# Patient Record
Sex: FEMALE | Race: WHITE | NOT HISPANIC OR LATINO | Employment: OTHER | URBAN - METROPOLITAN AREA
[De-identification: names, ages, dates, MRNs, and addresses within clinical notes are randomized per-mention and may not be internally consistent; named-entity substitution may affect disease eponyms.]

---

## 2017-01-18 ENCOUNTER — APPOINTMENT (OUTPATIENT)
Dept: LAB | Facility: CLINIC | Age: 82
End: 2017-01-18
Payer: MEDICARE

## 2017-01-18 ENCOUNTER — ALLSCRIPTS OFFICE VISIT (OUTPATIENT)
Dept: OTHER | Facility: OTHER | Age: 82
End: 2017-01-18

## 2017-01-18 DIAGNOSIS — E03.9 HYPOTHYROIDISM: ICD-10-CM

## 2017-01-18 LAB — TSH SERPL DL<=0.05 MIU/L-ACNC: 2.13 UIU/ML (ref 0.36–3.74)

## 2017-01-18 PROCEDURE — 36415 COLL VENOUS BLD VENIPUNCTURE: CPT

## 2017-01-18 PROCEDURE — 84443 ASSAY THYROID STIM HORMONE: CPT

## 2017-02-08 ENCOUNTER — GENERIC CONVERSION - ENCOUNTER (OUTPATIENT)
Dept: OTHER | Facility: OTHER | Age: 82
End: 2017-02-08

## 2017-05-16 ENCOUNTER — GENERIC CONVERSION - ENCOUNTER (OUTPATIENT)
Dept: OTHER | Facility: OTHER | Age: 82
End: 2017-05-16

## 2017-08-03 ENCOUNTER — ALLSCRIPTS OFFICE VISIT (OUTPATIENT)
Dept: OTHER | Facility: OTHER | Age: 82
End: 2017-08-03

## 2017-09-27 ENCOUNTER — APPOINTMENT (OUTPATIENT)
Dept: LAB | Facility: CLINIC | Age: 82
End: 2017-09-27
Payer: MEDICARE

## 2017-09-27 ENCOUNTER — ALLSCRIPTS OFFICE VISIT (OUTPATIENT)
Dept: OTHER | Facility: OTHER | Age: 82
End: 2017-09-27

## 2017-09-27 ENCOUNTER — TRANSCRIBE ORDERS (OUTPATIENT)
Dept: LAB | Facility: CLINIC | Age: 82
End: 2017-09-27

## 2017-09-27 DIAGNOSIS — E03.9 HYPOTHYROIDISM: ICD-10-CM

## 2017-09-27 DIAGNOSIS — R42 DIZZINESS AND GIDDINESS: ICD-10-CM

## 2017-09-27 DIAGNOSIS — I10 ESSENTIAL (PRIMARY) HYPERTENSION: ICD-10-CM

## 2017-09-27 DIAGNOSIS — G47.00 INSOMNIA: ICD-10-CM

## 2017-09-27 DIAGNOSIS — R79.9 ABNORMAL FINDING OF BLOOD CHEMISTRY: ICD-10-CM

## 2017-09-27 DIAGNOSIS — E78.5 HYPERLIPIDEMIA: ICD-10-CM

## 2017-09-27 DIAGNOSIS — M25.50 PAIN IN JOINT: ICD-10-CM

## 2017-09-27 DIAGNOSIS — F41.9 ANXIETY DISORDER: ICD-10-CM

## 2017-09-27 DIAGNOSIS — R53.83 OTHER FATIGUE: ICD-10-CM

## 2017-09-27 LAB
ALBUMIN SERPL BCP-MCNC: 3.8 G/DL (ref 3.5–5)
ALP SERPL-CCNC: 71 U/L (ref 46–116)
ALT SERPL W P-5'-P-CCNC: 16 U/L (ref 12–78)
ANION GAP SERPL CALCULATED.3IONS-SCNC: 4 MMOL/L (ref 4–13)
AST SERPL W P-5'-P-CCNC: 18 U/L (ref 5–45)
BILIRUB SERPL-MCNC: 0.81 MG/DL (ref 0.2–1)
BUN SERPL-MCNC: 18 MG/DL (ref 5–25)
CALCIUM SERPL-MCNC: 9.7 MG/DL (ref 8.3–10.1)
CHLORIDE SERPL-SCNC: 106 MMOL/L (ref 100–108)
CHOLEST SERPL-MCNC: 175 MG/DL (ref 50–200)
CO2 SERPL-SCNC: 31 MMOL/L (ref 21–32)
CREAT SERPL-MCNC: 0.76 MG/DL (ref 0.6–1.3)
ERYTHROCYTE [DISTWIDTH] IN BLOOD BY AUTOMATED COUNT: 14 % (ref 11.6–15.1)
GFR SERPL CREATININE-BSD FRML MDRD: 67 ML/MIN/1.73SQ M
GLUCOSE P FAST SERPL-MCNC: 118 MG/DL (ref 65–99)
HCT VFR BLD AUTO: 39.7 % (ref 34.8–46.1)
HDLC SERPL-MCNC: 81 MG/DL (ref 40–60)
HGB BLD-MCNC: 13.1 G/DL (ref 11.5–15.4)
LDLC SERPL CALC-MCNC: 80 MG/DL (ref 0–100)
MCH RBC QN AUTO: 32 PG (ref 26.8–34.3)
MCHC RBC AUTO-ENTMCNC: 33 G/DL (ref 31.4–37.4)
MCV RBC AUTO: 97 FL (ref 82–98)
PLATELET # BLD AUTO: 245 THOUSANDS/UL (ref 149–390)
PMV BLD AUTO: 10.1 FL (ref 8.9–12.7)
POTASSIUM SERPL-SCNC: 4.3 MMOL/L (ref 3.5–5.3)
PROT SERPL-MCNC: 8.2 G/DL (ref 6.4–8.2)
RBC # BLD AUTO: 4.1 MILLION/UL (ref 3.81–5.12)
SODIUM SERPL-SCNC: 141 MMOL/L (ref 136–145)
TRIGL SERPL-MCNC: 69 MG/DL
TSH SERPL DL<=0.05 MIU/L-ACNC: 2.62 UIU/ML (ref 0.36–3.74)
WBC # BLD AUTO: 6.87 THOUSAND/UL (ref 4.31–10.16)

## 2017-09-27 PROCEDURE — 80061 LIPID PANEL: CPT

## 2017-09-27 PROCEDURE — 80053 COMPREHEN METABOLIC PANEL: CPT

## 2017-09-27 PROCEDURE — 84443 ASSAY THYROID STIM HORMONE: CPT

## 2017-09-27 PROCEDURE — 85027 COMPLETE CBC AUTOMATED: CPT

## 2017-09-27 PROCEDURE — 36415 COLL VENOUS BLD VENIPUNCTURE: CPT

## 2017-10-04 ENCOUNTER — GENERIC CONVERSION - ENCOUNTER (OUTPATIENT)
Dept: OTHER | Facility: OTHER | Age: 82
End: 2017-10-04

## 2017-11-14 ENCOUNTER — GENERIC CONVERSION - ENCOUNTER (OUTPATIENT)
Dept: OTHER | Facility: OTHER | Age: 82
End: 2017-11-14

## 2017-11-21 ENCOUNTER — ALLSCRIPTS OFFICE VISIT (OUTPATIENT)
Dept: OTHER | Facility: OTHER | Age: 82
End: 2017-11-21

## 2017-11-21 ENCOUNTER — HOSPITAL ENCOUNTER (INPATIENT)
Facility: HOSPITAL | Age: 82
LOS: 2 days | Discharge: HOME | DRG: 880 | End: 2017-11-24
Attending: EMERGENCY MEDICINE | Admitting: INTERNAL MEDICINE
Payer: MEDICARE

## 2017-11-21 DIAGNOSIS — R41.0 CONFUSION: Primary | ICD-10-CM

## 2017-11-21 DIAGNOSIS — R41.0 DISORIENTATION: ICD-10-CM

## 2017-11-21 DIAGNOSIS — R47.89 WORD FINDING DIFFICULTY: ICD-10-CM

## 2017-11-21 PROCEDURE — 85610 PROTHROMBIN TIME: CPT | Performed by: EMERGENCY MEDICINE

## 2017-11-21 PROCEDURE — 85730 THROMBOPLASTIN TIME PARTIAL: CPT | Performed by: EMERGENCY MEDICINE

## 2017-11-21 PROCEDURE — 36415 COLL VENOUS BLD VENIPUNCTURE: CPT | Performed by: EMERGENCY MEDICINE

## 2017-11-21 PROCEDURE — 81002 URINALYSIS NONAUTO W/O SCOPE: CPT | Performed by: EMERGENCY MEDICINE

## 2017-11-21 PROCEDURE — 80320 DRUG SCREEN QUANTALCOHOLS: CPT | Performed by: EMERGENCY MEDICINE

## 2017-11-21 PROCEDURE — 93005 ELECTROCARDIOGRAM TRACING: CPT | Performed by: EMERGENCY MEDICINE

## 2017-11-21 PROCEDURE — 96361 HYDRATE IV INFUSION ADD-ON: CPT

## 2017-11-21 PROCEDURE — 83735 ASSAY OF MAGNESIUM: CPT | Performed by: EMERGENCY MEDICINE

## 2017-11-21 PROCEDURE — 85025 COMPLETE CBC W/AUTO DIFF WBC: CPT | Performed by: EMERGENCY MEDICINE

## 2017-11-21 PROCEDURE — 80053 COMPREHEN METABOLIC PANEL: CPT | Performed by: EMERGENCY MEDICINE

## 2017-11-21 RX ADMIN — SODIUM CHLORIDE 1000 ML: 0.9 INJECTION, SOLUTION INTRAVENOUS at 23:54

## 2017-11-22 ENCOUNTER — APPOINTMENT (OUTPATIENT)
Dept: NON INVASIVE DIAGNOSTICS | Facility: HOSPITAL | Age: 82
DRG: 880 | End: 2017-11-22
Payer: MEDICARE

## 2017-11-22 ENCOUNTER — APPOINTMENT (OUTPATIENT)
Dept: MRI IMAGING | Facility: HOSPITAL | Age: 82
DRG: 880 | End: 2017-11-22
Payer: MEDICARE

## 2017-11-22 ENCOUNTER — APPOINTMENT (EMERGENCY)
Dept: CT IMAGING | Facility: HOSPITAL | Age: 82
DRG: 880 | End: 2017-11-22
Payer: MEDICARE

## 2017-11-22 PROBLEM — R41.0 CONFUSION: Status: ACTIVE | Noted: 2017-11-22

## 2017-11-22 PROBLEM — I10 ESSENTIAL HYPERTENSION: Chronic | Status: ACTIVE | Noted: 2017-11-22

## 2017-11-22 PROBLEM — R47.89 WORD FINDING DIFFICULTY: Status: ACTIVE | Noted: 2017-11-22

## 2017-11-22 PROBLEM — R41.3 MEMORY LOSS: Chronic | Status: ACTIVE | Noted: 2017-11-22

## 2017-11-22 PROBLEM — E78.5 HYPERLIPIDEMIA: Chronic | Status: ACTIVE | Noted: 2017-11-22

## 2017-11-22 PROBLEM — E03.9 HYPOTHYROID: Chronic | Status: ACTIVE | Noted: 2017-11-22

## 2017-11-22 PROBLEM — R41.82 ALTERED MENTAL STATUS: Status: ACTIVE | Noted: 2017-11-22

## 2017-11-22 LAB
ALBUMIN SERPL BCP-MCNC: 3.8 G/DL (ref 3.5–5)
ALP SERPL-CCNC: 69 U/L (ref 46–116)
ALT SERPL W P-5'-P-CCNC: 16 U/L (ref 12–78)
AMPHETAMINES SERPL QL SCN: NEGATIVE
ANION GAP SERPL CALCULATED.3IONS-SCNC: 11 MMOL/L (ref 4–13)
APTT PPP: 40 SECONDS (ref 23–35)
AST SERPL W P-5'-P-CCNC: 13 U/L (ref 5–45)
ATRIAL RATE: 79 BPM
BACTERIA UR QL AUTO: ABNORMAL /HPF
BARBITURATES UR QL: NEGATIVE
BASOPHILS # BLD AUTO: 0.03 THOUSANDS/ΜL (ref 0–0.1)
BASOPHILS NFR BLD AUTO: 0 % (ref 0–1)
BENZODIAZ UR QL: NEGATIVE
BILIRUB SERPL-MCNC: 0.85 MG/DL (ref 0.2–1)
BILIRUB UR QL STRIP: NEGATIVE
BUN SERPL-MCNC: 22 MG/DL (ref 5–25)
CALCIUM SERPL-MCNC: 9.6 MG/DL (ref 8.3–10.1)
CHLORIDE SERPL-SCNC: 102 MMOL/L (ref 100–108)
CHOLEST SERPL-MCNC: 166 MG/DL (ref 50–200)
CLARITY UR: CLEAR
CO2 SERPL-SCNC: 30 MMOL/L (ref 21–32)
COCAINE UR QL: NEGATIVE
COLOR UR: YELLOW
COLOR, POC: YELLOW
CREAT SERPL-MCNC: 0.83 MG/DL (ref 0.6–1.3)
EOSINOPHIL # BLD AUTO: 0.25 THOUSAND/ΜL (ref 0–0.61)
EOSINOPHIL NFR BLD AUTO: 3 % (ref 0–6)
ERYTHROCYTE [DISTWIDTH] IN BLOOD BY AUTOMATED COUNT: 13.6 % (ref 11.6–15.1)
EST. AVERAGE GLUCOSE BLD GHB EST-MCNC: 111 MG/DL
ETHANOL SERPL-MCNC: <3 MG/DL (ref 0–3)
GFR SERPL CREATININE-BSD FRML MDRD: 61 ML/MIN/1.73SQ M
GLUCOSE SERPL-MCNC: 115 MG/DL (ref 65–140)
GLUCOSE UR STRIP-MCNC: NEGATIVE MG/DL
HBA1C MFR BLD: 5.5 % (ref 4.2–6.3)
HCT VFR BLD AUTO: 39 % (ref 34.8–46.1)
HDLC SERPL-MCNC: 71 MG/DL (ref 40–60)
HGB BLD-MCNC: 12.9 G/DL (ref 11.5–15.4)
HGB UR QL STRIP.AUTO: ABNORMAL
INR PPP: 1.06 (ref 0.86–1.16)
KETONES UR STRIP-MCNC: NEGATIVE MG/DL
LDLC SERPL CALC-MCNC: 84 MG/DL (ref 0–100)
LEUKOCYTE ESTERASE UR QL STRIP: NEGATIVE
LYMPHOCYTES # BLD AUTO: 2.33 THOUSANDS/ΜL (ref 0.6–4.47)
LYMPHOCYTES NFR BLD AUTO: 32 % (ref 14–44)
MAGNESIUM SERPL-MCNC: 1.8 MG/DL (ref 1.6–2.6)
MCH RBC QN AUTO: 32.6 PG (ref 26.8–34.3)
MCHC RBC AUTO-ENTMCNC: 33.1 G/DL (ref 31.4–37.4)
MCV RBC AUTO: 99 FL (ref 82–98)
METHADONE UR QL: NEGATIVE
MONOCYTES # BLD AUTO: 0.53 THOUSAND/ΜL (ref 0.17–1.22)
MONOCYTES NFR BLD AUTO: 7 % (ref 4–12)
MUCOUS THREADS UR QL AUTO: ABNORMAL
NEUTROPHILS # BLD AUTO: 4.23 THOUSANDS/ΜL (ref 1.85–7.62)
NEUTS SEG NFR BLD AUTO: 58 % (ref 43–75)
NITRITE UR QL STRIP: NEGATIVE
NON-SQ EPI CELLS URNS QL MICRO: ABNORMAL /HPF
NRBC BLD AUTO-RTO: 0 /100 WBCS
OPIATES UR QL SCN: NEGATIVE
P AXIS: 83 DEGREES
PCP UR QL: NEGATIVE
PH UR STRIP.AUTO: 6 [PH] (ref 4.5–8)
PLATELET # BLD AUTO: 165 THOUSANDS/UL (ref 149–390)
PLATELET # BLD AUTO: 170 THOUSANDS/UL (ref 149–390)
PMV BLD AUTO: 9.6 FL (ref 8.9–12.7)
PMV BLD AUTO: 9.7 FL (ref 8.9–12.7)
POTASSIUM SERPL-SCNC: 3.6 MMOL/L (ref 3.5–5.3)
PR INTERVAL: 200 MS
PROT SERPL-MCNC: 7.8 G/DL (ref 6.4–8.2)
PROT UR STRIP-MCNC: NEGATIVE MG/DL
PROTHROMBIN TIME: 13.8 SECONDS (ref 12.1–14.4)
QRS AXIS: 22 DEGREES
QRSD INTERVAL: 88 MS
QT INTERVAL: 364 MS
QTC INTERVAL: 417 MS
RBC # BLD AUTO: 3.96 MILLION/UL (ref 3.81–5.12)
RBC #/AREA URNS AUTO: ABNORMAL /HPF
SODIUM SERPL-SCNC: 143 MMOL/L (ref 136–145)
SP GR UR STRIP.AUTO: 1.02 (ref 1–1.03)
SPECIMEN SOURCE: NORMAL
T WAVE AXIS: 73 DEGREES
THC UR QL: NEGATIVE
TRIGL SERPL-MCNC: 53 MG/DL
TROPONIN I BLD-MCNC: 0 NG/ML (ref 0–0.08)
UROBILINOGEN UR QL STRIP.AUTO: 0.2 E.U./DL
VENTRICULAR RATE: 79 BPM
WBC # BLD AUTO: 7.37 THOUSAND/UL (ref 4.31–10.16)
WBC #/AREA URNS AUTO: ABNORMAL /HPF

## 2017-11-22 PROCEDURE — 93306 TTE W/DOPPLER COMPLETE: CPT

## 2017-11-22 PROCEDURE — 84484 ASSAY OF TROPONIN QUANT: CPT

## 2017-11-22 PROCEDURE — 96374 THER/PROPH/DIAG INJ IV PUSH: CPT

## 2017-11-22 PROCEDURE — 85049 AUTOMATED PLATELET COUNT: CPT | Performed by: PHYSICIAN ASSISTANT

## 2017-11-22 PROCEDURE — 81001 URINALYSIS AUTO W/SCOPE: CPT

## 2017-11-22 PROCEDURE — 80307 DRUG TEST PRSMV CHEM ANLYZR: CPT | Performed by: EMERGENCY MEDICINE

## 2017-11-22 PROCEDURE — 87081 CULTURE SCREEN ONLY: CPT | Performed by: PHYSICIAN ASSISTANT

## 2017-11-22 PROCEDURE — 70450 CT HEAD/BRAIN W/O DYE: CPT

## 2017-11-22 PROCEDURE — 80061 LIPID PANEL: CPT | Performed by: PHYSICIAN ASSISTANT

## 2017-11-22 PROCEDURE — 83036 HEMOGLOBIN GLYCOSYLATED A1C: CPT | Performed by: PHYSICIAN ASSISTANT

## 2017-11-22 PROCEDURE — 99285 EMERGENCY DEPT VISIT HI MDM: CPT

## 2017-11-22 RX ORDER — ASPIRIN 81 MG/1
81 TABLET, CHEWABLE ORAL DAILY
Status: DISCONTINUED | OUTPATIENT
Start: 2017-11-22 | End: 2017-11-24 | Stop reason: HOSPADM

## 2017-11-22 RX ORDER — ATORVASTATIN CALCIUM 10 MG/1
10 TABLET, FILM COATED ORAL DAILY
COMMUNITY
End: 2017-11-24 | Stop reason: HOSPADM

## 2017-11-22 RX ORDER — ATORVASTATIN CALCIUM 10 MG/1
10 TABLET, FILM COATED ORAL
Status: DISCONTINUED | OUTPATIENT
Start: 2017-11-22 | End: 2017-11-22

## 2017-11-22 RX ORDER — LEVOTHYROXINE SODIUM 0.07 MG/1
75 TABLET ORAL
Status: DISCONTINUED | OUTPATIENT
Start: 2017-11-22 | End: 2017-11-24 | Stop reason: HOSPADM

## 2017-11-22 RX ORDER — ONDANSETRON 2 MG/ML
4 INJECTION INTRAMUSCULAR; INTRAVENOUS ONCE AS NEEDED
Status: COMPLETED | OUTPATIENT
Start: 2017-11-22 | End: 2017-11-22

## 2017-11-22 RX ORDER — ASPIRIN 81 MG/1
81 TABLET, CHEWABLE ORAL WEEKLY
Status: DISCONTINUED | OUTPATIENT
Start: 2017-11-22 | End: 2017-11-22

## 2017-11-22 RX ORDER — DONEPEZIL HYDROCHLORIDE 5 MG/1
10 TABLET, FILM COATED ORAL DAILY
Status: DISCONTINUED | OUTPATIENT
Start: 2017-11-22 | End: 2017-11-24 | Stop reason: HOSPADM

## 2017-11-22 RX ORDER — HEPARIN SODIUM 5000 [USP'U]/ML
5000 INJECTION, SOLUTION INTRAVENOUS; SUBCUTANEOUS EVERY 8 HOURS SCHEDULED
Status: DISCONTINUED | OUTPATIENT
Start: 2017-11-22 | End: 2017-11-24 | Stop reason: HOSPADM

## 2017-11-22 RX ORDER — ATORVASTATIN CALCIUM 40 MG/1
40 TABLET, FILM COATED ORAL EVERY EVENING
Status: DISCONTINUED | OUTPATIENT
Start: 2017-11-22 | End: 2017-11-24 | Stop reason: HOSPADM

## 2017-11-22 RX ORDER — ESCITALOPRAM OXALATE 5 MG/1
5 TABLET ORAL DAILY
Status: DISCONTINUED | OUTPATIENT
Start: 2017-11-22 | End: 2017-11-24 | Stop reason: HOSPADM

## 2017-11-22 RX ORDER — METOPROLOL TARTRATE 5 MG/5ML
5 INJECTION INTRAVENOUS ONCE
Status: COMPLETED | OUTPATIENT
Start: 2017-11-22 | End: 2017-11-22

## 2017-11-22 RX ORDER — HYDRALAZINE HYDROCHLORIDE 20 MG/ML
5 INJECTION INTRAMUSCULAR; INTRAVENOUS EVERY 6 HOURS PRN
Status: DISCONTINUED | OUTPATIENT
Start: 2017-11-22 | End: 2017-11-24 | Stop reason: HOSPADM

## 2017-11-22 RX ADMIN — ESCITALOPRAM 5 MG: 5 TABLET, FILM COATED ORAL at 17:28

## 2017-11-22 RX ADMIN — METOPROLOL TARTRATE 25 MG: 25 TABLET ORAL at 09:21

## 2017-11-22 RX ADMIN — METOPROLOL TARTRATE 5 MG: 5 INJECTION INTRAVENOUS at 00:41

## 2017-11-22 RX ADMIN — LEVOTHYROXINE SODIUM 75 MCG: 75 TABLET ORAL at 05:25

## 2017-11-22 RX ADMIN — ATORVASTATIN CALCIUM 40 MG: 40 TABLET, FILM COATED ORAL at 17:28

## 2017-11-22 RX ADMIN — ONDANSETRON 4 MG: 2 INJECTION INTRAMUSCULAR; INTRAVENOUS at 21:14

## 2017-11-22 RX ADMIN — DONEPEZIL HYDROCHLORIDE 10 MG: 5 TABLET, FILM COATED ORAL at 17:28

## 2017-11-22 RX ADMIN — ASPIRIN 81 MG 81 MG: 81 TABLET ORAL at 09:21

## 2017-11-22 RX ADMIN — HYDRALAZINE HYDROCHLORIDE 5 MG: 20 INJECTION INTRAMUSCULAR; INTRAVENOUS at 18:11

## 2017-11-22 RX ADMIN — HEPARIN SODIUM 5000 UNITS: 5000 INJECTION, SOLUTION INTRAVENOUS; SUBCUTANEOUS at 05:25

## 2017-11-22 RX ADMIN — METOPROLOL TARTRATE 25 MG: 25 TABLET ORAL at 18:08

## 2017-11-22 NOTE — H&P
History and Physical - [facility]    Patient Information: Bayron Day 80 y o  female MRN: [id number]  Unit/Bed#: E4 -01 Encounter: [id number]  Admitting Physician: Adair Shaikh PA-C  PCP: Marek Son DO  Date of Admission:  11/22/17      Assessment:    Altered mental status    Plan: Altered mental status  · Seen by PCP yesterday with increased confusion  · Today had altered mental status noted by the staff where she resides and was brought to the emergency department  · CT of the head revealed no active bleeding  · Will consult Neurology    Essential hypertension  · Continue metoprolol 25 mg b i d  · Add hydralazine 5 mg IV p r n  Hypothyroidism  · Levoxyl 75 mcg daily    Coronary artery disease  · Aspirin 81 mg daily    Memory loss  · Donepezil 5 mg q h s  HPI:   Isadora Brooks is a 80 y o  female who was seen by her primary care physician yesterday and was demonstrating increasing memory loss and confusion  The staff at her assisted living facility noted that this worsened today  On presentation to the emergency department she had difficulty word finding  She reported that her tongue felt thick or than normal   She was disoriented and confused  She was unable to do serial 7s as well as other cognitive assessments  CT of her head failed to show any acute bleeding or intracranial abnormality  Her urinalysis did not indicate a UTI  Her POC troponin was 0 00    Her CBC did not reveal and anemia  Denies: Chest pain, Shortness of Breath, Nausea, Vomiting, Diarrhea, Dysuria    ROS:  A 12-point review of systems was done  Please see the HPI for the full details  All other systems negative      PMH:  Principal Problem:    Altered mental status  Active Problems:    Word finding difficulty    Confusion    Essential hypertension    Memory loss    Hyperlipidemia    Hypothyroid      Past Medical History:   Diagnosis Date    Altered mental status 11/22/2017    Cardiac disease     Disease of thyroid gland     Essential hypertension 11/22/2017    Hyperlipidemia     Hypertension     Hypothyroid 11/22/2017    Memory loss 11/22/2017    MI (myocardial infarction)     Subdural bleeding (HCC)     from fall     Past Surgical History:   Procedure Laterality Date    BREAST LUMPECTOMY Right     CHOLECYSTECTOMY      HYSTERECTOMY       Social History     Social History    Marital status:      Spouse name: N/A    Number of children: N/A    Years of education: N/A     Social History Main Topics    Smoking status: Never Smoker    Smokeless tobacco: Never Used    Alcohol use No    Drug use: No    Sexual activity: Not Asked     Other Topics Concern    None     Social History Narrative    None     History reviewed  No pertinent family history      MED/ALLERGIES:  Current Facility-Administered Medications   Medication Dose Route Frequency Provider Last Rate Last Dose    aspirin chewable tablet 81 mg  81 mg Oral Daily Braxton Arriaga PA-C        aspirin tablet 81 mg  81 mg Oral Weekly [facility]        atorvastatin (LIPITOR) tablet 10 mg  10 mg Oral Daily Sharif Crawford PA-C        atorvastatin (LIPITOR) tablet 40 mg  40 mg Oral QPM Sharif Crawford PA-C        heparin (porcine) subcutaneous injection 5,000 Units  5,000 Units Subcutaneous [place], [place]        levothyroxine tablet 75 mcg  75 mcg Oral Daily [facility]        metoprolol tartrate (LOPRESSOR) tablet 25 mg  25 mg Oral BID [facility]        ondansetron [facility]) injection 4 mg  4 mg Intravenous Once PRN Delaney Camarillo DO         Allergies   Allergen Reactions    Haldol [Haloperidol]     Sulfa Antibiotics        OBJECTIVE:    Current Vitals:   Blood Pressure: (!) 196/92 (11/22/17 0221)  Pulse: 84 (11/22/17 0221)  Temperature: 98 7 °F (37 1 °C) (11/22/17 0221)  Temp Source: Temporal (11/22/17 0221)  Respirations: 18 (11/22/17 0221)  Height: 5' 3" (160 cm) (11/22/17 0221)  Weight - Scale: 57 3 kg (126 lb 5 2 oz) (11/22/17 0221)  SpO2: 97 % (11/22/17 0221)      Intake/Output Summary (Last 24 hours) at 11/22/17 0409  Last data filed at 11/22/17 0300   Gross per 24 hour   Intake             1000 ml   Output              800 ml   Net              200 ml       Invasive Devices     Peripheral Intravenous Line            Peripheral IV 11/21/17 Right Antecubital less than 1 day                  Physical Exam   Constitutional: She appears well-developed and well-nourished  No distress  Eyes: EOM are normal  Pupils are equal, round, and reactive to light  No scleral icterus  Neck: No thyromegaly present  Cardiovascular: Normal rate, regular rhythm and normal heart sounds  No murmur heard  Pulmonary/Chest: Effort normal and breath sounds normal  No respiratory distress  She has no wheezes  Abdominal: Soft  Bowel sounds are normal  She exhibits no distension  There is no tenderness  There is no rebound  Neurological: She is alert  She is disoriented  No cranial nerve deficit  Psychiatric: She has a normal mood and affect  Her speech is normal and behavior is normal  Thought content is not paranoid  Cognition and memory are impaired  She does not express impulsivity  She expresses no homicidal and no suicidal ideation  She exhibits abnormal recent memory                                                       Lab Results:   Results from last 7 days  Lab Units 11/21/17  2355   WBC Thousand/uL 7 37   HEMOGLOBIN g/dL 12 9   HEMATOCRIT % 39 0   PLATELETS Thousands/uL 165      Results from last 7 days  Lab Units 11/21/17  2355   SODIUM mmol/L 143   POTASSIUM mmol/L 3 6   CHLORIDE mmol/L 102   CO2 mmol/L 30   BUN mg/dL 22   CREATININE mg/dL 0 83   CALCIUM mg/dL 9 6   TOTAL PROTEIN g/dL 7 8   BILIRUBIN TOTAL mg/dL 0 85   ALK PHOS U/L 69   ALT U/L 16   AST U/L 13   GLUCOSE RANDOM mg/dL 115     Lab Results   Component Value Date    CKTOTAL 53 09/09/2015    TROPONINI 0 41 (H) 06/01/2015 Imaging:  CT head:  No ischemic event; age-related decrease in white matter  EKG, Pathology, and Other Studies:  EKG:  No ischemic findings    VTE Prophylaxis: Heparin    Code Status: FULL    Counseling / Coordination of Care: Total floor / unit time spent today 20 minutes  Anticipated Length of Stay will be: MORE THAN 2 (TWO) Midnights due to: work up for altered mental status  Andrés Hernández PA-C    This note has been constructed using a voice recognition system

## 2017-11-22 NOTE — CONSULTS
Consulted for stroke  Pt reported good appetite, no concerns w/ chewing or swallowing, no concerns w/ wt changes  Eats well at [facility], avoids, salt use and sweets/desserts  Pt reported usually eating oatmeal, flaxseed, banana for breakfast, salad bar and soup for L and meat/fish, veggies for D, stated "I always pass on the desserts and sweets"  Pt not appropriate for any diet ed  Recommend liberalizing diet to Regular

## 2017-11-22 NOTE — PLAN OF CARE
Activity Intolerance/Impaired Mobility     Mobility/activity is maintained at optimum level for patient Progressing        Communication Impairment     Ability to express needs and understand communication Progressing        INFECTION - ADULT     Absence or prevention of progression during hospitalization Progressing     Absence of fever/infection during neutropenic period Progressing        Knowledge Deficit     Patient/family/caregiver demonstrates understanding of disease process, treatment plan, medications, and discharge instructions Progressing        Neurological Deficit     Neurological status is stable or improving Progressing        NEUROSENSORY - ADULT     Achieves stable or improved neurological status Progressing        PAIN - ADULT     Verbalizes/displays adequate comfort level or baseline comfort level Progressing        Potential for Aspiration     Non-ventilated patient's risk of aspiration is minimized Progressing        Potential for Falls     Patient will remain free of falls Progressing        Prexisting or High Potential for Compromised Skin Integrity     Skin integrity is maintained or improved Progressing        SAFETY ADULT     Maintain or return to baseline ADL function Progressing     Maintain or return mobility status to optimal level Progressing

## 2017-11-22 NOTE — ED PROVIDER NOTES
History  Chief Complaint   Patient presents with    Medical Problem     pt woke up from nap and felt "off", she stated she felt like she was confused and that her tongue was swollen, called her LPN who came over and found her daily medications scattered all over the counter      79 yo female who lives alone at [facility] in [place] [address]  Pt took a nap this evening and when she woke up she felt strange - clock said 7:21 but pt felt the numbers were not correct  Having a hard time telling me why she felt this - was able to see the numbers and focus on them but says they just were not right  She continued having similar issues and called nursing staff around 2300 who noted some scattered pills on the counter  Pt denies taking any extra medications, only those she was scheduled for  She is neuro intact here although has trouble counting backwards from 100 by 7's and spelling world correctly or backwards  History provided by:  Patient and EMS personnel   used: No    Altered Mental Status   Presenting symptoms: confusion    Severity:  Mild  Most recent episode: Today  Episode history:  Single  Duration:  5 hours  Timing:  Constant  Progression:  Unchanged  Chronicity:  New  Associated symptoms: no abdominal pain, no fever, no hallucinations, no headaches, no light-headedness, no nausea, no palpitations, no rash, no suicidal behavior, no vomiting and no weakness    Associated symptoms comment:  Felt her tongue feels thick      Prior to Admission Medications   Prescriptions Last Dose Informant Patient Reported? Taking?   aspirin 81 MG tablet   Yes Yes   Sig: Take 81 mg by mouth once a week  3 times a week   atorvastatin (LIPITOR) 10 mg tablet   Yes Yes   Sig: Take 10 mg by mouth daily   levothyroxine 75 mcg tablet   Yes Yes   Sig: Take 75 mcg by mouth daily  metoprolol tartrate (LOPRESSOR) 25 mg tablet   Yes Yes   Sig: Take 25 mg by mouth 2 (two) times a day  Facility-Administered Medications: None       Past Medical History:   Diagnosis Date    Cardiac disease     Disease of thyroid gland     Hyperlipidemia     Hypertension     MI (myocardial infarction)     Subdural bleeding ([address] )     from fall       Past Surgical History:   Procedure Laterality Date    BREAST LUMPECTOMY Right     CHOLECYSTECTOMY      HYSTERECTOMY         History reviewed  No pertinent family history  I have reviewed and agree with the history as documented  Social History   Substance Use Topics    Smoking status: Never Smoker    Smokeless tobacco: Not on file    Alcohol use No        Review of Systems   Constitutional: Negative for appetite change, chills, fatigue and fever  HENT: Negative for congestion and sore throat  Eyes: Negative for visual disturbance  Respiratory: Negative for shortness of breath  Cardiovascular: Negative for chest pain and palpitations  Gastrointestinal: Negative for abdominal pain, diarrhea, nausea and vomiting  Genitourinary: Negative for dysuria, frequency, vaginal bleeding and vaginal discharge  Musculoskeletal: Negative for back pain, neck pain and neck stiffness  Skin: Negative for pallor and rash  Allergic/Immunologic: Negative for immunocompromised state  Neurological: Negative for weakness, light-headedness and headaches  Psychiatric/Behavioral: Positive for confusion  Negative for hallucinations  All other systems reviewed and are negative        Physical Exam  ED Triage Vitals   Temperature Pulse Respirations Blood Pressure SpO2   11/22/17 0141 11/21/17 2349 11/21/17 2349 11/21/17 2349 11/21/17 2349   97 7 °F (36 5 °C) 82 18 (!) 215/92 98 %      Temp Source Heart Rate Source Patient Position - Orthostatic VS BP Location FiO2 (%)   11/22/17 0141 11/21/17 2349 11/22/17 0036 11/21/17 2349 --   Oral Monitor Lying Right arm       Pain Score       11/21/17 2349       No Pain           Orthostatic Vital Signs  Vitals:    11/22/17 0036 11/22/17 0052 11/22/17 0100 11/22/17 0204   BP: (!) 191/80 (!) 191/80 (!) 184/79 151/70   Pulse: 74 66 62    Patient Position - Orthostatic VS: Lying          Physical Exam   Constitutional: She is oriented to person, place, and time  She appears well-developed and well-nourished  No distress  HENT:   Head: Normocephalic and atraumatic  Right Ear: External ear normal    Left Ear: External ear normal    Mouth/Throat: Oropharynx is clear and moist    Eyes: EOM are normal  Pupils are equal, round, and reactive to light  Neck: Normal range of motion  Neck supple  Cardiovascular: Normal rate and regular rhythm  No murmur heard  Pulmonary/Chest: Effort normal and breath sounds normal  No respiratory distress  Abdominal: Soft  Bowel sounds are normal    Musculoskeletal: Normal range of motion  Neurological: She is alert and oriented to person, place, and time  She displays normal reflexes  No cranial nerve deficit or sensory deficit  She exhibits normal muscle tone  Coordination normal    Difficulty counting backwards from 100 by 7's, trouble spelling world forward and backwards, some word finding issues during H&P - unable to come up with the word denture when describing why she is refusing to smile for me while testing her cranial nerves   Skin: Skin is warm  Capillary refill takes less than 2 seconds  No rash noted  No pallor  Psychiatric: She has a normal mood and affect  Her behavior is normal    Nursing note and vitals reviewed        ED Medications  Medications   sodium chloride 0 9 % bolus 1,000 mL (0 mL Intravenous Stopped 11/22/17 0124)   metoprolol (LOPRESSOR) injection 5 mg (5 mg Intravenous Given 11/22/17 0041)       Diagnostic Studies  Results Reviewed     Procedure Component Value Units Date/Time    Rapid drug screen, urine [[id number]]  (Normal) Collected:  11/22/17 0134    Lab Status:  Final result Specimen:  Urine from Urine, Clean Catch Updated:  11/22/17 0156     Amph/Meth UR Negative     Barbiturate Ur Negative     Benzodiazepine Urine Negative     Cocaine Urine Negative     Methadone Urine Negative     Opiate Urine Negative     PCP Ur Negative     THC Urine Negative    Narrative:         FOR MEDICAL PURPOSES ONLY  IF CONFIRMATION NEEDED PLEASE CONTACT THE LAB WITHIN 5 DAYS      Drug Screen Cutoff Levels:  AMPHETAMINE/METHAMPHETAMINES  1000 ng/mL  BARBITURATES     200 ng/mL  BENZODIAZEPINES     200 ng/mL  COCAINE      300 ng/mL  METHADONE      300 ng/mL  OPIATES      300 ng/mL  PHENCYCLIDINE     25 ng/mL  THC       50 ng/mL    Urine Microscopic [[id number]]  (Abnormal) Collected:  11/22/17 0148    Lab Status:  Final result Specimen:  Urine from Urine, Clean Catch Updated:  11/22/17 0147     RBC, UA 0-1 (A) /hpf      WBC, UA 0-1 (A) /hpf      Epithelial Cells Occasional /hpf      Bacteria, UA Occasional /hpf      MUCOUS THREADS Occasional    POCT urinalysis dipstick [[id number]]  (Normal) Resulted:  11/22/17 0135    Lab Status:  Final result Specimen:  Urine Updated:  11/22/17 0135     Color, UA yellow    ED Urine Macroscopic [[id number]]  (Abnormal) Collected:  11/22/17 0148    Lab Status:  Final result Specimen:  Urine Updated:  11/22/17 0132     Color, UA Yellow     Clarity, UA Clear     pH, UA 6 0     Leukocytes, UA Negative     Nitrite, UA Negative     Protein, UA Negative mg/dl      Glucose, UA Negative mg/dl      Ketones, UA Negative mg/dl      Urobilinogen, UA 0 2 E U /dl      Bilirubin, UA Negative     Blood, UA Trace (A)     Specific [place], UA 1 020    Narrative:       CLINITEK RESULT    Comprehensive metabolic panel [[id number]] Collected:  11/21/17 9074    Lab Status:  Final result Specimen:  Blood from Arm, Right Updated:  11/22/17 0058     Sodium 143 mmol/L      Potassium 3 6 mmol/L      Chloride 102 mmol/L      CO2 30 mmol/L      Anion Gap 11 mmol/L      BUN 22 mg/dL      Creatinine 0 83 mg/dL      Glucose 115 mg/dL      Calcium 9 6 mg/dL      AST 13 U/L      ALT 16 U/L Alkaline Phosphatase 69 U/L      Total Protein 7 8 g/dL      Albumin 3 8 g/dL      Total Bilirubin 0 85 mg/dL      eGFR 61 ml/min/1 73sq m     Narrative:         National Kidney Disease Education Program recommendations are as follows:  GFR calculation is accurate only with a steady state creatinine  Chronic Kidney disease less than 60 ml/min/1 73 sq  meters  Kidney failure less than 15 ml/min/1 73 sq  meters  Magnesium [[id number]]  (Normal) Collected:  11/21/17 2355    Lab Status:  Final result Specimen:  Blood from Arm, Right Updated:  11/22/17 0058     Magnesium 1 8 mg/dL     Protime-INR [[id number]]  (Normal) Collected:  11/21/17 2355    Lab Status:  Final result Specimen:  Blood from Arm, Right Updated:  11/22/17 0044     Protime 13 8 seconds      INR 1 06    APTT [[id number]]  (Abnormal) Collected:  11/21/17 2355    Lab Status:  Final result Specimen:  Blood from Arm, Right Updated:  11/22/17 0044     PTT 40 (H) seconds     Narrative: Therapeutic Heparin Range = 60-90 seconds    Ethanol [[id number]]  (Normal) Collected:  11/21/17 2355    Lab Status:  Final result Specimen:  Blood from Arm, Right Updated:  11/22/17 0033     Ethanol Lvl <3 mg/dL     POCT troponin [[id number]]  (Normal) Collected:  11/22/17 0000    Lab Status:  Final result Updated:  11/22/17 0013     POC Troponin I 0 00 ng/ml      Specimen Type VENOUS    Narrative:         [employer] i-Stat handheld analyzer 99% cutoff is > 0 08ng/mL in [facility] Emergency Departments    o cTnI 99% cutoff is useful only when applied to patients in the clinical setting of myocardial ischemia  o cTnI 99% cutoff should be interpreted in the context of clinical history, ECG findings and possibly cardiac imaging to establish correct diagnosis  o cTnI 99% cutoff may be suggestive but clearly not indicative of a coronary event without the clinical setting of myocardial ischemia      CBC and differential [[id number]]  (Abnormal) Collected:  11/21/17 2355    Lab Status:  Final result Specimen:  Blood from Arm, Right Updated:  11/22/17 0002     WBC 7 37 Thousand/uL      RBC 3 96 Million/uL      Hemoglobin 12 9 g/dL      Hematocrit 39 0 %      MCV 99 (H) fL      MCH 32 6 pg      MCHC 33 1 g/dL      RDW 13 6 %      MPV 9 7 fL      Platelets 824 Thousands/uL      nRBC 0 /100 WBCs      Neutrophils Relative 58 %      Lymphocytes Relative 32 %      Monocytes Relative 7 %      Eosinophils Relative 3 %      Basophils Relative 0 %      Neutrophils Absolute 4 23 Thousands/µL      Lymphocytes Absolute 2 33 Thousands/µL      Monocytes Absolute 0 53 Thousand/µL      Eosinophils Absolute 0 25 Thousand/µL      Basophils Absolute 0 03 Thousands/µL                  CT head without contrast    (Results Pending)              Procedures  ECG 12 Lead Documentation  Date/Time: 11/22/2017 12:05 AM  Performed by: Radha Benz by: Saeid Herrera     Indications / Diagnosis:  Confusion  Previous ECG:     Previous ECG:  Compared to current    Comparison ECG info:  5/3/14    Similarity:  No change  Interpretation:     Interpretation: normal    Rate:     ECG rate:  79    ECG rate assessment: normal    Rhythm:     Rhythm: sinus rhythm    Ectopy:     Ectopy: none    QRS:     QRS axis:  Normal    QRS intervals:  Normal  ST segments:     ST segments:  Normal  T waves:     T waves: normal             Phone Contacts  ED Phone Contact    ED Course  ED Course as of Nov 22 0212   Tue Nov 21, 2017   2341 Pt seen and examined  81 yo female who lives alone at [facility] in [facility]  Pt took a nap this evening and when she woke up she felt strange - clock said 7:21 but pt felt the numbers were not correct  Having a hard time telling me why she felt this - was able to see the numbers and focus on them but says they just were not right  She continued having similar issues and called nursing staff around 2300 who noted some scattered pills on the counter    Pt denies taking any extra medications, only those she was scheduled for  She is neuro intact here although has trouble counting backwards from 100 by 7's and spelling world correctly or backwards  Will check labs, urine, EKG and CT head  Will give IVF  BS enroute WNL  Wed Nov 22, 2017   0029 Initial /92, will repeat and if still elevated give 5mg lopressor as she takes metoprolol at home and HR in 80's  0036 191/80, HR 80 - pt to be given lopressor 5mg  0036 CT head - 1  No intracranial hemorrhage  2  Age related cerebral volume loss and findings likely related to chronic white matter ischemic  disease  0102 Labs all WNL, pt attempting to give urine sample  9837 Urine dip neg for infection  /82  Pt seems slightly more confused than before  I mention her being admitted for further eval and she says "that means moving and I don't want to move because it will effect my vertigo" yet pt not vertiginous at this time  MDM  CritCare Time    Disposition  Final diagnoses:   Confusion   Word finding difficulty     Time reflects when diagnosis was documented in both MDM as applicable and the Disposition within this note     Time User Action Codes Description Comment    11/22/2017  1:49 AM Keira HERNÁNDEZ Add [R41 0] Confusion     11/22/2017  1:50 AM Lila Cloud Add [R47 89] Word finding difficulty       ED Disposition     ED Disposition Condition Comment    Admit  Case was discussed with Stefani MOODY and the patient's admission status was agreed to be Admission Status: observation status to the service of Dr Pierre Loyd   Follow-up Information    None       Patient's Medications   Discharge Prescriptions    No medications on file     No discharge procedures on file      ED Provider  Electronically Signed by           Nilsa Cruz DO  11/22/17 9217

## 2017-11-22 NOTE — PROGRESS NOTES
Assessment    1  Benign essential hypertension (401 1) (I10)   2  Memory loss (780 93) (R41 3)    Discussion/Summary    1  Blood pressure stable today  2  Cognitive issues/early memory loss  Continue present medication  Patient does seem a little bit more forgetful today than previously noted  Will follow this closely  Recheck in April with full labs  Declines going back to Cardiology  The was counseled regarding instructions for management,-- patient and family education  Chief Complaint  F/U HTN and HL; discuss asa  ksd,cma      History of Present Illness  The patient presents for follow-up of essential hypertension  The patient states she has been doing well with her blood pressure control since the last visit  Symptoms: The patient is currently asymptomatic  60-year-old white female here for blood pressure check and review of medications before the winter starts  Overall she states she is doing well  Lives at [address] [facility]   Constitutional: No fever, no chills, feels well, no tiredness, no recent weight gain or weight loss  Cardiovascular: No complaints of slow heart rate, no fast heart rate, no chest pain, no palpitations, no leg claudication, no lower extremity edema  Respiratory: No complaints of shortness of breath, no wheezing, no cough, no SOB on exertion, no orthopnea, no PND  Active Problems  1  Actinic keratosis (702 0) (L57 0)   2  Ambulatory dysfunction (719 7) (R26 2)   3  Anxiety (300 00) (F41 9)   4  Arthralgia (719 40) (M25 50)   5  Benign essential hypertension (401 1) (I10)   6  Constipation (564 00) (K59 00)   7  Coronary artery disease (414 00) (I25 10)   8  De Quervain's tenosynovitis (727 04) (M65 4)   9  Dry skin (701 1) (L85 3)   10  Hand pain, unspecified laterality   11  Hyperlipidemia (272 4) (E78 5)   12  Hypothyroidism (244 9) (E03 9)   13  Insomnia (780 52) (G47 00)   14  Medicare annual wellness visit, subsequent (V70 0) (Z00 00)   15  Memory loss (780 93) (R41 3)   16  Need for immunization against influenza (V04 81) (Z23)   17  Need for influenza vaccination (V04 81) (Z23)   18  Need for pneumococcal vaccination (V03 82) (Z23)   19  Need for Tdap vaccination (V06 1) (Z23)   20  Screening for colon cancer (V76 51) (Z12 11)   21  Screening for depression (V79 0) (Z13 89)   22  Screening for other and unspecified genitourinary condition (V81 6) (Z13 89)   23  Special screening for other neurological conditions (V80 09) (Z13 89)   24  Subdural hygroma (432 1) (D18 1)   25  Synovitis And Tenosynovitis Of Hand/Wrist (727 05)   26  Transient ischemic attack (435 9) (G45 9)   27  Unstable angina (411 1) (I20 0)   28  Vitamin D deficiency (268 9) (E55 9)   29  Xerostomia (527 7) (R68 2)    Past Medical History    1  History of Abnormal blood chemistry (790 6) (R79 9)   2  History of Abnormal CT scan, neck (793 99) (R93 8)   3  History of Acute myocardial infarction (410 90) (I21 9)   4  History of Acute URI (465 9) (J06 9)   5  History of Anxiety (300 00) (F41 9)   6  History of Benign essential hypertension (401 1) (I10)   7  History of Benign paroxysmal vertigo, unspecified laterality (386 11) (H81 10)   8  History of Depression (311) (F32 9)   9  History of Depression (311) (F32 9)   10  History of Diverticulosis (562 10) (K57 90)   11  History of basal cell carcinoma (V10 83) (Z85 828)   12  History of dizziness (V13 89) (Z87 898)   13  History of fatigue (V13 89) (Z87 898)   14  History of shortness of breath (V13 89) (Z87 898)   15  History of shortness of breath (V13 89) (Z87 898)   16  History of subdural hematoma (V12 59) (Z86 79)   17  History of transient cerebral ischemia (V12 54) (Z86 73)   18  History of vertigo (V12 49) (Z87 898)   19  History of Limb pain (729 5) (M00 352)   20  History of Long term current use of antithrombotics/antiplatelets (M86 13) (Z42 70)   21   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)   22  History of Need for influenza vaccination (V04 81) (Z23)   23  History of Special screening for other neurological conditions (V80 09) (Z13 89)    Surgical History  1  History of Colonoscopy (Fiberoptic)   2  History of Total Abdominal Hysterectomy With Removal Of Both Ovaries    Family History  Mother    1  Family history of Diabetes Mellitus (V18 0)  Father    2  Family history of Colon Cancer (V16 0)  Maternal Grandmother    3  Family history of   Paternal Grandmother    3  Family history of   Maternal Grandfather    5  Family history of   Paternal Grandfather    10  Family history of     Social History     · Being A Social Drinker   · Living In [facility]   · Marital History -    · Never A Smoker    Current Meds   1  Ammonium Lactate 12 % External Cream; APPLY  AND RUB  IN A THIN FILM TO AFFECTED AREAS TWICE DAILY  (AM AND PM); Therapy: 83DTP5177 to (Last Rx:2017)  Requested for: 12LNQ7441 Ordered   2  Aspirin 81 MG TABS; take one tab 3 times/week  MW; Therapy: (Recorded:2015) to Recorded   3  Atorvastatin Calcium 10 MG Oral Tablet; TAKE 1 TABLET DAILY; Therapy: 17YYH2405 to (Evaluate:19Lyw9744)  Requested for: 08Kpm6319; Last Rx:57Knz6510 Ordered   4  Donepezil HCl - 5 MG Oral Tablet; TAKE ONE TABLET BY MOUTH AT BEDTIME; Therapy: 52APR5353 to (Evaluate:20Xtf7179)  Requested for: 83ZZE7124; Last Rx:85Upn3367 Ordered   5  Levoxyl 75 MCG Oral Tablet; TAKE 1 TABLET DAILY; Therapy: 39Btv5420 to (Evaluate:14Cms7940)  Requested for: 93Wjw4673; Last Rx:03Rtl6822 Ordered   6  Metoprolol Tartrate 25 MG Oral Tablet; take 1 tablet by mouth twice a day; Therapy: 91MBW0550 to (Evaluate:94Mbe9734)  Requested for: 03Ucy5245; Last Rx:88Njn2412 Ordered    Allergies  1  CeleBREX CAPS   2  ACE Inhibitors   3  Haldol SOLN   4   Sulfa Drugs    Vitals  Vital Signs    Recorded: 10UVY1673 95:67AP   Systolic 278   Diastolic 70   Height 5 ft 1 2 in   Weight 127 lb    BMI Calculated 23 84   BSA Calculated 1 56       Physical Exam   Constitutional  General appearance: No acute distress, well appearing and well nourished  Pulmonary  Auscultation of lungs: Clear to auscultation  Cardiovascular  Auscultation of heart: Normal rate and rhythm, normal S1 and S2, without murmurs     Examination of extremities for edema and/or varicosities: Normal    Carotid pulses: Normal    Psychiatric  Mood and affect: Normal          Future Appointments    Date/Time Provider Specialty Site   99/35/4663 98:46 PM Landon Donahue DO Family Medicine [facility]       Signatures   Electronically signed by : Darnell Mansfield DO; Nov 21 5744  9:56AM EST                       (Author)

## 2017-11-22 NOTE — CASE MANAGEMENT
Initial Clinical Review    Admission: Date/Time/Statement:  OBS 11/22 @ 0150    Orders Placed This Encounter   Procedures    Place in Observation (expected length of stay for this patient is less than two midnights)     Standing Status:   Standing     Number of Occurrences:   1     Order Specific Question:   Admitting Physician     Answer:   DEONNA VILLATLA [[id number]]     Order Specific Question:   Level of Care     Answer:   Med Surg [16]       ED: Date/Time/Mode of Arrival:   ED Arrival Information     Expected Arrival Acuity Means of Arrival Escorted By Service Admission Type    - 11/21/2017 23:40 Urgent Ambulance [facility] Urgent    Arrival Complaint    od          Chief Complaint:   Chief Complaint   Patient presents with    Medical Problem     pt woke up from nap and felt "off", she stated she felt like she was confused and that her tongue was swollen, called her LPN who came over and found her daily medications scattered all over the counter        History of Illness: 81 yo female who [facility] in [facility]  Pt took a nap this evening and when she woke up she felt strange - clock said 7:21 but pt felt the numbers were not correct  Having a hard time telling me why she felt this - was able to see the numbers and focus on them but says they just were not right  She continued having similar issues and called nursing staff around 2300 who noted some scattered pills on the counter  Pt denies taking any extra medications, only those she was scheduled for    She is neuro intact here although has trouble counting backwards from 100 by 7's and spelling world correctly or backwards    ED Vital Signs:   ED Triage Vitals   Temperature Pulse Respirations Blood Pressure SpO2   11/22/17 0141 11/21/17 2349 11/21/17 2349 11/21/17 2349 11/21/17 2349   97 7 °F (36 5 °C) 82 18 (!) 215/92 98 %      Temp Source Heart Rate Source Patient Position - Orthostatic VS BP Location FiO2 (%)   11/22/17 0141 11/21/17 2349 11/22/17 0036 11/21/17 2349 --   Oral Monitor Lying Right arm       Pain Score       11/21/17 2349       No Pain        Wt Readings from Last 1 Encounters:   11/22/17 57 3 kg (126 lb 5 2 oz)       Vital Signs (abnormal):   11/22/17 0100 -- 62 18  184/79 92 % -- SHARONDA   11/22/17 0052 -- 66 18  191/80 97 % -- SHARONDA   11/22/17 0036 -- 74 18  191/80 97 % -- DIC   11/21/17 2349 -- 82 18         Abnormal Labs/Diagnostic Test Results:  PTT  40  Urine: trace blood,   occ bacteria  CT Head: No acute intracranial abnormality  EKG: Normal sinus rhythm  Normal ECG    ED Treatment:   Medication Administration from 11/21/2017 2340 to 11/22/2017 0215       Date/Time Order Dose Route Action Action by Comments     11/22/2017 0124 sodium chloride 0 9 % bolus 1,000 mL 0 mL Intravenous Stopped Fletcher Gama RN      11/21/2017 2354 sodium chloride 0 9 % bolus 1,000 mL 1,000 mL Intravenous New [facility] Fletcher Gama, [address] [place] [address]      11/22/2017 0041 metoprolol (LOPRESSOR) injection 5 mg 5 mg Intravenous Given Fletcher Gama RN           Past Medical/Surgical History: Active Ambulatory Problems     Diagnosis Date Noted    No Active Ambulatory Problems     Resolved Ambulatory Problems     Diagnosis Date Noted    No Resolved Ambulatory Problems     Past Medical History:   Diagnosis Date    Altered mental status 11/22/2017    Cardiac disease     Disease of thyroid gland     Essential hypertension 11/22/2017    Hyperlipidemia     Hypertension     Hypothyroid 11/22/2017    Memory loss 11/22/2017    MI (myocardial infarction)     Subdural bleeding (HCC)        Admitting Diagnosis: Confusion [R41 0]  Overdose [T50 901A]  Word finding difficulty [R47 89]    Age/Sex: 80 y o  female     80 y o  female who was seen by her primary care physician yesterday and was demonstrating increasing memory loss and confusion  The staff at her assisted living facility noted that this worsened today  On presentation to the emergency department she had difficulty word finding  She reported that her tongue felt thick or than normal   She was disoriented and confused  She was unable to do serial 7s as well as other cognitive assessments  CT of her head failed to show any acute bleeding or intracranial abnormality  Assessment/Plan:   Altered mental status     Plan:     Altered mental status  · Seen by PCP yesterday with increased confusion  · Today had altered mental status noted by the staff where she resides and was brought to the emergency department  · CT of the head revealed no active bleeding  · Will consult Neurology     Essential hypertension  · Continue metoprolol 25 mg b i d  · Add hydralazine 5 mg IV p r n      Hypothyroidism  · Levoxyl 75 mcg daily     Coronary artery disease  · Aspirin 81 mg daily     Memory loss  · Donepezil 5 mg q h s  Anticipated Length of Stay will be: MORE THAN 2 (TWO) Midnights due to: work up for altered mental status      Admission Orders:  OBS  TELE  Neuro checks q1h x 4, q2h x 4, q4h  Consult Neuro  PT / OT / Speech Eval  Dysphagia assessment  MRI  ECHO    Scheduled Meds:   aspirin 81 mg Oral Weekly   aspirin 81 mg Oral Daily   atorvastatin 10 mg Oral Daily With Dinner   atorvastatin 40 mg Oral QPM   heparin (porcine) 5,000 Units Subcutaneous Q8H [address]   levothyroxine 75 mcg Oral Early Morning   metoprolol tartrate 25 mg Oral BID     Continuous Infusions:    PRN Meds: hydrALAZINE    ondansetron    T Max  99 6 temporal  /78      St  [address] [facility] in the [facility] by Levy Gonzalez for 2017  Network Utilization Review Department  Phone: [phone]; Fax [phone]  ATTENTION: The Network Utilization Review Department is now centralized for our 7 Facilities  Make a note that we have a new phone and fax numbers for our Department   Please call with any questions or concerns to [phone] and carefully follow the prompts so that you are directed to the right person  All voicemails are confidential  Fax any determinations, approvals, denials, and requests for initial or continue stay review clinical to [phone]  Due to HIGH CALL volume, it would be easier if you could please send faxed requests to expedite your requests and in part, help us provide discharge notifications faster

## 2017-11-22 NOTE — OCCUPATIONAL THERAPY NOTE
Occupational Therapy Cancellation Note        Patient Name: Jenifer Robbins  QBVPB'C Date: 11/22/2017    Attempted to see pt x2, pt was being evaluated by neurology and pt off floor at MRI  Will continue to follow to address OT evaluation      Bernie Wood MS, OTR/L

## 2017-11-22 NOTE — CONSULTS
Consultation - Neurology   Robert Marley 80 y o  female MRN: [id number]  Unit/Bed#: E4 -01 Encounter: [id number]      Assessment/Plan   Assessment:  1  Episode of impaired visual processing, resolved  Differential diagnosis includes:  1  Acute occipital stroke or TIA;  2  Hypertensive encephalopathy/PRES affecting the occipital lobes (left less likely);  3   Severe anxiety/panic resulting in worsened deducted reasoning, judgment, global cognitive function, and specific deterioration of visual processing, an area which she exhibited deficits on prior testing  (Most likely)  2  Reported increased insomnia for several days due to increased frequency of anxiety episodes  3  Dementia, may be frontal lobe or fronto temporal  4  Anxiety, depression  5  History of subdural hematoma/hygroma due to a mechanical fall while on daily aspirin  Plan:  1  Continue aspirin 81 mg daily, rather than 3 times weekly  API can be checked in 5-10 days  2   Patient refused MRI  3  Continue Lipitor at 40 mg daily  4  Prior testing of reversible dementia labs was unremarkable   5  Aricept can be increased to 10 mg daily  6  Consider use of an antidepressant with anxiolytic properties  7  Recommend she transfer to a higher level of care within her facility, likely assisted level  She requires medications supervision, reassurance by having staff available  8  Additional recommendations per attending neurologist  9    Discontinue stroke pathway  History of Present Illness     Physician Requesting Consult: Ramy Ann MD  Reason for Consult / Principal Problem: confusion, disorientation  Hx and PE limited by:  Her reliability as a historian is unclear    HPI: Robert Marley is a 80y o  year old right-handed female with a history of dementia who was admitted on the stroke pathway to [facility] in [facility] after she presented to the emergency department on 11/212017 at 2340 hours with confusion and disorientation  Neurology has been asked to evaluate  This patient has a history of cognitive decline dating back to 2006  She was followed in the [facility] neurology office until about 2012, and had been prescribed Aricept as well as Namenda  It is unclear when these medications were discontinued  While she was under our care, she underwent neuropsychological testing (Estefania Paris PhD at [facility]), where, in addition to impaired memory, she showed deficits in visual scanning and attention, capacity to recall visual stimuli, encoding visual stimuli, and visual attention to details  In September 2017, her PCP restarted Aricept 5 mg daily, with plans to increase to 10 mg  She was seen in the PCP office on 11/21/2017 in the morning, at which time she was noted to be more forgetful compared to previous visits  She voiced no complaints, and overall her physical status was stable  Blood pressure was 120/70  She reports to this examiner that she has had worsening episodes of anxiety for at least 1 month  She admits to excessive worry, especially about appointments, schedule, her losing things  She notes that she escalates into a panic stage or crisis, and cannot sleep  She reports having poor sleep for at least 3 days prior to this admission  To other examiners, she reported she took a nap in the late day 11/21, however she has no recall of the nap for me  Which she has consistently reported however is that she looked at the clock, and was able to make out each individual number as well as the a m /p m  dot, but she could not interpret what the numbers meant  She recalls thinking that she urgently needed to sleep, but her feelings were of anxiety, confusion  After several hours, she summoned the facility nurse at [facility], and had no difficulty recalling the office phone number from memory, or identifying/using numbers on her phone  An ambulance was summoned for transport to the ED  Yg Lord Pills were noted to be scattered on the kitchen counter, and the patient recalls that those were aspirin tablets that she had pushed around  She denied taking extra medications, but admits that she has difficulty remembering to take her medications on schedule (uses a medication box)  She arrived in the ED, where she was afebrile, heart rate 82, but blood pressure 215/92  Blood pressure only came down to 191/80 with time, then she received Lopressor 5 mg IV  Blood pressure remained elevated until after 0200  She reports she mentally improved while in the ED, but is unsure what time  She had 1 episode of panic in the ED:  While she was being transferred to a [facility], she anticipated she would have vertigo, which was quite distressing to her in the past   She has otherwise remained stable since arrival     She denies headache, neck pain, acute visual changes, lateralizing weakness, focal sensory loss  She endorses shortness of breath and palpitations when anxious  Episodes of anxiety are increasing in frequency  She denies chest pain, abdominal complaints  Her aspirin dose has been increased from 81 mg 3 times a week to 81 mg daily, Lipitor dose was increased from 10 mg to 40 mg daily  She is awaiting MRI brain  She offers that she is having increasing difficulty with medication compliance, at times missing doses  She queries if this is the time that she should increase the amount of supervision and care she received the [facility], by moving to the assisted living section  Inpatient consult to Neurology  Consult performed by: Meghan Santana ordered by: Sp Morris          Review of Systems   Constitutional: Positive for fatigue  HENT: Positive for dental problem and hearing loss  Negative for ear pain and trouble swallowing  Eyes: Positive for visual disturbance  Respiratory: Positive for shortness of breath  Cardiovascular: Positive for palpitations  Negative for chest pain  Musculoskeletal: Positive for arthralgias  Neurological: Negative for dizziness, tremors, seizures, syncope, speech difficulty, weakness, light-headedness, numbness and headaches  Psychiatric/Behavioral: Positive for confusion, decreased concentration, dysphoric mood and sleep disturbance  The patient is nervous/anxious  All other systems reviewed and are negative  Historical Information   Past Medical History:   Diagnosis Date    Altered mental status 11/22/2017    Anxiety     Basal cell adenocarcinoma     Coronary artery disease     Dementia 2006    Depression     Diverticulosis     Essential hypertension 11/22/2017    Hyperlipidemia     Hypertension     Hypothyroid 11/22/2017    Hypothyroid     Insomnia     MI (myocardial infarction)     Subdural hematoma, post-traumatic ([facility] [address] ) 2014    left sided, treated nonoperatively    Subdural hygroma 2014    s/p subdural hematoma    TIA (transient ischemic attack)     Unstable angina (HCC) 2015    Vertigo      Past Surgical History:   Procedure Laterality Date    BREAST LUMPECTOMY Right     CATARACT EXTRACTION      CHOLECYSTECTOMY      HYSTERECTOMY       Social History   History   Alcohol Use No     History   Drug Use No     History   Smoking Status    Never Smoker   Smokeless Tobacco    Never Used     Family History:   Family History   Problem Relation Age of Onset    Diabetes Mother     Lung cancer Mother     Colon cancer Father     Dementia Father     Prostate cancer Brother        Review of previous medical records was  completed       Meds/Allergies   current meds:   Current Facility-Administered Medications   Medication Dose Route Frequency    aspirin chewable tablet 81 mg  81 mg Oral Weekly    aspirin chewable tablet 81 mg  81 mg Oral Daily    atorvastatin (LIPITOR) tablet 10 mg  10 mg Oral Daily With Dinner    atorvastatin (LIPITOR) tablet 40 mg  40 mg Oral QPM    heparin (porcine) subcutaneous injection 5,000 Units 5,000 Units Subcutaneous Q8H [address]    hydrALAZINE (APRESOLINE) injection 5 mg  5 mg Intravenous Q6H PRN    levothyroxine tablet 75 mcg  75 mcg Oral Early Morning    metoprolol tartrate (LOPRESSOR) tablet 25 mg  25 mg Oral BID    ondansetron (ZOFRAN) injection 4 mg  4 mg Intravenous Once PRN    and PTA meds:   Prior to Admission Medications   Prescriptions Last Dose Informant Patient Reported? Taking?   aspirin 81 MG tablet   Yes Yes   Sig: Take 81 mg by mouth once a week  3 times a week   atorvastatin (LIPITOR) 10 mg tablet   Yes Yes   Sig: Take 10 mg by mouth daily   levothyroxine 75 mcg tablet   Yes Yes   Sig: Take 75 mcg by mouth daily  metoprolol tartrate (LOPRESSOR) 25 mg tablet   Yes Yes   Sig: Take 25 mg by mouth 2 (two) times a day  Facility-Administered Medications: None   Aricept 5mg daily (restarted 09/2017)    Allergies   Allergen Reactions    Haldol [Haloperidol]     Sulfa Antibiotics        Objective   Vitals:Blood pressure 141/78, pulse 72, temperature 98 5 °F (36 9 °C), temperature source Temporal, resp  rate 18, height 5' 3" (1 6 m), weight 57 3 kg (126 lb 5 2 oz), SpO2 96 %  ,Body mass index is 22 38 kg/m²  Intake/Output Summary (Last 24 hours) at 11/22/17 1008  Last data filed at 11/22/17 0643   Gross per 24 hour   Intake             1000 ml   Output             1000 ml   Net                0 ml       Physical Exam   Constitutional: She appears well-developed and well-nourished  No distress  HENT:   Head: Normocephalic and atraumatic  Mouth/Throat: Oropharynx is clear and moist    Eyes: Conjunctivae and EOM are normal  Pupils are equal, round, and reactive to light  No scleral icterus  Postop IOL   Neck: Neck supple  Carotid bruit is not present  No neck rigidity  Decreased range of motion: All planes  Cardiovascular: Normal rate, regular rhythm and intact distal pulses  No murmur heard  Pulmonary/Chest: Effort normal and breath sounds normal    Abdominal: Soft   Bowel sounds are normal    Musculoskeletal: Normal range of motion  She exhibits no edema or deformity  Sitting posture:  Listing to the left, head lean to left   Neurological: She has normal strength  She has a normal Finger-Nose-Finger Test and a normal Heel to [employer]    Reflex Scores:       Tricep reflexes are 1+ on the right side and 1+ on the left side  Bicep reflexes are 1+ on the right side and 1+ on the left side  Brachioradialis reflexes are 1+ on the right side and 1+ on the left side  Patellar reflexes are 1+ on the right side and 1+ on the left side  Achilles reflexes are 2+ on the right side and 2+ on the left side  Skin: Skin is dry  She is not diaphoretic  Hands and feet cool to touch   Psychiatric: Her speech is normal    Mild anxiety   Vitals reviewed  Neurologic Exam     Mental Status   Oriented to person  Oriented to place  Disoriented to date and day  Oriented to year and month  Follows 2 step (Unable to complete 3 step motor commands) commands  Attention: decreased (Errors with recitation of reverse order months, but able to problem solve)  Concentration: decreased  Speech: speech is normal   Level of consciousness: alert  Knowledge: good  Able to perform simple calculations (Errors when performing verbal calculations:  Intact written calculations with inattention to plus or minus signs)  Unable to name object (Intermittent naming errors of common objects)  Able to read (No errors)  Able to repeat  Normal comprehension  Able deduce the correct date when given additional information  Suspect she has confabulatory at times  An example is the educational and occupational history given to this examiner is entirely different from that supplied to the neuro psychologist      Cranial Nerves     CN II   Visual fields full to confrontation     Visual acuity: normal with correction  Right visual field deficit: none  Left visual field deficit: none     CN III, IV, VI   Pupils are equal, round, and reactive to light  Extraocular motions are normal    Right pupil: Size: 2 mm  Shape: irregular  Reactivity: sluggish  Left pupil: Size: 2 mm  Shape: irregular  Reactivity: sluggish  Nystagmus: none   Conjugate gaze: present    CN V   Facial sensation intact  Right facial sensation deficit: none  Left facial sensation deficit: none    CN VII   Facial expression full, symmetric  Left facial weakness: Subtle flattening of nasolabial fold at rest only, may be normal variant  CN VIII   Hearing: intact (To finger rub)    CN IX, X   CN IX normal    CN X normal    Palate: symmetric    CN XI   CN XI normal    Right sternocleidomastoid strength: normal  Left sternocleidomastoid strength: normal  Right trapezius strength: normal  Left trapezius strength: normal    CN XII   CN XII normal    Tongue deviation: none  Unable to visualize fundi     Motor Exam   Muscle bulk: normal (For age)  Overall muscle tone: normal  Right arm pronator drift: absent  Left arm pronator drift: absent    Strength   Strength 5/5 throughout       Sensory Exam   Light touch normal    Right arm vibration: normal  Left arm vibration: normal  Right leg vibration: decreased from knee  Left leg vibration: decreased from ankle  Right arm pinprick: normal  Left arm pinprick: normal  Right leg pinprick: decreased from ankle  Left leg pinprick: decreased from ankle  Temperature decreased in hands and feet     Gait, Coordination, and Reflexes     Gait  Gait: (Deferred)    Coordination   Finger to nose coordination: normal  Heel to shin coordination: normal    Tremor   Resting tremor: absent  Intention tremor: absent  Action tremor: absent    Reflexes   Right brachioradialis: 1+  Left brachioradialis: 1+  Right biceps: 1+  Left biceps: 1+  Right triceps: 1+  Left triceps: 1+  Right patellar: 1+  Left patellar: 1+  Right achilles: 2+  Left achilles: 2+  Right plantar: normal  Left plantar: normal  Right Thakkar: absent  Left Thakkar: absent  Right ankle clonus: absent  Left ankle clonus: absentFine motor repetitions symmetric/brisk:  Finger taps, toe taps           Lab Results:   CBC:   Results from last 7 days  Lab Units 11/22/17  0530 11/21/17  2355   WBC Thousand/uL  --  7 37   RBC Million/uL  --  3 96   HEMOGLOBIN g/dL  --  12 9   HEMATOCRIT %  --  39 0   MCV fL  --  99*   PLATELETS Thousands/uL 170 165   , BMP/CMP:   Results from last 7 days  Lab Units 11/21/17  2355   SODIUM mmol/L 143   POTASSIUM mmol/L 3 6   CHLORIDE mmol/L 102   CO2 mmol/L 30   ANION GAP mmol/L 11   BUN mg/dL 22   CREATININE mg/dL 0 83   GLUCOSE RANDOM mg/dL 115   CALCIUM mg/dL 9 6   AST U/L 13   ALT U/L 16   ALK PHOS U/L 69   TOTAL PROTEIN g/dL 7 8   ALBUMIN g/dL 3 8   BILIRUBIN TOTAL mg/dL 0 85   EGFR ml/min/1 73sq m 61   , Coagulation:   Results from last 7 days  Lab Units 11/21/17  2355   INR  1 06   , Urinalysis:   Results from last 7 days  Lab Units 11/22/17  0148 11/22/17  0135   COLOR UA  Yellow yellow   CLARITY UA  Clear  --    SPEC GRAV UA  1 020  --    PH UA  6 0  --    LEUKOCYTES UA  Negative  --    NITRITE UA  Negative  --    PROTEIN UA mg/dl Negative  --    GLUCOSE UA mg/dl Negative  --    KETONES UA mg/dl Negative  --    BILIRUBIN UA  Negative  --    BLOOD UA  Trace*  --    , Drug Screen:   Results from last 7 days  Lab Units 11/22/17  0134   BARBITURATE UR  Negative   BENZODIAZEPINE UR  Negative   THC UR  Negative   COCAINE UR  Negative   METHADONE URINE  Negative   OPIATE UR  Negative   PCP UR  Negative    No results found for: HGBA1C  Results from last 7 days  Lab Units 11/22/17  0530   TRIGLYCERIDES mg/dL 53   CHOLESTEROL mg/dL 166   LDL CALC mg/dL 84   HDL mg/dL 71*   BAT neg  Trop 0 00    09/2017 TSH 2 620  09/2016 B12 778  01/2015 CRP 2 5, ESR 41, DANIEL + titer 160, RF neg, SS-A/SS-B neg    Imaging Studies: I have personally reviewed pertinent reports     and I have personally reviewed pertinent films in PACS   CT head: Decreased attenuation is noted in the supratentorial white matter demonstrating an appearance most consistent with moderate microangiopathic change  No intracranial mass, mass effect or midline shift  No CT signs of acute infarction  There is no parenchymal hemorrhage  Enlargement of ventricles and extra-axial CSF spaces consistent with cerebral and cerebellar atrophy  MRI brain: pending    10/2014 CT head: Periventricular low attenuation likely represents chronic small vessel ischemic disease  No intracranial mass, mass effect or midline shift  No acute intracranial hemorrhage  No CT signs of acute infarction  Vascular calcifications  Prominence of the cortical sulci and ventricles is consistent with atrophy  Left hemispheric chronic subdural hematoma/hygroma appears more hypodense and less perceptible, measuring roughly 2-3 mm, previously measuring 4 mm    05/2014 CT head: Decreased attenuation is noted in the supratentorial white matter demonstrating an appearance most consistent with moderate microangiopathic change  There is a left frontal acute sub-dural hematoma measuring 16 mm in maximal thickness  There appears to be slight rightward midline shift measuring 2 mm  Trace amounts of subdural blood also seen along the left temporal and parietal lobes  These measure 4 mm and 3 mm respectively  No CT signs of acute infarction  EKG, Pathology, and Other Studies: I have personally reviewed pertinent reports  EKG: Normal sinus rhythm Normal ECG When compared with ECG of 03-MAY-2014 18:26, Premature atrial complexes are no longer Present  Atrial/ventric rate 79    Echocardiogram: PND    TRANSTHORACIC ECHOCARDIOGRAM  01-Jun-2015   SUMMARY   LEFT VENTRICLE:  Systolic function was normal  Ejection fraction was estimated in the range of   55 % to 65 %  There were no regional wall motion abnormalities  Wall thickness was mildly increased  There was mild concentric hypertrophy     MITRAL VALVE: There was trace regurgitation  AORTIC VALVE: There was trace regurgitation  TRICUSPID VALVE: There was trace regurgitation  PULMONIC VALVE: There was trace regurgitation  AORTA: Luminal irregularities of the upper abdominal aorta        VTE Prophylaxis: Heparin

## 2017-11-22 NOTE — PLAN OF CARE
INFECTION - ADULT     Absence or prevention of progression during hospitalization Progressing     Absence of fever/infection during neutropenic period Progressing        PAIN - ADULT     Verbalizes/displays adequate comfort level or baseline comfort level Progressing        Potential for Falls     Patient will remain free of falls Progressing        SAFETY ADULT     Maintain or return to baseline ADL function Progressing     Maintain or return mobility status to optimal level Progressing

## 2017-11-22 NOTE — SPEECH THERAPY NOTE
Screen only  "i talk too much, and I love the salad bar at [facility]"  No acute speech or swallow needs at this time  "i would like my short term memory back"  D/c ST order

## 2017-11-22 NOTE — PHYSICAL THERAPY NOTE
Physical Therapy Cancellation Note- pt currently being evaluated by neurology, will follow for ability to complete eval   Rusty Goodson PT

## 2017-11-22 NOTE — PLAN OF CARE
INFECTION - ADULT     Absence or prevention of progression during hospitalization Progressing     Absence of fever/infection during neutropenic period Progressing        NEUROSENSORY - ADULT     Achieves stable or improved neurological status Progressing        PAIN - ADULT     Verbalizes/displays adequate comfort level or baseline comfort level Progressing        Potential for Falls     Patient will remain free of falls Progressing        Prexisting or High Potential for Compromised Skin Integrity     Skin integrity is maintained or improved Progressing        SAFETY ADULT     Maintain or return to baseline ADL function Progressing     Maintain or return mobility status to optimal level Progressing

## 2017-11-23 PROBLEM — F41.1 GENERALIZED ANXIETY DISORDER: Status: ACTIVE | Noted: 2017-11-23

## 2017-11-23 LAB — MRSA NOSE QL CULT: NORMAL

## 2017-11-23 PROCEDURE — 97166 OT EVAL MOD COMPLEX 45 MIN: CPT

## 2017-11-23 PROCEDURE — G8988 SELF CARE GOAL STATUS: HCPCS

## 2017-11-23 PROCEDURE — G8979 MOBILITY GOAL STATUS: HCPCS

## 2017-11-23 PROCEDURE — G8989 SELF CARE D/C STATUS: HCPCS

## 2017-11-23 PROCEDURE — G8987 SELF CARE CURRENT STATUS: HCPCS

## 2017-11-23 PROCEDURE — 97163 PT EVAL HIGH COMPLEX 45 MIN: CPT

## 2017-11-23 PROCEDURE — G8978 MOBILITY CURRENT STATUS: HCPCS

## 2017-11-23 RX ADMIN — DONEPEZIL HYDROCHLORIDE 10 MG: 5 TABLET, FILM COATED ORAL at 08:30

## 2017-11-23 RX ADMIN — ASPIRIN 81 MG 81 MG: 81 TABLET ORAL at 08:30

## 2017-11-23 RX ADMIN — METOPROLOL TARTRATE 25 MG: 25 TABLET ORAL at 08:30

## 2017-11-23 RX ADMIN — ESCITALOPRAM 5 MG: 5 TABLET, FILM COATED ORAL at 08:30

## 2017-11-23 RX ADMIN — METOPROLOL TARTRATE 25 MG: 25 TABLET ORAL at 17:49

## 2017-11-23 RX ADMIN — HEPARIN SODIUM 5000 UNITS: 5000 INJECTION, SOLUTION INTRAVENOUS; SUBCUTANEOUS at 05:35

## 2017-11-23 RX ADMIN — LEVOTHYROXINE SODIUM 75 MCG: 75 TABLET ORAL at 08:29

## 2017-11-23 RX ADMIN — ATORVASTATIN CALCIUM 40 MG: 40 TABLET, FILM COATED ORAL at 17:49

## 2017-11-23 NOTE — PLAN OF CARE
Problem: PHYSICAL THERAPY ADULT  Goal: Performs mobility at highest level of function for planned discharge setting  See evaluation for individualized goals  Treatment/Interventions: Functional transfer training, LE strengthening/ROM, Therapeutic exercise, Endurance training, Patient/family training, Bed mobility, Gait training, Spoke to nursing, OT  Equipment Recommended: Filiberto George       See flowsheet documentation for full assessment, interventions and recommendations  Outcome: Progressing  Prognosis: Good  Problem List: Decreased cognition, Impaired judgement, Decreased safety awareness, Impaired balance  Assessment: Pt  89 y  o female admitted for altered mental status in the setting of dementia  (-) stroke per CT  Pt referred to PT for mobility assessment & D/C planning  PTA, pt resident of [address] [place] [address]; pt reports being I w/ RW but admits to noncompliance w/ use of RW at times  On eval, pt appears to be close to her normal baseline function  Pt require S for most functional mobility + cues for techniques  Gait deviations as above but no gross LOB noted  Will continue skilled PT to improve function & safety  At this time, pt will benefit from a more supervised environment such as [facility]  Per RN, pt is in the process of transitioning to [facility] PTA but not finalized yet  If pt unable to transfer to [facility] yet, pt may D/C to her apartment w/ 24hr(S) for safety  CM to follow  Pt remain OOB in chair at end of session w/o issues  Call bell in reach  Chair alarm in placed & activated  Nsg staff to continue to mobilized pt (ambulate in unit & OOB in chair for all meals) as tolerated to prevent further decline in function  Nsg notified     Barriers to Discharge: Decreased caregiver support  Barriers to Discharge Comments: pt home alone  Recommendation: Other (Comment) (pt will benefit from a more supervised environment)     PT - OK to Discharge: Yes (when medically cleared)    See flowsheet documentation for full assessment

## 2017-11-23 NOTE — PHYSICIAN ADVISOR
Current patient class: Observation  The patient is currently on Hospital Day: 2    This patient was originally admitted to the hospital under observation status  After admission the patient was reevaluated and determined to require further hospitalization  The patient is now documented to require at least a 2nd midnight in the hospital  As such the patient is now expected to satisfy the 2 midnight benchmark and is therefore eligible for inpatient admission  After review of the relevant documentation, labs, vital signs and test results, the patient is appropriate for INPATIENT ADMISSION  Admission to the hospital as an inpatient is a complex decision making process which requires the practitioner to consider the patients presenting complaint, history and physical examination and all relevant testing  With this in mind, in this case, the patient was deemed appropriate for INPATIENT ADMISSION  After review of the documentation and testing available at the time of the admission I concur with this clinical determination of medical necessity  Rationale is as follows: The patient is a 80 yrs Female who presented to the ED at 11/21/2017 11:40 PM with a chief complaint of Medical Problem (pt woke up from nap and felt "off", she stated she felt like she was confused and that her tongue was swollen, called her LPN who came over and found her daily medications scattered all over the counter )     Patient will continue to remain hospitalized further, with evaluation by Neurology for possible occipital stroke versus TIA, hypertensive encephalopathy, and severe anxiety  Patient will satisfy the 2 midnight benchmark, and will continue to remain hospitalized further  The patient will satisfy the benchmark tonight, and is therefore appropriate for inpatient admission    Given the patient's advanced age, presence of neurological symptoms, multiple possible etiologies and requiring further evaluation the patient is at significantly increased risk of adverse outcome upon discharge  As such the patient is appropriate for inpatient admission      The patients vitals on arrival were ED Triage Vitals   Temperature Pulse Respirations Blood Pressure SpO2   11/22/17 0141 11/21/17 2349 11/21/17 2349 11/21/17 2349 11/21/17 2349   97 7 °F (36 5 °C) 82 18 (!) 215/92 98 %      Temp Source Heart Rate Source Patient Position - Orthostatic VS BP Location FiO2 (%)   11/22/17 0141 11/21/17 2349 11/22/17 0036 11/21/17 2349 --   Oral Monitor Lying Right arm       Pain Score       11/21/17 2349       No Pain           Past Medical History:   Diagnosis Date    Altered mental status 11/22/2017    Anxiety     Basal cell adenocarcinoma     Coronary artery disease     Dementia 2006    Depression     Diverticulosis     Essential hypertension 11/22/2017    Hyperlipidemia     Hypertension     Hypothyroid 11/22/2017    Hypothyroid     Insomnia     MI (myocardial infarction)     Subdural hematoma, post-traumatic ([facility] [address] ) 2014    left sided, treated nonoperatively    Subdural hygroma 2014    s/p subdural hematoma    TIA (transient ischemic attack)     Unstable angina ([facility] [address] ) 2015    Vertigo      Past Surgical History:   Procedure Laterality Date    BREAST LUMPECTOMY Right     CATARACT EXTRACTION      CHOLECYSTECTOMY      HYSTERECTOMY         The patient was admitted to the hospital at N/A on N/A for the following diagnosis:  Confusion [R41 0]  Overdose [T50 901A]  Word finding difficulty [R47 89]    Consults have been placed to:   IP CONSULT TO NEUROLOGY  IP CONSULT TO NUTRITION SERVICES    Vitals:    11/22/17 1808 11/22/17 1811 11/22/17 1900 11/22/17 2113   BP: (!) 173/79 (!) 173/79 146/66 150/65   Pulse: 79  72 55   Resp:   20 20   Temp:   (!) 96 7 °F (35 9 °C) (!) 96 8 °F (36 °C)   TempSrc:   Tympanic Tympanic   SpO2:   96% 96%   Weight:       Height:           Most recent labs:    Recent Labs      11/21/17   2355  11/22/17   0530   WBC 7 37   --    HGB  12 9   --    HCT  39 0   --    PLT  165  170   K  3 6   --    NA  143   --    CALCIUM  9 6   --    BUN  22   --    CREATININE  0 83   --    INR  1 06   --    AST  13   --    ALT  16   --    ALKPHOS  69   --    BILITOT  0 85   --        Scheduled Meds:  aspirin 81 mg Oral Daily   atorvastatin 40 mg Oral QPM   donepezil 10 mg Oral Daily   escitalopram 5 mg Oral Daily   heparin (porcine) 5,000 Units Subcutaneous Q8H [address]   levothyroxine 75 mcg Oral Early Morning   metoprolol tartrate 25 mg Oral BID     Continuous Infusions:   PRN Meds: hydrALAZINE    Surgical procedures (if appropriate):

## 2017-11-23 NOTE — OCCUPATIONAL THERAPY NOTE
OccupationalTherapy Evaluation(time=5576-8419)     Patient Name: Ronald Castaneda  ZINWP'I Date: 11/23/2017  Problem List  Patient Active Problem List   Diagnosis    Altered mental status    Word finding difficulty    Confusion    Essential hypertension    Memory loss    Hyperlipidemia    Hypothyroid    Generalized anxiety disorder     Past Medical History  Past Medical History:   Diagnosis Date    Altered mental status 11/22/2017    Anxiety     Basal cell adenocarcinoma     Coronary artery disease     Dementia 2006    Depression     Diverticulosis     Essential hypertension 11/22/2017    Hyperlipidemia     Hypertension     Hypothyroid 11/22/2017    Hypothyroid     Insomnia     MI (myocardial infarction)     Subdural hematoma, post-traumatic ([facility] [address] ) 2014    left sided, treated nonoperatively    Subdural hygroma 2014    s/p subdural hematoma    TIA (transient ischemic attack)     Unstable angina ([facility] [address] ) 2015    Vertigo      Past Surgical History  Past Surgical History:   Procedure Laterality Date    BREAST LUMPECTOMY Right     CATARACT EXTRACTION      CHOLECYSTECTOMY      HYSTERECTOMY             11/23/17 1121   Note Type   Note type Eval only   Restrictions/Precautions   Weight Bearing Precautions Per Order No   Other Precautions Cognitive; Chair Alarm; Fall Risk   Pain Assessment   Pain Assessment No/denies pain   Home Living   Type of Home Apartment  ([facility])   Home Layout One level   Home Equipment Walker;Cane   Prior Function   Lives With Alone   Lifestyle   Autonomy PTA pt states independence with all aspects of her ADLs, transfers, ambulation--with use of RW vs  SPC; neg falls, neg    Reciprocal Relationships 3 children   Service to Others worked as an    Intrinsic Gratification reading   Psychosocial   Psychosocial (WDL) X   Patient Behaviors/Mood Anxious; Cooperative   Subjective   Subjective "I wish I could get a book on exercises for my mind " ADL   Where Assessed Chair   Eating Assistance 6  Modified independent   Grooming Assistance 5  Supervision/Setup   UB Bathing Assistance 5  Supervision/Setup   LB Bathing Assistance 5  Supervision/Setup   UB Dressing Assistance 5  Supervision/Setup   LB Dressing Assistance 5  Supervision/Setup   Transfers   Sit to Stand 5  Supervision   Additional items Impulsive;Verbal cues   Stand to Sit 5  Supervision   Additional items Verbal cues; Impulsive   Functional Mobility   Functional Mobility 5  Supervision   Additional items Rolling walker   Balance   Static Sitting Normal   Dynamic Sitting Good   Static Standing Fair +   Dynamic Standing Fair   Activity Tolerance   Activity Tolerance Patient tolerated treatment well   Medical Staff Made Aware nsg-Roseanne   RUE Assessment   RUE Assessment WFL   RUE Strength   RUE Overall Strength Within Functional Limits - able to perform ADL tasks with strength   LUE Assessment   LUE Assessment WFL   LUE Strength   LUE Overall Strength Within Functional Limits - able to perform ADL tasks with strength   Hand Function   Gross Motor Coordination Functional   Fine Motor Coordination Functional   Sensation   Light Touch No apparent deficits   Proprioception   Proprioception No apparent deficits   Vision-Basic Assessment   Current Vision Wears glasses all the time   Vision - Complex Assessment   Acuity Able to read clock/calendar on wall without difficulty   Perception   Inattention/Neglect Appears intact   Cognition   Overall Cognitive Status Impaired   Arousal/Participation Alert   Attention Attends with cues to redirect   Orientation Level Oriented to person;Oriented to place;Oriented to time   Memory Decreased short term memory;Decreased recall of precautions   Following Commands Follows one step commands without difficulty   Comments pt with hx dementia   Assessment   Prognosis Fair   Assessment Pt is a 81y/o female admitted to the hospital from her PCP 2* increased confusion, altered mental status  PTA pt states independence with all aspects of her ADLs, transfers, ambulation--with use of RW vs  SPC; neg falls, neg   During initial eval, pt demonstrated slight deficits with her functional balance and significant deficits with her cognition(i e memory, problem-solving, judgement/safety)  Pt appears close to her baseline level of function  Currently, pt would best benefit from a supervised environment 2* cognitive/home-safety deficits  Acute OT tx not indicated at this time 2* pt's limited deviation from her functional baseline      Goals   Patient Goals "to get my memory better "   Plan   OT Frequency Eval only   Recommendation   OT Discharge Recommendation 24 hour supervision/assist   Barthel Index   Feeding 10   Bathing 0   Grooming Score 5   Dressing Score 5   Bladder Score 5   Bowels Score 10   Toilet Use Score 5   Transfers (Bed/Chair) Score 10   Mobility (Level Surface) Score 10   Stairs Score 0   Barthel Index Score 60   Santos Gilliland, OT

## 2017-11-23 NOTE — PROGRESS NOTES
[facility] Internal Medicine Progress Note  [facility] 80 [facility]   MRN: [id number]  PCP: Lary Frey DO  Unit/Bed#: E4 -01 Encounter: [id number]  Date Of Visit: 17    Assessment:    Principal Problem:    Altered mental status  Active Problems:    Word finding difficulty    Confusion    Essential hypertension    Memory loss    Hyperlipidemia    Hypothyroid    Generalized anxiety disorder      Plan:    · Altered mental status and after evaluation seems to be consistent with aggravation however existing anxiety disorder this has manifested by diminished memory confusion at times and issues at her independent living facility with her medications will now warrant a higher level of care with assisted living at [facility]  Added Lexapro 5 mg daily will continue Aricept for known mild dementia  · Hyperlipidemia continue atorvastatin aspirin  · Levothyroxine will be continued for hypothyroidism  · Essential hypertension continue metoprolol 25 mg b i d  at stable      VTE Pharmacologic Prophylaxis:   Pharmacologic: Heparin  Mechanical VTE Prophylaxis in Place: Yes    Discussions with Specialists or Other Care Team Provider:  Case management, Neurology, however to her knee Whitley    Time Spent for Care: 20 minutes  More than 50% of total time spent on counseling and coordination of care as described above  Subjective:   Pleasant is alert short-term memory issues remain in remains anxious seems to be accepting of need for high level of care 1 second be arranged  Spoke to her daughter face yesterday in this regard along with case management and Neurology    Objective:     Vitals:   Temp (24hrs), Av °F (36 7 °C), Min:96 7 °F (35 9 °C), Max:98 8 °F (37 1 °C)    HR:  [55-79] 68  Resp:  [18-20] 18  BP: (145-178)/(62-88) 148/69  SpO2:  [95 %-97 %] 96 %  Body mass index is 22 38 kg/m²  Input and Output Summary (last 24 hours):        Intake/Output Summary (Last 24 hours) at 17 1108  Last data filed at 11/22/17 1801   Gross per 24 hour   Intake              240 ml   Output                0 ml   Net              240 ml       Physical Exam:     Physical Exam:   General appearance: alert, appears stated age and cooperative  Head: Normocephalic, without obvious abnormality, atraumatic  Lungs: clear to auscultation bilaterally  Heart: regular rate and rhythm  Abdomen: soft, non-tender; bowel sounds normal; no masses,  no organomegaly  Back: negative  Extremities: extremities normal, atraumatic, no cyanosis or edema  Neurologic: Grossly normal      Additional Data:     Labs:      Results from last 7 days  Lab Units 11/22/17  0530 11/21/17  2355   WBC Thousand/uL  --  7 37   HEMOGLOBIN g/dL  --  12 9   HEMATOCRIT %  --  39 0   PLATELETS Thousands/uL 170 165   NEUTROS PCT %  --  58   LYMPHS PCT %  --  32   MONOS PCT %  --  7   EOS PCT %  --  3       Results from last 7 days  Lab Units 11/21/17  2355   SODIUM mmol/L 143   POTASSIUM mmol/L 3 6   CHLORIDE mmol/L 102   CO2 mmol/L 30   BUN mg/dL 22   CREATININE mg/dL 0 83   CALCIUM mg/dL 9 6   TOTAL PROTEIN g/dL 7 8   BILIRUBIN TOTAL mg/dL 0 85   ALK PHOS U/L 69   ALT U/L 16   AST U/L 13   GLUCOSE RANDOM mg/dL 115       Results from last 7 days  Lab Units 11/21/17  2355   INR  1 06       * I Have Reviewed All Lab Data Listed Above  * Additional Pertinent Lab Tests Reviewed: [address] Admission Reviewed    Imaging:  Ct Head Without Contrast    Result Date: 11/22/2017  Narrative: CT BRAIN - WITHOUT CONTRAST INDICATION:  Confusion  COMPARISON:  10/13/2014  TECHNIQUE:  CT examination of the brain was performed  In addition to axial images, coronal reformatted images were created and submitted for interpretation  Radiation dose length product (DLP) for this visit:  1069 mGy-cm     This examination, like all CT scans performed in the [facility], was performed utilizing techniques to minimize radiation dose exposure, including the use of iterative reconstruction and automated exposure control  IMAGE QUALITY:  Diagnostic  FINDINGS:  PARENCHYMA:  Decreased attenuation is noted in the supratentorial white matter demonstrating an appearance most consistent with moderate microangiopathic change  No intracranial mass, mass effect or midline shift  No CT signs of acute infarction  There is no parenchymal hemorrhage  VENTRICLES AND EXTRA-AXIAL SPACES:  Enlargement of ventricles and extra-axial CSF spaces consistent with cerebral and cerebellar atrophy  VISUALIZED ORBITS AND PARANASAL SINUSES:  Unremarkable  CALVARIUM AND EXTRACRANIAL SOFT TISSUES:   Normal      Impression: No acute intracranial abnormality  Workstation performed: [id number]     Imaging Reports Reviewed Today Include:  Review CT of brain  Imaging Personally Reviewed by Myself Includes:  no  Procedure: Ct Head Without Contrast    Result Date: 11/22/2017  Narrative: CT BRAIN - WITHOUT CONTRAST INDICATION:  Confusion  COMPARISON:  10/13/2014  TECHNIQUE:  CT examination of the brain was performed  In addition to axial images, coronal reformatted images were created and submitted for interpretation  Radiation dose length product (DLP) for this visit:  1069 mGy-cm   This examination, like all CT scans performed in the [facility], was performed utilizing techniques to minimize radiation dose exposure, including the use of iterative reconstruction and automated exposure control  IMAGE QUALITY:  Diagnostic  FINDINGS:  PARENCHYMA:  Decreased attenuation is noted in the supratentorial white matter demonstrating an appearance most consistent with moderate microangiopathic change  No intracranial mass, mass effect or midline shift  No CT signs of acute infarction  There is no parenchymal hemorrhage  VENTRICLES AND EXTRA-AXIAL SPACES:  Enlargement of ventricles and extra-axial CSF spaces consistent with cerebral and cerebellar atrophy   VISUALIZED ORBITS AND PARANASAL SINUSES:  Unremarkable  CALVARIUM AND EXTRACRANIAL SOFT TISSUES:   Normal      Impression: No acute intracranial abnormality  Workstation performed: [id number]        Recent Cultures (last 7 days):           Last 24 Hours Medication List:     aspirin 81 mg Oral Daily   atorvastatin 40 mg Oral QPM   donepezil 10 mg Oral Daily   escitalopram 5 mg Oral Daily   heparin (porcine) 5,000 Units Subcutaneous Q8H [address]   levothyroxine 75 mcg Oral Early Morning   metoprolol tartrate 25 mg Oral BID        Today, Patient Was Seen By: Jinny Bain MD    ** Please Note: Dragon 360 Dictation voice to text software may have been used in the creation of this document   **

## 2017-11-23 NOTE — PHYSICAL THERAPY NOTE
PT EVALUATION    Pt  Name: Mildred Berger  Pt  Age: 80 y o  Patient Active Problem List   Diagnosis    Altered mental status    Word finding difficulty    Confusion    Essential hypertension    Memory loss    Hyperlipidemia    Hypothyroid    Generalized anxiety disorder       Past Medical History:   Diagnosis Date    Altered mental status 11/22/2017    Anxiety     Basal cell adenocarcinoma     Coronary artery disease     Dementia 2006    Depression     Diverticulosis     Essential hypertension 11/22/2017    Hyperlipidemia     Hypertension     Hypothyroid 11/22/2017    Hypothyroid     Insomnia     MI (myocardial infarction)     Subdural hematoma, post-traumatic ([facility] [address] ) 2014    left sided, treated nonoperatively    Subdural hygroma 2014    s/p subdural hematoma    TIA (transient ischemic attack)     Unstable angina ([facility] [address] ) 2015    Vertigo        Past Surgical History:   Procedure Laterality Date    BREAST LUMPECTOMY Right     CATARACT EXTRACTION      CHOLECYSTECTOMY      HYSTERECTOMY          11/23/17 1120   Note Type   Note type Eval only   Pain Assessment   Pain Score No Pain   Home Living   Type of Home Apartment  ([facility])   Home Layout One level  (0STE)   Home Equipment Walker;Cane   Prior Function   Level of [place] Independent with ADLs and functional mobility  (w/ RW &/or SPC)   Lives With Alone   Receives Help From   [address] in the last 6 months 0   Comments info gathered from pt; pt (+) dementia   Restrictions/Precautions   Weight Bearing Precautions Per Order No   Other Precautions Telemetry; Fall Risk; Chair Alarm; Bed Alarm;Cognitive; Impulsive   General   Family/Caregiver Present No   Cognition   Arousal/Participation Alert   Orientation Level Oriented to person;Oriented to place;Oriented to time   Following Commands Follows one step commands without difficulty   RUE Assessment   RUE Assessment (defer to OT)   LUE Assessment LUE Assessment (defer to OT)   RLE Assessment   RLE Assessment WFL   Strength RLE   RLE Overall Strength 4+/5   LLE Assessment   LLE Assessment WFL   Strength LLE   LLE Overall Strength 4+/5   Coordination   Sensation WFL   Bed Mobility   Additional Comments pt OOB in chair pre & post session   Transfers   Sit to Stand 5  Supervision   Additional items Armrests; Impulsive   Stand to Sit 5  Supervision   Additional items Armrests; Impulsive   Additional Comments pt can be impulsive but can be redirected   Ambulation/Elevation   Gait pattern Decreased foot clearance; Short stride; Inconsistent jarret   Gait Assistance 5  Supervision   Additional items Verbal cues; Tactile cues   Assistive Device Rolling walker   Distance 240'x1   Balance   Static Sitting Normal   Static Standing Fair +   Ambulatory Fair   Endurance Deficit   Endurance Deficit No   Activity Tolerance   Activity Tolerance Patient tolerated treatment well   Nurse Made Aware Roseanne   Assessment   Prognosis Good   Problem List Decreased cognition; Impaired judgement;Decreased safety awareness; Impaired balance   Assessment Pt  80 y  o female admitted for altered mental status in the setting of dementia  (-) stroke per CT  Pt referred to PT for mobility assessment & D/C planning  PTA, pt resident of [address] [place] [address]; pt reports being I w/ RW but admits to noncompliance w/ use of RW at times  On eval, pt appears to be close to her normal baseline function  Pt require S for most functional mobility + cues for techniques  Gait deviations as above but no gross LOB noted  Will continue skilled PT to improve function & safety  At this time, pt will benefit from a more supervised environment such as [facility]  Per RN, pt is in the process of transitioning to [facility] PTA but not finalized yet  If pt unable to transfer to [facility] yet, pt may D/C to her apartment w/ 24hr(S) for safety  CM to follow  Pt remain OOB in chair at end of session w/o issues  Call bell in reach  Chair alarm in placed & activated  Nsg staff to continue to mobilized pt (ambulate in unit & OOB in chair for all meals) as tolerated to prevent further decline in function  Nsg notified  Barriers to Discharge Decreased caregiver support   Barriers to Discharge Comments pt home alone   Goals   Patient Goals go back to [facility]   STG Expiration Date 11/30/17   Short Term Goal #1 Goals to be met in 7 days: 1) inc strength & balance by 1/2 grade; 2) inc functional mobility to modified I; 3) inc amb w/ RW approx  >300' w/ modified I; 4) inc barthel score to 70; 5) pt/caregiver ed   Treatment Day 0   Plan   Treatment/Interventions Functional transfer training;LE strengthening/ROM; Therapeutic exercise; Endurance training;Patient/family training;Bed mobility;Gait training;Spoke to nursing;OT   PT Frequency 5x/wk   Recommendation   Recommendation Other (Comment)  (pt will benefit from a more supervised environment)   Equipment Recommended Walker   PT - OK to Discharge Yes  (when medically cleared)   Barthel Index   Feeding 10   Bathing 0   Grooming Score 5   Dressing Score 5   Bladder Score 5   Bowels Score 10   Toilet Use Score 5   Transfers (Bed/Chair) Score 10   Mobility (Level Surface) Score 10   Stairs Score 0   Barthel Index Score 60   Hx/personal factors: co-morbidities; fall risk; currently functioning below baseline; dementia; home alone; telemetry; use of AD  Examination: minimal dec mobility, dec balance, dec endurance, dec amb, high risk for falls  Clinical: unpredictable (ongoing medical status; abnormal lab values; fall risk)  Complexity: high    Ashwin So, PT

## 2017-11-24 VITALS
HEART RATE: 69 BPM | WEIGHT: 126.32 LBS | OXYGEN SATURATION: 99 % | HEIGHT: 63 IN | DIASTOLIC BLOOD PRESSURE: 78 MMHG | BODY MASS INDEX: 22.38 KG/M2 | TEMPERATURE: 97.9 F | RESPIRATION RATE: 18 BRPM | SYSTOLIC BLOOD PRESSURE: 128 MMHG

## 2017-11-24 RX ORDER — ATORVASTATIN CALCIUM 40 MG/1
40 TABLET, FILM COATED ORAL EVERY EVENING
Qty: 30 TABLET | Refills: 0 | Status: SHIPPED | OUTPATIENT
Start: 2017-11-24 | End: 2018-10-30 | Stop reason: ALTCHOICE

## 2017-11-24 RX ORDER — DONEPEZIL HYDROCHLORIDE 10 MG/1
10 TABLET, FILM COATED ORAL DAILY
Qty: 30 TABLET | Refills: 0 | Status: SHIPPED | OUTPATIENT
Start: 2017-11-25 | End: 2018-05-30 | Stop reason: SDUPTHER

## 2017-11-24 RX ORDER — TEMAZEPAM 7.5 MG/1
7.5 CAPSULE ORAL ONCE AS NEEDED
Status: COMPLETED | OUTPATIENT
Start: 2017-11-24 | End: 2017-11-24

## 2017-11-24 RX ORDER — LEVOTHYROXINE SODIUM 0.07 MG/1
75 TABLET ORAL DAILY
Qty: 30 TABLET | Refills: 0 | Status: SHIPPED | OUTPATIENT
Start: 2017-11-24 | End: 2018-01-31 | Stop reason: SDUPTHER

## 2017-11-24 RX ORDER — ESCITALOPRAM OXALATE 5 MG/1
5 TABLET ORAL DAILY
Qty: 30 TABLET | Refills: 0 | Status: SHIPPED | OUTPATIENT
Start: 2017-11-25 | End: 2018-08-03

## 2017-11-24 RX ADMIN — METOPROLOL TARTRATE 25 MG: 25 TABLET ORAL at 09:14

## 2017-11-24 RX ADMIN — TEMAZEPAM 7.5 MG: 7.5 CAPSULE ORAL at 01:20

## 2017-11-24 RX ADMIN — ASPIRIN 81 MG 81 MG: 81 TABLET ORAL at 09:14

## 2017-11-24 RX ADMIN — ESCITALOPRAM 5 MG: 5 TABLET, FILM COATED ORAL at 09:13

## 2017-11-24 RX ADMIN — HEPARIN SODIUM 5000 UNITS: 5000 INJECTION, SOLUTION INTRAVENOUS; SUBCUTANEOUS at 05:15

## 2017-11-24 RX ADMIN — DONEPEZIL HYDROCHLORIDE 10 MG: 5 TABLET, FILM COATED ORAL at 09:13

## 2017-11-24 RX ADMIN — LEVOTHYROXINE SODIUM 75 MCG: 75 TABLET ORAL at 05:15

## 2017-11-24 NOTE — SOCIAL WORK
Patient is from [facility]  She was in independent living, but family has arranged for her return back there with assisted living services due to her inability to self-administer her medications  CM spoke to daughter and family is going to transport her back there at discharge  CM faxed over to [facility] there, the scripts and the completed DME form

## 2017-11-24 NOTE — PLAN OF CARE

## 2017-11-24 NOTE — PROGRESS NOTES
11/24/2017 0510 = Patient woke up very confused and agitated refused to answer any questions we would ask her  Patient began tearing apart and throwing a manual that she had at her bedside as well as her foam cup  Myself and another nurse tried asking the patient what was wrong but patient refused to answer, the pt remained silent and continued to throw her belongings and became undressed  RRNP notified about patients behavior, d/t patient's allergy to Haldol this was not an option  To avoid further agitation and due to the fact that the patient wasn't being a harm to other we did not want to restrain the patient at this time  RRNP recommended to ask supervisor for advice  (unable to get in contact with supervisor at the time)  The patient kept stating "all you have to do is tell my why you are doing this " "why did you choose me  " " I wonder who is going to win you or I " "I will give you some interesting information, let me see how I can lie "   0618= at this time the patient stopped throwing her belongings, and began to speak about her past in [place]  After she finished her story she stated " there you go you got me to talk " "this would make a good movie wouldn't it "     Patient constantly reoriented and tried to explain the reason why she is here  Patient would just laugh and say "that is funny " "there is a reason why I am here, you just want to get your story out " At times the patient did seem oriented and mention to me everything she just did  Will mention this behavior to the oncoming nurse

## 2017-11-24 NOTE — CASE MANAGEMENT
Was OBS 11/22/17 @0150, changed to INPATIENT 11/22/17 @2217  11/22/17 2218  Inpatient Admission Once     Transfer Service: General Medicine       Question Answer Comment   Admitting Physician Wilmot Bosworth, RICARDO    Level of Care Med Surg    Estimated length of stay More than 2 Midnights    Certification I certify that inpatient services are medically necessary for this patient for a duration of greater than two midnights  See H&P and MD Progress Notes for additional information about the patient's course of treatment          11/22/17 2217

## 2017-11-24 NOTE — DISCHARGE SUMMARY
Discharge Summary - [facility] Internal Medicine    Patient Information: Sherine Adams 80 y o  female MRN: [id number]  Unit/Bed#: E4 -01 Encounter: [id number]    Discharging Physician / Practitioner: Quan Crawford MD  PCP: Suzi Anna DO  Admission Date: 11/21/2017  Discharge Date: 11/24/17    Reason for Admission:  Altered mental status    Discharge Diagnoses:  Anxiety/dementia    Principal Problem:    Altered mental status  Active Problems:    Word finding difficulty    Confusion    Essential hypertension    Memory loss    Hyperlipidemia    Hypothyroid    Generalized anxiety disorder  Resolved Problems:    * No resolved hospital problems  *      Consultations During Hospital Stay:  · Neurology    Procedures Performed:     Ct Head Without Contrast    Result Date: 11/22/2017  Narrative: CT BRAIN - WITHOUT CONTRAST INDICATION:  Confusion  COMPARISON:  10/13/2014  TECHNIQUE:  CT examination of the brain was performed  In addition to axial images, coronal reformatted images were created and submitted for interpretation  Radiation dose length product (DLP) for this visit:  1069 mGy-cm   This examination, like all CT scans performed in the [facility], was performed utilizing techniques to minimize radiation dose exposure, including the use of iterative reconstruction and automated exposure control  IMAGE QUALITY:  Diagnostic  FINDINGS:  PARENCHYMA:  Decreased attenuation is noted in the supratentorial white matter demonstrating an appearance most consistent with moderate microangiopathic change  No intracranial mass, mass effect or midline shift  No CT signs of acute infarction  There is no parenchymal hemorrhage  VENTRICLES AND EXTRA-AXIAL SPACES:  Enlargement of ventricles and extra-axial CSF spaces consistent with cerebral and cerebellar atrophy  VISUALIZED ORBITS AND PARANASAL SINUSES:  Unremarkable   CALVARIUM AND EXTRACRANIAL SOFT TISSUES:   Normal      Impression: No acute intracranial abnormality  Workstation performed: [id number]         Significant Findings:     · * 80year-old female who presented with a an episode of impaired mentation as noted by caregivers at her place of residence where she had been living independently at [facility]  Staff noted that the patient had all her medications disheveled and thrown about and question whether she had taken too much of her blood pressure medications brought her in and was evaluated in the ED in fact had elevated blood pressures not evidence of hypotension she was admitted under observation status  And placed on stroke protocol  · Neurologic evaluation and an MRI were ordered after the CT scan did not show any evidence of bleed or infarct  She refused MRI MRI however remain quite anxious and states that her anxiety is out of control on times as recent for her having these changes  She is otherwise at times quite tangential in her thought with flight of ideas at other times  Neurology evaluation is now consistent when a combination of anxiety / depression and she was placed on Lexapro 5 mg a day she is to continue on Aricept at 10 mg daily no further need for neuro assessments no focal findings found  · Concern now is that the patient cannot manage her medications as she had had before and we waited disposition at [facility] for assisted living arrangement prior to discharge and forms were signed to that effect  · Prescriptions were written for aspirin 81 mg a day, atorvastatin 40 mg daily, Lexapro 5 mg p o  daily, Aricept 10 mg daily,    Incidental Findings:   · * glycosylated hemoglobin 5 5  · Urinalysis within normal limits  · Urine drug screening clear    Test Results Pending at Discharge (will require follow up):    · None     Outpatient Tests Requested:  · * none    Complications:  None    Hospital Course:     Josué Nair is a 80 y o  female patient who originally presented to the hospital on 11/21/2017 due to confusion change in mental status  Please see above significant findings for hospital course and treatment plan    Condition at Discharge: good     Discharge Day Visit / Exam:     * Please refer to separate progress for these details *    Discharge instructions/Information to patient and family:   See after visit summary for information provided to patient and family  Provisions for Follow-Up Care:  See after visit summary for information related to follow-up care and any pertinent home health orders  Disposition:     Assisted Living Facility at [facility]:   · [facility], [address] [place] [address] - Not Applicable to this Patient      Discharge Statement:  I spent56* minutes discharging the patient  This time was spent on the day of discharge  I had direct contact with the patient on the day of discharge  Greater than 50% of the total time was spent examining patient, answering all patient questions, arranging and discussing plan of care with patient as well as directly providing post-discharge instructions  Additional time then spent on discharge activities  Discharge Medications:  See after visit summary for reconciled discharge medications provided to patient and family  ** Please Note: Dragon 360 Dictation voice to text software may have been used in the creation of this document   **

## 2017-11-24 NOTE — PROGRESS NOTES
BOLIVAR paged due to patient being delusional and having hallucinations  Patient states there is something trying to harm her internally  Patient stated she did not wanted to do this anymore, patient was asked if she had any intentions to harm herself patient stated "no I feel like I am harming others"  Both BOLIVAR and I agreed patient is very anxious, KARINANP believes patient would benefit from a low dose of tempazepam  New order for a one time dose of temazepam 7 5mg orally

## 2017-11-25 NOTE — NURSING NOTE
Patient's IV was removed and instructions were reviewed  A w/c Lindsey Crisostomo came in and transported patient from [facility] to Health system

## 2017-11-28 ENCOUNTER — ALLSCRIPTS OFFICE VISIT (OUTPATIENT)
Dept: OTHER | Facility: OTHER | Age: 82
End: 2017-11-28

## 2017-12-06 ENCOUNTER — GENERIC CONVERSION - ENCOUNTER (OUTPATIENT)
Dept: FAMILY MEDICINE CLINIC | Facility: CLINIC | Age: 82
End: 2017-12-06

## 2018-01-11 NOTE — PROGRESS NOTES
Assessment  Assessed    1  Benign essential hypertension (401 1) (I10)   2  Coronary artery disease (414 00) (I25 10)   3  Hyperlipidemia (272 4) (E78 5)    Plan  Coronary artery disease    · Follow-up visit in 4 Months Evaluation and Treatment  Follow-up  Status: Hold For -  Scheduling  Requested for: 42CTI8625   Ordered; For: Coronary artery disease; Ordered By: Cristina Barnard Performed:  Due: 91IHL2294    Discussion/Summary  Cardiology Discussion Summary Free Text Note Form  [facility]:   Patient doing well from a cardiovascular standpoint  We'll continue meds that she is tolerating and followup with patient in 4 months  She is to call if she has any new or worsening cardiac symptoms  Chief Complaint  Chief Complaint Free Text Note Form: 4 month follow up      History of Present Illness  Cardiology HPI Free Text Note Form [facility]: This is a very pleasant 80-year-old female with a significant history of coronary disease, subdural hematoma in May 2014  She was hospitalized at [place] [address] in May 2015 secondary to unstable angina, and a peak troponin of 1 5  Medical management was decided on, as she does have a history of subdural hematoma, and remained asymptomatic  3 hospitalized in June 2015 with recurrent chest pain, after she was emotionally distressed while having dinner with a friend  Her peak troponin was only 0 76  She underwent an echocardiogram that revealed a normal ejection fraction at 55-65% with no regional wall motion abnormalities  She remained asymptomatic throughout her stay and was discharged on aspirin and Plavix  She's also taking atorvastatin and metoprolol, as well as nitroglycerin as needed  She has been feeling very well with no symptoms of chest pain, shortness of breath, lightheadedness or dizziness  She is engaging in denise chi and meditation  She denies any abnormal bleeding or bruising        Review of Systems  Cardiology Female ROS:     Cardiac: No complaints of chest pain, no palpitations, no fainting  Skin: No complaints of nonhealing sores or skin rash  Genitourinary: No complaints of recurrent urinary tract infections, frequent urination at night, difficult urination, blood in urine, kidney stones, loss of bladder control, kidney problems, denies any birth control or hormone replacement, is not post menopausal, not currently pregnant  Psychological: anxiety, but no depression, no panic attacks, no difficulty concentrating and no palpitations present  General: No complaints of trouble sleeping, lack of energy, fatigue, appetite changes, weight changes, fever, frequent infections, or night sweats  Respiratory: No complaints of shortness of breath, cough with sputum, or wheezing  HEENT: No complaints of serious problems, hearing problems, nose problems, throat problems, or snoring  Gastrointestinal: No complaints of liver problems, nausea, vomiting, heartburn, constipation, bloody stools, diarrhea, problems swallowing, adbominal pain, or rectal bleeding  Hematologic: No complaints of bleeding disorders, anemia, blood clots, or excessive brusing  Neurological: No complaints of numbness, tingling, dizziness, weakness, seizures, headaches, syncope or fainting, AM fatigue, daytime sleepiness, no witnessed apnea episodes  Musculoskeletal: No complaints of arthritis, back pain, or painfull swelling  Active Problems  Problems    1  Abnormal blood chemistry (790 6) (R79 9)   2  Abnormal CT scan, neck (793 99) (R93 8)   3  Actinic keratosis (702 0) (L57 0)   4  Acute myocardial infarction (410 90) (I21 3)   5  Ambulatory dysfunction (719 7) (R26 2)   6  Anxiety (300 00) (F41 9)   7  Arthralgia (719 40) (M25 50)   8  Benign essential hypertension (401 1) (I10)   9  Constipation (564 00) (K59 00)   10  Coronary artery disease (414 00) (I25 10)   11  De Quervain's tenosynovitis (727 04) (M65 4)   12  Dizziness (780 4) (R42)   13  Hand pain, unspecified laterality   14  Hyperlipidemia (272 4) (E78 5)   15  Hypothyroidism (244 9) (E03 9)   16  Insomnia (780 52) (G47 00)   17  Limb pain (729 5) (M79 609)   18  Long term current use of antithrombotics/antiplatelets (Y41 46) (F96 29)   19  Memory Lapses Or Loss (780 93)   20  Need for influenza vaccination (V04 81) (Z23)   21  Screening for colon cancer (V76 51) (Z12 11)   22  Shortness of breath (786 05) (R06 02)   23  Subdural hematoma (432 1) (I62 00)   24  Subdural hygroma (432 1) (D18 1)   25  Synovitis And Tenosynovitis Of Hand/Wrist (727 05)   26  Transient ischemic attack (435 9) (G45 9)   27  Unstable angina (411 1) (I20 0)   28  Vitamin D deficiency (268 9) (E55 9)   29  Xerostomia (527 7) (K11 7)    Past Medical History  Problems    1  History of Anxiety (300 00) (F41 9)   2  History of Benign essential hypertension (401 1) (I10)   3  History of Benign paroxysmal vertigo, unspecified laterality (386 11) (H81 10)   4  History of Depression (311) (F32 9)   5  History of Depression (311) (F32 9)   6  History of Diverticulosis (562 10) (K57 90)   7  History of basal cell carcinoma (V10 83) (Z85 828)   8  History of shortness of breath (V13 89) (Z87 898)   9  History of transient cerebral ischemia (V12 54) (Z86 73)   10  History of vertigo (V12 49) (Z87 898)   11  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)    Surgical History  Problems    1  History of Colonoscopy (Fiberoptic)   2  History of Total Abdominal Hysterectomy With Removal Of Both Ovaries    Family History  Mother    1  Family history of Diabetes Mellitus (V18 0)  Father    2  Family history of Colon Cancer (V16 0)  Maternal Grandmother    3  Family history of   Paternal Grandmother    3  Family history of   Maternal Grandfather    5  Family history of   Paternal Grandfather    10   Family history of     Social History  Problems    · Being A Social Drinker   · Living In [place] [address] [place] [address]   · Marital History -    · Never A Smoker    Current Meds   1  Aspirin 81 MG Oral Tablet; take one tab 3 times/week  MWF;   Therapy: (Recorded:25Fmi6305) to Recorded   2  Atorvastatin Calcium 10 MG Oral Tablet; TAKE 1 TABLET DAILY  Requested for:   19ZDE6009; Last Rx:47Zcw2838 Ordered   3  Levoxyl 75 MCG Oral Tablet; TAKE 1 TABLET DAILY; Therapy: 84Lfp9940 to (Evaluate:01Dee7809)  Requested for: 96Wkx1441; Last   Rx:04Waf7597 Ordered   4  Metoprolol Tartrate 25 MG Oral Tablet; Take 1 tablet twice daily  Requested for:   57IYS5022; Last Rx:69Hje7519 Ordered   5  Nitrostat 0 4 MG Sublingual Tablet Sublingual; place 1 tablet under the tongue every 5   minutes up to 3 doses as needed for chest pain; Therapy: 74Glv7908 to (Evaluate:13Jun2015)  Requested for: 95Tys5221; Last   Rx:79Ziu3025 Ordered  Medication List Reviewed: The medication list was reviewed and updated today  Allergies  Medication    1  CeleBREX CAPS   2  ACE Inhibitors   3  Haldol SOLN   4  Sulfa Drugs    Vitals  Vital Signs [Data Includes: Current Encounter]    Recorded: 90ZLY8255 01:13PM   Heart Rate 76   Pulse Quality Normal, L Radial   Respiration 16   Respiration Quality Normal   Systolic 085   Diastolic 72   Height 5 ft 3 in   Weight 134 lb 6 oz   BMI Calculated 23 8   BSA Calculated 1 63     Physical Exam    Constitutional   General appearance: No acute distress, well appearing and well nourished  Eyes   Conjunctiva and Sclera examination: Conjunctiva pink, sclera anicteric  Ears, Nose, Mouth, and Throat - External inspection of ears and nose: Normal without deformities or discharge  Neck   Neck and thyroid: Normal, supple, trachea midline, no thyromegaly  Pulmonary   Respiratory effort: No increased work of breathing or signs of respiratory distress  Auscultation of lungs: Clear to auscultation, no rales, no rhonchi, no wheezing, good air movement      Cardiovascular   Auscultation of heart: Normal rate and rhythm, normal S1 and S2, no murmurs  Carotid pulses: Normal, 2+ bilaterally  Peripheral vascular exam: Normal pulses throughout, no tenderness, erythema or swelling  Examination of extremities for edema and/or varicosities: Normal     Abdomen   Abdomen: Non-tender and no distention  Liver and spleen: No hepatomegaly or splenomegaly  Musculoskeletal Gait and station: Normal gait  Digits and nails: Normal without clubbing or cyanosis  Inspection/palpation of joints, bones, and muscles: Normal, ROM normal     Skin - Skin and subcutaneous tissue: Normal without rashes or lesions  Skin is warm and well perfused, normal turgor  Neurologic - Cranial nerves: II - XII intact   Speech: Normal     Psychiatric - Orientation to person, place, and time: Normal  Mood and affect: Normal       Future Appointments    Date/Time Provider Specialty Site   77/68/6121 33:90 PM Mitch Mustafa DO Family Medicine [facility]     Signatures   Electronically signed by : Avelino Olivas DO; Jan 27 2016  1:55PM EST                       (Author)

## 2018-01-11 NOTE — RESULT NOTES
Verified Results  (1) COMPREHENSIVE METABOLIC PANEL 14WUW0453 90:77ZJ Michelle Garcia Order Number: [id number]     Test Name Result Flag Reference   SODIUM 141 mmol/L  136-145   POTASSIUM 4 3 mmol/L  3 5-5 3   CHLORIDE 106 mmol/L  100-108   CARBON DIOXIDE 31 mmol/L  21-32   ANION GAP (CALC) 4 mmol/L  4-13   BLOOD UREA NITROGEN 18 mg/dL  5-25   CREATININE 0 76 mg/dL  0 60-1 30   Standardized to IDMS reference method   CALCIUM 9 7 mg/dL  8 3-10 1   BILI, TOTAL 0 81 mg/dL  0 20-1 00   ALK PHOSPHATAS 71 U/L     ALT (SGPT) 16 U/L  12-78   Specimen collection should occur prior to Sulfasalazine and/or Sulfapyridine administration due to the potential for falsely depressed results  AST(SGOT) 18 U/L  5-45   Specimen collection should occur prior to Sulfasalazine administration due to the potential for falsely depressed results  ALBUMIN 3 8 g/dL  3 5-5 0   TOTAL PROTEIN 8 2 g/dL  6 4-8 2   eGFR 67 ml/min/1 73sq [facility] Disease Education Program recommendations are as follows:  GFR calculation is accurate only with a steady state creatinine  Chronic Kidney disease less than 60 ml/min/1 73 sq  meters  Kidney failure less than 15 ml/min/1 73 sq  meters  GLUCOSE FASTING 118 mg/dL H 65-99   Specimen collection should occur prior to Sulfasalazine administration due to the potential for falsely depressed results  Specimen collection should occur prior to Sulfapyridine administration due to the potential for falsely elevated results     SODIUM 141 mmol/L  136-145   POTASSIUM 4 3 mmol/L  3 5-5 3   CHLORIDE 106 mmol/L  100-108   CARBON DIOXIDE 31 mmol/L  21-32   ANION GAP (CALC) 4 mmol/L  4-13   BLOOD UREA NITROGEN 18 mg/dL  5-25   CREATININE 0 76 mg/dL  0 60-1 30   Standardized to IDMS reference method   CALCIUM 9 7 mg/dL  8 3-10 1   BILI, TOTAL 0 81 mg/dL  0 20-1 00   ALK PHOSPHATAS 71 U/L     ALT (SGPT) 16 U/L  12-78   Specimen collection should occur prior to Sulfasalazine and/or Sulfapyridine administration due to the potential for falsely depressed results  AST(SGOT) 18 U/L  5-45   Specimen collection should occur prior to Sulfasalazine administration due to the potential for falsely depressed results  ALBUMIN 3 8 g/dL  3 5-5 0   TOTAL PROTEIN 8 2 g/dL  6 4-8 2   eGFR 67 ml/min/1 73sq m     [facility] Disease Education Program recommendations are as follows:  GFR calculation is accurate only with a steady state creatinine  Chronic Kidney disease less than 60 ml/min/1 73 sq  meters  Kidney failure less than 15 ml/min/1 73 sq  meters  GLUCOSE FASTING 118 mg/dL H 65-99   Specimen collection should occur prior to Sulfasalazine administration due to the potential for falsely depressed results  Specimen collection should occur prior to Sulfapyridine administration due to the potential for falsely elevated results                   (1) CBC/ PLT (NO DIFF) [id number] 28:00NT Haley Romero Order Number: [id number]     Test Name Result Flag Reference   HEMATOCRIT 39 7 %  34 8-46 1   HEMOGLOBIN 13 1 g/dL  11 5-15 4   MCHC 33 0 g/dL  31 4-37 4   MCH 32 0 pg  26 8-34 3   MCV 97 fL  82-98   PLATELET COUNT 167 Thousands/uL  149-390   RBC COUNT 4 10 Million/uL  3 81-5 12   RDW 14 0 %  11 6-15 1   WBC COUNT 6 87 Thousand/uL  4 31-10 16   MPV 10 1 fL  8 9-12 7   HEMATOCRIT 39 7 %  34 8-46 1   HEMOGLOBIN 13 1 g/dL  11 5-15 4   MCHC 33 0 g/dL  31 4-37 4   MCH 32 0 pg  26 8-34 3   MCV 97 fL  82-98   PLATELET COUNT 485 Thousands/uL  149-390   RBC COUNT 4 10 Million/uL  3 81-5 12   RDW 14 0 %  11 6-15 1   WBC COUNT 6 87 Thousand/uL  4 31-10 16   MPV 10 1 fL  8 9-12 7                 (1) LIPID PANEL, FASTING [id number] 98:72KS Aroldo ZABALA Order Number: [id number]     Test Name Result Flag Reference   CHOLESTEROL 175 mg/dL     HDL,DIRECT 81 mg/dL H 40-60   Specimen collection should occur prior to Metamizole administration due to the potential for falsley depressed results  LDL CHOLESTEROL CALCULATED 80 mg/dL  0-100   Triglyceride:        Normal <150 mg/dl   Borderline High 150-199 mg/dl   High 200-499 mg/dl   Very High >499 mg/dl      Cholesterol:       Desirable <200 mg/dl    Borderline High 200-239 mg/dl    High >239 mg/dl      HDL Cholesterol:       High>59 mg/dL    Low <41 mg/dL      This screening LDL is a calculated result  It does not have the accuracy of the Direct Measured LDL in the monitoring of patients with hyperlipidemia and/or statin therapy  Direct Measure LDL (MIY631) must be ordered separately in these patients  TRIGLYCERIDES 69 mg/dL  <=150   Specimen collection should occur prior to N-Acetylcysteine or Metamizole administration due to the potential for falsely depressed results  CHOLESTEROL 175 mg/dL     HDL,DIRECT 81 mg/dL H 40-60   Specimen collection should occur prior to Metamizole administration due to the potential for falsley depressed results  LDL CHOLESTEROL CALCULATED 80 mg/dL  0-100   Triglyceride:        Normal <150 mg/dl   Borderline High 150-199 mg/dl   High 200-499 mg/dl   Very High >499 mg/dl      Cholesterol:       Desirable <200 mg/dl    Borderline High 200-239 mg/dl    High >239 mg/dl      HDL Cholesterol:       High>59 mg/dL    Low <41 mg/dL      This screening LDL is a calculated result  It does not have the accuracy of the Direct Measured LDL in the monitoring of patients with hyperlipidemia and/or statin therapy  Direct Measure LDL (TUZ887) must be ordered separately in these patients  TRIGLYCERIDES 69 mg/dL  <=150   Specimen collection should occur prior to N-Acetylcysteine or Metamizole administration due to the potential for falsely depressed results                   (1) TSH [id number] 28:85RD [facility] Order Number: [id number]     Test Name Result Flag Reference   TSH 2 620 uIU/mL  0 358-3 740   Patients undergoing fluorescein dye angiography may retain small amounts of fluorescein in the body for 48-72 hours post procedure  Samples containing fluorescein can produce falsely depressed TSH values  If the patient had this procedure,a specimen should be resubmitted post fluorescein clearance            The recommended reference ranges for TSH during pregnancy are as follows:  First trimester 0 1 to 2 5 uIU/mL  Second trimester  0 2 to 3 0 uIU/mL  Third trimester 0 3 to 3 0 uIU/m

## 2018-01-11 NOTE — MISCELLANEOUS
Assessment    1  Altered mental status (780 97) (R41 82)   2  Mild cognitive impairment (331 83) (G31 84)   3  Anxiety (300 00) (F41 9)   4  Hyperlipidemia (272 4) (E78 5)   5  Benign essential hypertension (401 1) (I10)    Plan  Anxiety    · From  Escitalopram Oxalate 10 MG Oral Tablet  To Escitalopram Oxalate 5 MG  Oral Tablet TAKE 1 TABLET DAILY   Rx By: Ashley Ely; Dispense: 30 Days ; #:30 Tablet; Refill: 0; For: Anxiety; DEYANIRA = N; Record  Memory loss    · Donepezil HCl - 10 MG Oral Tablet; take 1 tablet by mouth daily as directed   Rx By: Duane Meehan; Dispense: 30 Days ; #:30 Tablet; Refill: 0; For: Memory loss; EDYANIRA = N; Record; Last Updated By: Ashley Ely; 11/28/2017 4:31:02 PM    Discussion/Summary  Discussion Summary:     1& 2  Altered Mental Status and Mild Cognitive Impairment: Patient recently transitioned from independent living to assisted living in [facility]  Patient to continue with Aricept  3  Anxiety: Patient just recently started 5mg Lexapro  patient to continue medication  Patient educated that it will take 4-6 weeks to feel full effects of medications  Medication can be increased as needed  Will continue to monitor  4  Hyperlipidemia: Last Lipid Panel was September 2017 was stable  Due to age, I will discontinue Atorvastatin due to patient's age and to preserve quality of life  Risk and benefits discussed with patient and son and they all agree  5  Hypertension: Stable  //64 today in office  Continue Metoprolol 25mg BID  Will follow up with patient in April 2018  Counseling Documentation With Imm: The patient, patient's family was counseled regarding diagnostic results, instructions for management, risks and benefits of treatment options, importance of compliance with treatment  total time of encounter was 35 minutes and >50% minutes was spent counseling        Chief Complaint  Chief Complaint Free Text Note Form: Pt presented with her son for JAKOB, c/o anxiety at night and difficulty falling and staying asleep  martín blackburn      History of Present Illness  Mental Status Changes: The patient is being seen for follow-up of a hospitalization for mental status changes  The patient is currently asymptomatic  The patient is currently experiencing symptoms  TCM Communication [facility]: The patient is being contacted for follow-up after hospitalization  Hospital records were reviewed  She was hospitalized at [facility]  The date of admission: 11/22/17, date of discharge: 11/24/17  Diagnosis: altered mental status  She was discharged to an assisted living facility  Medications reviewed and updated today  She scheduled a follow up appointment  The patient is currently asymptomatic  Counseling was provided to the patient and patient's family  Communication performed and completed by martín blackburn   HPI: Patient presents to office today for follow up regarding recent hospitalization for change in cognition  Patient went back to [facility] but now lives in the assisted living portion instead of the independent living  Patient is waking up at night with anxiety  Patient has been there for 3 nights total  Her son, Isa Price, is staying with her  Review of Systems  Complete-Female:   Constitutional: no fever  Eyes: no eyesight problems  Cardiovascular: no chest pain and no palpitations  Respiratory: no shortness of breath  Gastrointestinal: no vomiting    The patient presents with complaints of nausea (related to anxiety at night, but no vomiting )  Genitourinary: no dysuria and no incontinence  Neurological: no headache, no dizziness and no fainting  The patient presents with complaints of anxiety (waking up at night with anxiety )  ROS Reviewed:   ROS reviewed  Active Problems    1  Actinic keratosis (702 0) (L57 0)   2  Ambulatory dysfunction (719 7) (R26 2)   3  Anxiety (300 00) (F41 9)   4  Arthralgia (719 40) (M25 50)   5   Benign essential hypertension (401 1) (I10)   6  Constipation (564 00) (K59 00)   7  Coronary artery disease (414 00) (I25 10)   8  De Quervain's tenosynovitis (727 04) (M65 4)   9  Dry skin (701 1) (L85 3)   10  Hand pain, unspecified laterality   11  Hyperlipidemia (272 4) (E78 5)   12  Hypothyroidism (244 9) (E03 9)   13  Insomnia (780 52) (G47 00)   14  Medicare annual wellness visit, subsequent (V70 0) (Z00 00)   15  Memory loss (780 93) (R41 3)   16  Need for immunization against influenza (V04 81) (Z23)   17  Need for influenza vaccination (V04 81) (Z23)   18  Need for pneumococcal vaccination (V03 82) (Z23)   19  Need for Tdap vaccination (V06 1) (Z23)   20  Screening for colon cancer (V76 51) (Z12 11)   21  Screening for depression (V79 0) (Z13 89)   22  Screening for other and unspecified genitourinary condition (V81 6) (Z13 89)   23  Special screening for other neurological conditions (V80 09) (Z13 89)   24  Subdural hygroma (432 1) (D18 1)   25  Synovitis And Tenosynovitis Of Hand/Wrist (727 05)   26  Transient ischemic attack (435 9) (G45 9)   27  Unstable angina (411 1) (I20 0)   28  Vitamin D deficiency (268 9) (E55 9)   29  Xerostomia (527 7) (R68 2)    Past Medical History    1  History of Abnormal blood chemistry (790 6) (R79 9)   2  History of Abnormal CT scan, neck (793 99) (R93 8)   3  History of Acute myocardial infarction (410 90) (I21 9)   4  History of Acute URI (465 9) (J06 9)   5  History of Anxiety (300 00) (F41 9)   6  History of Benign essential hypertension (401 1) (I10)   7  History of Benign paroxysmal vertigo, unspecified laterality (386 11) (H81 10)   8  History of Depression (311) (F32 9)   9  History of Depression (311) (F32 9)   10  History of Diverticulosis (562 10) (K57 90)   11  History of basal cell carcinoma (V10 83) (Z85 828)   12  History of dizziness (V13 89) (Z87 898)   13  History of fatigue (V13 89) (Z87 898)   14  History of shortness of breath (V13 89) (Z87 898)   15  History of shortness of breath (V13 89) (Z87 898)   16  History of subdural hematoma (V12 59) (Z86 79)   17  History of transient cerebral ischemia (V12 54) (Z86 73)   18  History of vertigo (V12 49) (Z87 898)   19  History of Limb pain (729 5) (M79 609)   20  History of Long term current use of antithrombotics/antiplatelets (D92 82) (H21 11)   21  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)   22  History of Need for influenza vaccination (V04 81) (Z23)   23  History of Special screening for other neurological conditions (V80 09) (Z13 89)    Surgical History    1  History of Colonoscopy (Fiberoptic)   2  History of Total Abdominal Hysterectomy With Removal Of Both Ovaries    Family History  Mother    1  Family history of Diabetes Mellitus (V18 0)  Father    2  Family history of Colon Cancer (V16 0)  Maternal Grandmother    3  Family history of   Paternal Grandmother    3  Family history of   Maternal Grandfather    5  Family history of   Paternal Grandfather    10  Family history of     Social History    · Being A Social Drinker   · Living In An [address] [place] [address]   · Marital History -    · Never A Smoker  Social History Reviewed: The social history was reviewed and updated today  The social history was reviewed and is unchanged  Current Meds   1  Ammonium Lactate 12 % External Cream; APPLY  AND RUB  IN A THIN FILM TO   AFFECTED AREAS TWICE DAILY  (AM AND PM); Therapy: 19ISN9606 to (Last Rx:2017)  Requested for: 96RBU9617 Ordered   2  Aspirin 81 MG TABS; take one tab 3 times/week  MW;   Therapy: (Recorded:2015) to Recorded   3  Atorvastatin Calcium 10 MG Oral Tablet; TAKE 1 TABLET DAILY; Therapy: 30MGN6131 to (Evaluate:39Xoo0779)  Requested for: 18Rjx0029; Last   Rx:55Rxf0853 Ordered   4  Donepezil HCl - 5 MG Oral Tablet; TAKE ONE TABLET BY MOUTH AT BEDTIME;    Therapy: 67XJG0982 to (Evaluate:97Bgc1716)  Requested for: 77XKT9004; Last   DS:03WYI9702 Ordered   5  Escitalopram Oxalate 10 MG Oral Tablet; Therapy: (Recorded:28Nov2017) to Recorded   6  Levoxyl 75 MCG Oral Tablet; TAKE 1 TABLET DAILY; Therapy: 91Tmc1748 to (Evaluate:30Kmq0493)  Requested for: 81Blx7857; Last   Rx:74Cah2097 Ordered   7  Metoprolol Tartrate 25 MG Oral Tablet; take 1 tablet by mouth twice a day; Therapy: 60GFZ2936 to (Evaluate:25Vdj5023)  Requested for: 17Nvo3986; Last   Rx:58Lcm5003 Ordered  Medication List Reviewed: The medication list was reviewed and updated today  Allergies    1  CeleBREX CAPS   2  ACE Inhibitors   3  Haldol SOLN   4  Sulfa Drugs    Vitals  Signs   Recorded: 60AKV8444 64:77VR   Systolic: 607  Diastolic: 64  Height: 5 ft 1 2 in  Weight: 127 lb   BMI Calculated: 23 84  BSA Calculated: 1 56    Physical Exam    Constitutional   General appearance: No acute distress, well appearing and well nourished  appears healthy and well nourished  Eyes   Conjunctiva and lids: No swelling, erythema or discharge  Ears, Nose, Mouth, and Throat   External inspection of ears and nose: Normal     Pulmonary   Respiratory effort: No increased work of breathing or signs of respiratory distress  Auscultation of lungs: Clear to auscultation  Cardiovascular   Palpation of heart: Normal PMI, no thrills  Auscultation of heart: Normal rate and rhythm, normal S1 and S2, without murmurs  Examination of extremities for edema and/or varicosities: Normal     Abdomen Bowel sounds present  Musculoskeletal Steady, uses personal walker with seat     Psychiatric   Orientation to person, place, and time: Normal     Mood and affect: Normal          Future Appointments    Date/Time Provider Specialty Site   25/99/1239 32:35 PM Lashonda Nunez 408   Electronically signed by : Emilie Olea DO; Nov 29 6960  8:23AM EST                       (Author)

## 2018-01-12 NOTE — RESULT NOTES
Instructions: This is a fasting blood test  Water,black tea or black  coffee only after 9:00pm the night before test Drink 2 glasses of water the morning of test - Patient Instructions: This bloodwork is non-fasting  Please drink two glasses of   water morning of bloodwork  National Kidney Disease Education Program recommendations are as follows:  GFR calculation is accurate only with a steady state creatinine  Chronic Kidney disease less than 60 ml/min/1 73 sq  meters  Kidney failure less than 15 ml/min/1 73 sq  meters  GLUCOSE,RANDM 95 mg/dL     If the patient is fasting, the ADA then defines impaired fasting glucose as > 100 mg/dL and diabetes as > or equal to 123 mg/dL  SODIUM 140 mmol/L  136-145   POTASSIUM 4 5 mmol/L  3 5-5 3   CHLORIDE 106 mmol/L  100-108   CARBON DIOXIDE 30 mmol/L  21-32   ANION GAP (CALC) 4 mmol/L  4-13   BLOOD UREA NITROGEN 18 mg/dL  5-25   CREATININE 0 67 mg/dL  0 60-1 30   Standardized to IDMS reference method   CALCIUM 9 3 mg/dL  8 3-10 1   BILI, TOTAL 0 83 mg/dL  0 20-1 00   ALK PHOSPHATAS 73 U/L     ALT (SGPT) 21 U/L  12-78   AST(SGOT) 15 U/L  5-45   ALBUMIN 3 6 g/dL  3 5-5 0   TOTAL PROTEIN 7 2 g/dL  6 4-8 2   eGFR Non-African American      >60 0 ml/min/1 73sq m   - Patient Instructions: This is a fasting blood test  Water,black tea or black  coffee only after 9:00pm the night before test Drink 2 glasses of water the morning of test - Patient Instructions: This bloodwork is non-fasting  Please drink two glasses of   water morning of bloodwork  National Kidney Disease Education Program recommendations are as follows:  GFR calculation is accurate only with a steady state creatinine  Chronic Kidney disease less than 60 ml/min/1 73 sq  meters  Kidney failure less than 15 ml/min/1 73 sq  meters                   (1) CBC/ PLT (NO DIFF) 66PSJ2783 49:44CZ Jeannette Chavez Order Number: [id number]     Test Name Result Flag Reference   HEMATOCRIT 38 3 %  34 8-46 1 HEMOGLOBIN 12 3 g/dL  11 5-15 4   MCHC 32 1 g/dL  31 4-37 4   MCH 31 2 pg  26 8-34 3   MCV 97 fL  82-98   PLATELET COUNT 533 Thousands/uL  149-390   RBC COUNT 3 94 Million/uL  3 81-5 12   RDW 13 6 %  11 6-15 1   WBC COUNT 7 37 Thousand/uL  4 31-10 16   MPV 10 0 fL  8 9-12 7   HEMATOCRIT 38 3 %  34 8-46 1   HEMOGLOBIN 12 3 g/dL  11 5-15 4   MCHC 32 1 g/dL  31 4-37 4   MCH 31 2 pg  26 8-34 3   MCV 97 fL  82-98   PLATELET COUNT 997 Thousands/uL  149-390   RBC COUNT 3 94 Million/uL  3 81-5 12   RDW 13 6 %  11 6-15 1   WBC COUNT 7 37 Thousand/uL  4 31-10 16   MPV 10 0 fL  8 9-12 7                 (1) LIPID PANEL, FASTING 53WKJ4872 71:43EO Haileymaegan Rodriguez Order Number: [id number]     Test Name Result Flag Reference   CHOLESTEROL 138 mg/dL     HDL,DIRECT 65 mg/dL H 40-60   Specimen collection should occur prior to Metamizole administration due to the potential for falsely depressed results  LDL CHOLESTEROL CALCULATED 53 mg/dL  0-100   - Patient Instructions: This is a fasting blood test  Water,black tea or black  coffee only after 9:00pm the night before test   Drink 2 glasses of water the morning of test     - Patient Instructions: This is a fasting blood test  Water,black tea or black  coffee only after 9:00pm the night before test Drink 2 glasses of water the morning of test - Patient Instructions: This bloodwork is non-fasting  Please drink two glasses of   water morning of bloodwork  Triglyceride:         Normal              <150 mg/dl       Borderline High    150-199 mg/dl       High               200-499 mg/dl       Very High          >499 mg/dl  Cholesterol:         Desirable        <200 mg/dl      Borderline High  200-239 mg/dl      High             >239 mg/dl  HDL Cholesterol:        High    >59 mg/dL      Low     <41 mg/dL  LDL CALCULATED:    This screening LDL is a calculated result    It does not have the accuracy of the Direct Measured LDL in the monitoring of patients with hyperlipidemia and/or statin therapy  Direct Measure LDL (ORU389) must be ordered separately in these patients  TRIGLYCERIDES 101 mg/dL  <=150   Specimen collection should occur prior to N-Acetylcysteine or Metamizole administration due to the potential for falsely depressed results  CHOLESTEROL 138 mg/dL     HDL,DIRECT 65 mg/dL H 40-60   Specimen collection should occur prior to Metamizole administration due to the potential for falsely depressed results  LDL CHOLESTEROL CALCULATED 53 mg/dL  0-100   - Patient Instructions: This is a fasting blood test  Water,black tea or black  coffee only after 9:00pm the night before test   Drink 2 glasses of water the morning of test     - Patient Instructions: This is a fasting blood test  Water,black tea or black  coffee only after 9:00pm the night before test Drink 2 glasses of water the morning of test - Patient Instructions: This bloodwork is non-fasting  Please drink two glasses of   water morning of bloodwork  Triglyceride:         Normal              <150 mg/dl       Borderline High    150-199 mg/dl       High               200-499 mg/dl       Very High          >499 mg/dl  Cholesterol:         Desirable        <200 mg/dl      Borderline High  200-239 mg/dl      High             >239 mg/dl  HDL Cholesterol:        High    >59 mg/dL      Low     <41 mg/dL  LDL CALCULATED:    This screening LDL is a calculated result  It does not have the accuracy of the Direct Measured LDL in the monitoring of patients with hyperlipidemia and/or statin therapy  Direct Measure LDL (JZR911) must be ordered separately in these patients  TRIGLYCERIDES 101 mg/dL  <=150   Specimen collection should occur prior to N-Acetylcysteine or Metamizole administration due to the potential for falsely depressed results                   (1) VITAMIN B12 89AIP5797 33:38EE Al Hinds Order Number: [id number]     Test Name Result Flag Reference   VITAMIN B12 778 pg/mL 100-900   - Patient Instructions: This is a fasting blood test  Water,black tea or black  coffee only after 9:00pm the night before test Drink 2 glasses of water the morning of test - Patient Instructions: This bloodwork is non-fasting  Please drink two glasses of   water morning of bloodwork

## 2018-01-13 VITALS
BODY MASS INDEX: 23.98 KG/M2 | HEIGHT: 61 IN | DIASTOLIC BLOOD PRESSURE: 64 MMHG | WEIGHT: 127 LBS | SYSTOLIC BLOOD PRESSURE: 122 MMHG

## 2018-01-13 VITALS
WEIGHT: 130 LBS | DIASTOLIC BLOOD PRESSURE: 62 MMHG | HEIGHT: 63 IN | SYSTOLIC BLOOD PRESSURE: 128 MMHG | BODY MASS INDEX: 23.04 KG/M2

## 2018-01-13 VITALS
SYSTOLIC BLOOD PRESSURE: 120 MMHG | WEIGHT: 127 LBS | BODY MASS INDEX: 23.98 KG/M2 | HEIGHT: 61 IN | DIASTOLIC BLOOD PRESSURE: 70 MMHG

## 2018-01-14 VITALS
WEIGHT: 127.13 LBS | SYSTOLIC BLOOD PRESSURE: 146 MMHG | BODY MASS INDEX: 24 KG/M2 | DIASTOLIC BLOOD PRESSURE: 72 MMHG | HEIGHT: 61 IN

## 2018-01-14 VITALS
SYSTOLIC BLOOD PRESSURE: 122 MMHG | BODY MASS INDEX: 24.21 KG/M2 | WEIGHT: 128.25 LBS | DIASTOLIC BLOOD PRESSURE: 68 MMHG | HEIGHT: 61 IN

## 2018-01-15 NOTE — PROGRESS NOTES
Assessment    1  Benign essential hypertension (401 1) (I10)   2  Xerostomia (527 7) (K11 7)    Plan  Benign essential hypertension    · Begin a limited exercise program ; Status:Complete;   Done: 08IUR8306 03:38PM   · Eat a low fat and low cholesterol diet ; Status:Complete;   Done: 55TWP9723 03:38PM   · Take your blood pressure twice a day, varying the time of day you check it  Record the  numbers, and bring them with you to your appointment ; Status:Complete;   Done:  29ONI7197 03:38PM   · We encourage you to begin to make lifestyle changes to help control your blood  pressure  These may include losing weight, increasing your activity level, limiting salt in  your diet, decreasing alcohol intake, and eating a diet low in fat and rich in fruits  and vegetables ; Status:Complete;   Done: 35NHH0771 03:38PM   · We recommend that you follow the "Mediterranean diet "; Status:Complete;   Done:  39QDM9232 03:38PM   · Call [phone] if: You become dizzy or lightheaded, especially when you stand up  after sitting for a while ; Status:Complete;   Done: 15VXJ8144 03:38PM   · Call [phone] if: Your blood pressure is frequently higher than 140/90 ;  Status:Complete;   Done: 57OIH0117 03:38PM   · Call 911 if: You experience a new kind of chest pain (angina) or pressure ;  Status:Complete;   Done: 05LLO4491 03:38PM   · Call 911 if: You have any symptoms of a stroke ; Status:Complete;   Done: 66ZZS2704  03:38PM   · Seek Immediate Medical Attention if: You have a severe headache that will not go away ;  Status:Complete;   Done: 59WFE9784 03:38PM    Discussion/Summary    BP stable  Labs May  Prevnar today  Chief Complaint  PT IS HERE FOR A THREE MONTH FU  History of Present Illness  The patient presents for follow-up of essential hypertension  The patient states she has been doing well with her blood pressure control since the last visit  Symptoms: The patient is currently asymptomatic  BP ck   feeling well  still with dry mouth  Cardio UTD  diet very healthy  doing denise chi      Review of Systems    Constitutional: No fever, no chills, feels well, no tiredness, no recent weight gain or weight loss  Cardiovascular: No complaints of slow heart rate, no fast heart rate, no chest pain, no palpitations, no leg claudication, no lower extremity edema  Respiratory: No complaints of shortness of breath, no wheezing, no cough, no SOB on exertion, no orthopnea, no PND  Active Problems    1  Abnormal blood chemistry (790 6) (R79 9)   2  Abnormal CT scan, neck (793 99) (R93 8)   3  Actinic keratosis (702 0) (L57 0)   4  Acute myocardial infarction (410 90) (I21 3)   5  Ambulatory dysfunction (719 7) (R26 2)   6  Anxiety (300 00) (F41 9)   7  Arthralgia (719 40) (M25 50)   8  Benign essential hypertension (401 1) (I10)   9  Constipation (564 00) (K59 00)   10  Coronary artery disease (414 00) (I25 10)   11  De Quervain's tenosynovitis (727 04) (M65 4)   12  Dizziness (780 4) (R42)   13  Hand pain, unspecified laterality   14  Hyperlipidemia (272 4) (E78 5)   15  Hypothyroidism (244 9) (E03 9)   16  Insomnia (780 52) (G47 00)   17  Limb pain (729 5) (M79 609)   18  Long term current use of antithrombotics/antiplatelets (P15 27) (Z88 44)   19  Memory Lapses Or Loss (780 93)   20  Need for influenza vaccination (V04 81) (Z23)   21  Screening for colon cancer (V76 51) (Z12 11)   22  Shortness of breath (786 05) (R06 02)   23  Subdural hematoma (432 1) (I62 00)   24  Subdural hygroma (432 1) (D18 1)   25  Synovitis And Tenosynovitis Of Hand/Wrist (727 05)   26  Transient ischemic attack (435 9) (G45 9)   27  Unstable angina (411 1) (I20 0)   28  Vitamin D deficiency (268 9) (E55 9)   29  Xerostomia (527 7) (K11 7)    Past Medical History    1  History of Anxiety (300 00) (F41 9)   2  History of Benign essential hypertension (401 1) (I10)   3   History of Benign paroxysmal vertigo, unspecified laterality (386 11) (H81 10) 4  History of Depression (311) (F32 9)   5  History of Depression (311) (F32 9)   6  History of Diverticulosis (562 10) (K57 90)   7  History of basal cell carcinoma (V10 83) (Z85 828)   8  History of shortness of breath (V13 89) (Z87 898)   9  History of transient cerebral ischemia (V12 54) (Z86 73)   10  History of vertigo (V12 49) (Z87 898)   11  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)    Surgical History    1  History of Colonoscopy (Fiberoptic)   2  History of Total Abdominal Hysterectomy With Removal Of Both Ovaries    Family History    1  Family history of Diabetes Mellitus (V18 0)    2  Family history of Colon Cancer (V16 0)    3  Family history of     4  Family history of     5  Family history of     6  Family history of     Social History    · Being A Social Drinker   · Living In An [address] [place] [address]   · Marital History -    · Never A Smoker    Current Meds   1  Aspirin 81 MG Oral Tablet; take one tab 3 times/week  MW;   Therapy: (Recorded:07Wdm9331) to Recorded   2  Atorvastatin Calcium 10 MG Oral Tablet; TAKE 1 TABLET DAILY  Requested for:   27NEU6012; Last Rx:33Fec4974 Ordered   3  Levoxyl 75 MCG Oral Tablet; TAKE 1 TABLET DAILY; Therapy: 39Uji5439 to (Evaluate:79Oun8748)  Requested for: 66Fan8183; Last   Rx:28Fxt4259 Ordered   4  Metoprolol Tartrate 25 MG Oral Tablet; Take 1 tablet twice daily  Requested for:   24MFI3481; Last Rx:26Hnm3070 Ordered   5  Nitrostat 0 4 MG Sublingual Tablet Sublingual; place 1 tablet under the tongue every 5   minutes up to 3 doses as needed for chest pain; Therapy: 53Ega3637 to (Evaluate:50Ohw6663)  Requested for: 11Qjf5544; Last   Rx:09Vqm3500 Ordered    Allergies    1  CeleBREX CAPS   2  ACE Inhibitors   3  Haldol SOLN   4   Sulfa Drugs    Vitals  Vital Signs [Data Includes: Current Encounter]    Recorded: 03JTQ7885 29:30BQ   Systolic 876   Diastolic 66   Height 5 ft 3 in   Weight 132 lb BMI Calculated 23 38   BSA Calculated 1 62     Physical Exam    Constitutional   General appearance: No acute distress, well appearing and well nourished  Pulmonary   Auscultation of lungs: Clear to auscultation  Cardiovascular   Auscultation of heart: Normal rate and rhythm, normal S1 and S2, without murmurs      Examination of extremities for edema and/or varicosities: Normal     Carotid pulses: Normal          Future Appointments    Date/Time Provider Specialty Site   05/25/2016 01:20 PM Vlad Steinberg DO Cardiology  [place]     Signatures   Electronically signed by : Cresencio Ramos DO; Feb  3 9455  3:38PM EST                       (Author)

## 2018-01-31 DIAGNOSIS — E03.9 ACQUIRED HYPOTHYROIDISM: Primary | ICD-10-CM

## 2018-01-31 RX ORDER — LEVOTHYROXINE SODIUM 0.07 MG/1
1 TABLET ORAL DAILY
COMMUNITY
Start: 2012-04-20 | End: 2018-07-25 | Stop reason: SDUPTHER

## 2018-01-31 RX ORDER — LEVOTHYROXINE SODIUM 0.07 MG/1
TABLET ORAL
Qty: 30 TABLET | Refills: 4 | Status: SHIPPED | OUTPATIENT
Start: 2018-01-31 | End: 2018-02-16 | Stop reason: SDUPTHER

## 2018-02-16 ENCOUNTER — OFFICE VISIT (OUTPATIENT)
Dept: FAMILY MEDICINE CLINIC | Facility: CLINIC | Age: 83
End: 2018-02-16
Payer: MEDICARE

## 2018-02-16 VITALS
HEIGHT: 63 IN | WEIGHT: 116 LBS | BODY MASS INDEX: 20.55 KG/M2 | SYSTOLIC BLOOD PRESSURE: 120 MMHG | DIASTOLIC BLOOD PRESSURE: 62 MMHG

## 2018-02-16 DIAGNOSIS — F41.9 ANXIETY: ICD-10-CM

## 2018-02-16 DIAGNOSIS — F03.90 DEMENTIA WITHOUT BEHAVIORAL DISTURBANCE, UNSPECIFIED DEMENTIA TYPE (HCC): Primary | ICD-10-CM

## 2018-02-16 DIAGNOSIS — I10 ESSENTIAL HYPERTENSION: Chronic | ICD-10-CM

## 2018-02-16 PROBLEM — R26.2 AMBULATORY DYSFUNCTION: Status: ACTIVE | Noted: 2018-02-16

## 2018-02-16 PROCEDURE — 99214 OFFICE O/P EST MOD 30 MIN: CPT | Performed by: FAMILY MEDICINE

## 2018-02-16 RX ORDER — ALPRAZOLAM 0.25 MG/1
1 TABLET ORAL AS NEEDED
COMMUNITY
Start: 2017-12-12 | End: 2019-07-03 | Stop reason: SDUPTHER

## 2018-02-16 RX ORDER — DIVALPROEX SODIUM 250 MG/1
1 TABLET, DELAYED RELEASE ORAL
COMMUNITY
Start: 2018-01-12 | End: 2018-05-30 | Stop reason: SDUPTHER

## 2018-02-16 RX ORDER — AMMONIUM LACTATE 12 G/100G
CREAM TOPICAL 2 TIMES DAILY
COMMUNITY
Start: 2017-01-18 | End: 2018-10-30 | Stop reason: ALTCHOICE

## 2018-02-16 NOTE — ASSESSMENT & PLAN NOTE
Once again reviewed the patient's November hospitalization  Patient today is kind of all over the place with her comments  There are no acute mental status changes today  Patient just seems worried about where she is living now at [facility] since it is more regulated compared to where she was  Recommend continue present medications  Recommend routine referral to Neurology which prior to today she has declined  I did review with her the Neurology and hospital records from her last admission    She will continue to keep her visit with us

## 2018-02-16 NOTE — PROGRESS NOTES
Assessment/Plan:      Diagnoses and all orders for this visit:    Dementia without behavioral disturbance, unspecified dementia type  -     Ambulatory referral to Neurology; Future    Essential hypertension    Anxiety    Other orders  -     divalproex sodium (DEPAKOTE) 250 mg EC tablet; Take 1 tablet by mouth  -     ammonium lactate (LAC-HYDRIN) 12 % cream; Apply topically Twice daily  -     ALPRAZolam (XANAX) 0 25 mg tablet; Take 1 tablet by mouth          Subjective:     Patient ID: Amor Guzman is a 80 y o  female  80year old patient here today for acute office visit regarding to review her memory  She would like to review her last hospitalization  She is having some issues with constipation  Patient has a list with her to go over multiple things  She is upset that she had to move from 1 area of [facility] to another area that is more controlled  She sleeps poorly  Review of Systems   Constitutional: Negative  HENT: Negative  Respiratory: Negative  Gastrointestinal: Positive for constipation  Endocrine: Negative for cold intolerance and heat intolerance  Genitourinary: Negative for dysuria  Musculoskeletal: Positive for gait problem  Neurological: Negative for dizziness, speech difficulty and headaches  Psychiatric/Behavioral: The patient is nervous/anxious  Objective:     Physical Exam   Constitutional: She is oriented to person, place, and time  She appears well-developed and well-nourished  No distress  HENT:   Head: Normocephalic and atraumatic  Right Ear: External ear normal    Left Ear: External ear normal    Mouth/Throat: Oropharynx is clear and moist    Eyes: Conjunctivae and EOM are normal  Pupils are equal, round, and reactive to light  No scleral icterus  Neck: Normal range of motion  Neck supple  No thyromegaly present  Cardiovascular: Normal rate, regular rhythm and intact distal pulses  No murmur heard    Pulmonary/Chest: Effort normal and breath sounds normal  She has no wheezes  Abdominal: Soft  Bowel sounds are normal  She exhibits no distension  There is no tenderness  Musculoskeletal: Normal range of motion  Lymphadenopathy:     She has no cervical adenopathy  Neurological: She is alert and oriented to person, place, and time  She displays normal reflexes  Coordination normal    Skin: Skin is warm  No pallor  Psychiatric: She has a normal mood and affect  Her behavior is normal    Patient's thought pattern is a little off today as she kind of keeps changing the topic from 1 issue to another almost not letting me even time to answer certain questions

## 2018-02-20 ENCOUNTER — TELEPHONE (OUTPATIENT)
Dept: FAMILY MEDICINE CLINIC | Facility: CLINIC | Age: 83
End: 2018-02-20

## 2018-02-20 NOTE — TELEPHONE ENCOUNTER
Shyann from [facility] states they still have not received her paperwork from her 2/16/18 visit which the patient brought with her, they need this returned as soon as possible  Shyann states on 2/14/18 they also faxed us a problem about her experiencing some indigestion and was requesting an order for Maalox on a prn, so can we also please do this    Please fax back to:  [phone]

## 2018-02-21 NOTE — TELEPHONE ENCOUNTER
I spoke to Shyann at [facility], I did fax her an order for the prn Maalox, and we did discuss there were no new meds/changes as a result of patient's 2/16/18 visit

## 2018-03-23 ENCOUNTER — OFFICE VISIT (OUTPATIENT)
Dept: NEUROLOGY | Facility: CLINIC | Age: 83
End: 2018-03-23
Payer: MEDICARE

## 2018-03-23 VITALS
WEIGHT: 113.8 LBS | RESPIRATION RATE: 14 BRPM | SYSTOLIC BLOOD PRESSURE: 113 MMHG | HEART RATE: 70 BPM | DIASTOLIC BLOOD PRESSURE: 62 MMHG | HEIGHT: 63 IN | BODY MASS INDEX: 20.16 KG/M2

## 2018-03-23 DIAGNOSIS — F03.90 DEMENTIA WITHOUT BEHAVIORAL DISTURBANCE, UNSPECIFIED DEMENTIA TYPE (HCC): ICD-10-CM

## 2018-03-23 DIAGNOSIS — F41.9 ANXIETY: Primary | ICD-10-CM

## 2018-03-23 DIAGNOSIS — F41.1 GENERALIZED ANXIETY DISORDER: ICD-10-CM

## 2018-03-23 DIAGNOSIS — R47.89 WORD FINDING DIFFICULTY: ICD-10-CM

## 2018-03-23 PROCEDURE — 99215 OFFICE O/P EST HI 40 MIN: CPT | Performed by: PSYCHIATRY & NEUROLOGY

## 2018-03-23 RX ORDER — POLYETHYLENE GLYCOL 3350 17 G/17G
17 POWDER, FOR SOLUTION ORAL DAILY
COMMUNITY
End: 2019-01-21 | Stop reason: SDUPTHER

## 2018-03-23 RX ORDER — ACETAMINOPHEN 500 MG
500 TABLET ORAL EVERY 6 HOURS PRN
COMMUNITY
End: 2019-01-06

## 2018-03-23 RX ORDER — ESCITALOPRAM OXALATE 10 MG/1
10 TABLET ORAL DAILY
COMMUNITY
Start: 2018-03-21 | End: 2018-06-27 | Stop reason: SDUPTHER

## 2018-03-23 NOTE — PROGRESS NOTES
Patient is here today as a new patient for a consult for dementia    Patient ID: Suzi Mcardle is a 80 y o  female  Assessment/Plan:     Problem List Items Addressed This Visit        Nervous and Auditory    Dementia without behavioral disturbance    Relevant Medications    escitalopram (LEXAPRO) 10 mg tablet    Other Relevant Orders    Ambulatory referral to Speech Therapy       Other    Word finding difficulty    Relevant Medications    escitalopram (LEXAPRO) 10 mg tablet    Generalized anxiety disorder    Relevant Medications    escitalopram (LEXAPRO) 10 mg tablet    Anxiety - Primary           Mrs Sea Berger has Advanced senile dementia of amnestic type with no behavioral changes noted  Patient described no hallucinations, no delusional states, she is still frustrated from her abnormal behavior likely due to delirium during her recent hospitalization  I would recommend discontinuing Aricept at this point as adverse effects overcome benefits - patient has diarrhea and urinary incontinence with weight loss, likely due to the medication  CT head for November 2017 was personally reviewed - mesial temporal lobe atrophy bilaterally correlates with her age, no acute or remote ischemic or hemorrhagic changes noted  No focal neurologic deficit  Has been appreciated  Patient is to continue aspirin 81 mg and Depakote 250 mg for mood stabilizing properties  Cognitive therapy will be of great benefit - referral provided  MOCA today was 14/30 with 0/5 words recalled, visuospatial function mildly impaired along with orientation  Would not start Namenda either  Patient is to follow up on 6 months  HPI History of Present Illness  Mrs Sea Berger has a history of HLD, HTN, hypothyroidism, MI, Anxiety, Depression, BPPV, SDH, subdural hygroma, sleep disorder, who presented for evaluation of cognitive impairment     Patient is a resident of [facility], who has been recently evaluated in ER for confusional state  Patient has been describing hand weakness with weak  with no weakness in her hands  Patient believes she has arthritis which interferes her hands function  Patient stated that her memory is weak and she is very forgetful  No issues with remembering names of her children and grandkids  Patient had left frontal SDH in 2014 due fall, along with left temporal small hematoma  November 22, 2017 patient was confused with double vision and CT head showed no stroke or hemorrhage  Patient had described disinhibited behavior in November 2017 with some signs of delirium as per the patient  No new focal neurologic deficit has been described since her discharge  Bowel movements with persistent diarrhea , and wakes up due to urinary incontinence  The following portions of the patient's history were reviewed and updated as appropriate:   She  has a past medical history of Altered mental status (11/22/2017); Anxiety; Basal cell adenocarcinoma; Coronary artery disease; Dementia (2006); Depression; Diverticulosis; Essential hypertension (11/22/2017); Hyperlipidemia; Hypertension; Hypothyroid (11/22/2017); Hypothyroid; Insomnia; MI (myocardial infarction); Subdural hematoma, post-traumatic ([facility] [address] ) (2014); Subdural hygroma (2014); TIA (transient ischemic attack); Unstable angina ([facility] [address] ) (2015); and Vertigo  She   Patient Active Problem List    Diagnosis Date Noted    Dementia without behavioral disturbance 02/16/2018    Anxiety 02/16/2018    Ambulatory dysfunction 02/16/2018    Generalized anxiety disorder 11/23/2017    Altered mental status 11/22/2017    Word finding difficulty 11/22/2017    Confusion 11/22/2017    Essential hypertension 11/22/2017    Memory loss 11/22/2017    Hyperlipidemia 11/22/2017    Hypothyroid 11/22/2017     She  has a past surgical history that includes Cholecystectomy; Hysterectomy; Breast lumpectomy (Right); and Cataract extraction    Her family history includes Colon cancer in her father; Dementia in her father; Diabetes in her mother; Lung cancer in her mother; Prostate cancer in her brother  She  reports that she has never smoked  She has never used smokeless tobacco  She reports that she does not drink alcohol or use drugs  Current Outpatient Prescriptions   Medication Sig Dispense Refill    acetaminophen (TYLENOL) 500 mg tablet Take 500 mg by mouth every 6 (six) hours as needed for mild pain      ALPRAZolam (XANAX) 0 25 mg tablet Take 1 tablet by mouth      Alum & Mag Hydroxide-Simeth (ANTACID LIQUID PO) Take 30 mL by mouth as needed      aspirin 81 MG tablet Take 1 tablet by mouth once a week 3 times a week 30 tablet 0    divalproex sodium (DEPAKOTE) 250 mg EC tablet Take 1 tablet by mouth      donepezil (ARICEPT) 10 mg tablet Take 1 tablet by mouth daily 30 tablet 0    escitalopram (LEXAPRO) 10 mg tablet Take 10 mg by mouth daily        levothyroxine (LEVOXYL) 75 mcg tablet Take 1 tablet by mouth daily      metoprolol tartrate (LOPRESSOR) 25 mg tablet Take 1 tablet by mouth 2 (two) times a day 60 tablet 0    polyethylene glycol (GLYCOLAX) powder Take 17 g by mouth daily      ammonium lactate (LAC-HYDRIN) 12 % cream Apply topically Twice daily      atorvastatin (LIPITOR) 40 mg tablet Take 1 tablet by mouth every evening 30 tablet 0    escitalopram (LEXAPRO) 5 mg tablet Take 1 tablet by mouth daily 30 tablet 0     No current facility-administered medications for this visit        Current Outpatient Prescriptions on File Prior to Visit   Medication Sig    ALPRAZolam (XANAX) 0 25 mg tablet Take 1 tablet by mouth    aspirin 81 MG tablet Take 1 tablet by mouth once a week 3 times a week    divalproex sodium (DEPAKOTE) 250 mg EC tablet Take 1 tablet by mouth    donepezil (ARICEPT) 10 mg tablet Take 1 tablet by mouth daily    levothyroxine (LEVOXYL) 75 mcg tablet Take 1 tablet by mouth daily    metoprolol tartrate (LOPRESSOR) 25 mg tablet Take 1 tablet by mouth 2 (two) times a day    ammonium lactate (LAC-HYDRIN) 12 % cream Apply topically Twice daily    atorvastatin (LIPITOR) 40 mg tablet Take 1 tablet by mouth every evening    escitalopram (LEXAPRO) 5 mg tablet Take 1 tablet by mouth daily     No current facility-administered medications on file prior to visit  She is allergic to ace inhibitors; celecoxib; haldol [haloperidol]; and sulfa antibiotics            Objective:    Blood pressure 113/62, pulse 70, resp  rate 14, height 5' 3" (1 6 m), weight 51 6 kg (113 lb 12 8 oz)  Physical Exam/Neurological Exam  CONSTITUTIONAL: NAD, pleasant  NECK: supple, no lymphadenopathy, no thyromegaly, no JVD  CARDIOVASCULAR: RRR, normal S1S2, no murmurs, no rubs  RESP: clear to auscultation bilaterally, no wheezes/rhonchi/rales  ABDOMEN: soft, non tender, non distended  SKIN: no rash or skin lesions  EXTREMITIES: no edema, pulses 2+bilaterally  PSYCH: appropriate mood and affect  NEUROLOGIC COMPREHENSIVE EXAM: Patient is oriented to person, place and time, NAD; appropriate affect  CN II, III, IV, V, VI, VII,VIII,IX,X,XI-XII intact with EOMI, PERRLA, OKN intact, VF grossly intact, fundi poorly visualized secondary to pupillary constriction; symmetric face noted  Motor: 5/5 UE/LE bilateral symmetric; Sensory: intact to light touch and pinprick bilaterally; normal vibration sensation feet bilaterally; Coordination within normal limits on FTN and EDE testing; DTR: 2/4 through, no Babinski, no clonus  Tandem gait is intact  Romberg: negative  ROS:  12 points of review of system was reviewed with the patient and was unremarkable with exception: see HPI  Review of Systems   Constitutional: Negative  Negative for appetite change and fever  HENT: Negative  Negative for hearing loss, tinnitus, trouble swallowing and voice change  Eyes: Negative  Negative for photophobia and pain  Respiratory: Negative  Negative for shortness of breath      Cardiovascular: Positive for palpitations (with walking)  Gastrointestinal: Positive for constipation and diarrhea  Negative for nausea and vomiting  Endocrine: Negative  Negative for cold intolerance and heat intolerance  Genitourinary: Positive for frequency and urgency  Negative for dysuria  Musculoskeletal: Negative  Negative for myalgias and neck pain  Skin: Negative  Negative for rash  Neurological: Negative  Negative for dizziness, tremors, seizures, syncope, facial asymmetry, speech difficulty, weakness, light-headedness, numbness and headaches  Hematological: Negative  Does not bruise/bleed easily  Psychiatric/Behavioral: Positive for sleep disturbance  Negative for confusion and hallucinations  The patient is nervous/anxious

## 2018-04-18 DIAGNOSIS — R21 RASH: Primary | ICD-10-CM

## 2018-04-18 RX ORDER — CLOTRIMAZOLE AND BETAMETHASONE DIPROPIONATE 10; .5 MG/ML; MG/ML
LOTION TOPICAL
Qty: 30 ML | Refills: 4 | Status: SHIPPED | OUTPATIENT
Start: 2018-04-18 | End: 2018-10-30 | Stop reason: ALTCHOICE

## 2018-04-25 ENCOUNTER — OFFICE VISIT (OUTPATIENT)
Dept: FAMILY MEDICINE CLINIC | Facility: CLINIC | Age: 83
End: 2018-04-25
Payer: MEDICARE

## 2018-04-25 VITALS
SYSTOLIC BLOOD PRESSURE: 112 MMHG | WEIGHT: 113.2 LBS | DIASTOLIC BLOOD PRESSURE: 60 MMHG | HEIGHT: 63 IN | BODY MASS INDEX: 20.06 KG/M2

## 2018-04-25 DIAGNOSIS — F03.90 DEMENTIA WITHOUT BEHAVIORAL DISTURBANCE, UNSPECIFIED DEMENTIA TYPE (HCC): ICD-10-CM

## 2018-04-25 DIAGNOSIS — R26.2 AMBULATORY DYSFUNCTION: ICD-10-CM

## 2018-04-25 DIAGNOSIS — I10 BENIGN ESSENTIAL HYPERTENSION: Primary | Chronic | ICD-10-CM

## 2018-04-25 DIAGNOSIS — E03.9 ACQUIRED HYPOTHYROIDISM: Chronic | ICD-10-CM

## 2018-04-25 DIAGNOSIS — E78.00 PURE HYPERCHOLESTEROLEMIA: Chronic | ICD-10-CM

## 2018-04-25 PROBLEM — G31.84 MILD COGNITIVE IMPAIRMENT: Status: ACTIVE | Noted: 2017-11-28

## 2018-04-25 PROBLEM — R41.82 ALTERED MENTAL STATUS: Status: RESOLVED | Noted: 2017-11-22 | Resolved: 2018-04-25

## 2018-04-25 PROCEDURE — 99214 OFFICE O/P EST MOD 30 MIN: CPT | Performed by: FAMILY MEDICINE

## 2018-04-25 NOTE — PROGRESS NOTES
Assessment/Plan:    Patient's blood pressure stable  Will update the patient's thyroid level and lipid profile with her next office visit  Patient continues to walk with a cane and public and uses a walker when in private  Has not had any falls  Patient's dementia is stable at this point  Continue all recommended medications and recheck in October with a flu shot and labs  Diagnoses and all orders for this visit:    Benign essential hypertension    Acquired hypothyroidism    Pure hypercholesterolemia    Ambulatory dysfunction    Dementia without behavioral disturbance, unspecified dementia type        There are no Patient Instructions on file for this visit  Subjective:        Patient ID: Julio Mendoza is a 80 y o  female  Chief Complaint   Patient presents with    Follow-up     dementia       Patient here for recheck of blood pressure and overall health  States she is doing fine  Has issues with chronic anxiety  Was supposed to be here for Medicare wellness but forgot her paperwork  The following portions of the patient's history were reviewed and updated as appropriate: past medical history, past surgical history and problem list       Review of Systems   Constitutional: Negative for appetite change, fatigue, fever and unexpected weight change  HENT: Negative for congestion, ear pain, postnasal drip, rhinorrhea, sinus pain, sinus pressure and sore throat  Eyes: Negative for redness and visual disturbance  Respiratory: Negative for chest tightness and shortness of breath  Cardiovascular: Negative for chest pain, palpitations and leg swelling  Gastrointestinal: Negative for abdominal distention, abdominal pain, diarrhea and nausea  Endocrine: Negative for cold intolerance and heat intolerance  Genitourinary: Negative for dysuria and hematuria  Musculoskeletal: Positive for gait problem  Negative for arthralgias and myalgias  Skin: Negative for pallor and rash  Neurological: Negative for dizziness, weakness and headaches  Psychiatric/Behavioral: Negative for behavioral problems  The patient is nervous/anxious  Objective:  /60 (BP Location: Left arm, Patient Position: Sitting, Cuff Size: Standard)   Ht 5' 3" (1 6 m)   Wt 51 3 kg (113 lb 3 2 oz)   BMI 20 05 kg/m²        Physical Exam   Constitutional: She is oriented to person, place, and time  She appears well-nourished  No distress  HENT:   Head: Normocephalic and atraumatic  Right Ear: External ear normal    Left Ear: External ear normal    Mouth/Throat: Oropharynx is clear and moist    Eyes: Conjunctivae and EOM are normal  Pupils are equal, round, and reactive to light  No scleral icterus  Neck: Normal range of motion  Neck supple  No thyromegaly present  Cardiovascular: Normal rate, regular rhythm and intact distal pulses  No murmur heard  Pulmonary/Chest: Effort normal and breath sounds normal  She has no wheezes  Abdominal: Soft  Bowel sounds are normal  She exhibits no distension  There is no tenderness  Musculoskeletal: Normal range of motion  She exhibits no edema  Lymphadenopathy:     She has no cervical adenopathy  Neurological: She is alert and oriented to person, place, and time  Patient very talkative  Sometimes loses her train of thought  Skin: Skin is warm  No pallor  Psychiatric: She has a normal mood and affect   Her behavior is normal  Thought content normal

## 2018-05-30 DIAGNOSIS — F03.90 DEMENTIA WITHOUT BEHAVIORAL DISTURBANCE, UNSPECIFIED DEMENTIA TYPE (HCC): Primary | ICD-10-CM

## 2018-05-30 RX ORDER — DIVALPROEX SODIUM 250 MG/1
TABLET, DELAYED RELEASE ORAL
Qty: 30 TABLET | Refills: 4 | Status: SHIPPED | OUTPATIENT
Start: 2018-05-30 | End: 2018-10-23 | Stop reason: SDUPTHER

## 2018-05-30 RX ORDER — DONEPEZIL HYDROCHLORIDE 10 MG/1
TABLET, FILM COATED ORAL
Qty: 30 TABLET | Refills: 4 | Status: SHIPPED | OUTPATIENT
Start: 2018-05-30 | End: 2018-10-23 | Stop reason: SDUPTHER

## 2018-06-27 DIAGNOSIS — I25.10 CORONARY ARTERY DISEASE INVOLVING NATIVE CORONARY ARTERY OF NATIVE HEART WITHOUT ANGINA PECTORIS: ICD-10-CM

## 2018-06-27 DIAGNOSIS — F32.89 OTHER DEPRESSION: Primary | ICD-10-CM

## 2018-06-27 RX ORDER — ASPIRIN 81 MG/1
TABLET, CHEWABLE ORAL
Qty: 13 TABLET | Refills: 4 | Status: SHIPPED | OUTPATIENT
Start: 2018-06-27

## 2018-06-27 RX ORDER — ESCITALOPRAM OXALATE 10 MG/1
TABLET ORAL
Qty: 30 TABLET | Refills: 4 | Status: SHIPPED | OUTPATIENT
Start: 2018-06-27 | End: 2019-02-10 | Stop reason: ALTCHOICE

## 2018-07-25 DIAGNOSIS — E03.9 ACQUIRED HYPOTHYROIDISM: Primary | ICD-10-CM

## 2018-07-25 RX ORDER — LEVOTHYROXINE SODIUM 0.07 MG/1
TABLET ORAL
Qty: 30 TABLET | Refills: 4 | Status: SHIPPED | OUTPATIENT
Start: 2018-07-25 | End: 2019-04-12 | Stop reason: SDUPTHER

## 2018-07-31 ENCOUNTER — TELEPHONE (OUTPATIENT)
Dept: FAMILY MEDICINE CLINIC | Facility: CLINIC | Age: 83
End: 2018-07-31

## 2018-07-31 NOTE — TELEPHONE ENCOUNTER
Pt c/o not ordinary bowel movements - they go all over, comes suddenly happening every single time for the last 3 weeks  Anxiety when involved in an uncontrolled episode of screaming and kicking/moved out of residence - she was never told why and it keeps her up at night wondering why  She would like some relief for bowel movements

## 2018-07-31 NOTE — TELEPHONE ENCOUNTER
Tell her this could very well be irritable bowel with diarrhea  Being nervous and anxious can absolutely make it worse  We can try 1 colestipol q day and titrated up if needed    She should update us in a week or 2 to see if it is working

## 2018-08-01 NOTE — TELEPHONE ENCOUNTER
Pt returned call - she believes this is really serious, she has not been able to have any solid foods and there is such a large content of diarrhea it is not firm at all - this is the first time this has ever happened to her; asked her if she would be willing to try the medication but believes her sxs are more serious than that  Please advise

## 2018-08-01 NOTE — TELEPHONE ENCOUNTER
If she feels very ill are having abdominal pain she might need to consider going to the emergency room otherwise maybe she can get an appointment tomorrow jesse Centeno

## 2018-08-03 ENCOUNTER — OFFICE VISIT (OUTPATIENT)
Dept: FAMILY MEDICINE CLINIC | Facility: CLINIC | Age: 83
End: 2018-08-03
Payer: MEDICARE

## 2018-08-03 VITALS
HEIGHT: 63 IN | DIASTOLIC BLOOD PRESSURE: 70 MMHG | WEIGHT: 102.6 LBS | BODY MASS INDEX: 18.18 KG/M2 | SYSTOLIC BLOOD PRESSURE: 128 MMHG

## 2018-08-03 DIAGNOSIS — I10 BENIGN ESSENTIAL HYPERTENSION: Chronic | ICD-10-CM

## 2018-08-03 DIAGNOSIS — R19.7 DIARRHEA, UNSPECIFIED TYPE: Primary | ICD-10-CM

## 2018-08-03 DIAGNOSIS — F03.90 DEMENTIA WITHOUT BEHAVIORAL DISTURBANCE, UNSPECIFIED DEMENTIA TYPE (HCC): ICD-10-CM

## 2018-08-03 DIAGNOSIS — E03.9 ACQUIRED HYPOTHYROIDISM: Chronic | ICD-10-CM

## 2018-08-03 DIAGNOSIS — R47.89 WORD FINDING DIFFICULTY: ICD-10-CM

## 2018-08-03 DIAGNOSIS — F41.1 GENERALIZED ANXIETY DISORDER: ICD-10-CM

## 2018-08-03 PROCEDURE — 99214 OFFICE O/P EST MOD 30 MIN: CPT | Performed by: FAMILY MEDICINE

## 2018-08-03 RX ORDER — CHOLESTYRAMINE LIGHT 4 G/5.7G
POWDER, FOR SUSPENSION ORAL
Qty: 30 PACKET | Refills: 0 | Status: SHIPPED | OUTPATIENT
Start: 2018-08-03 | End: 2018-08-07 | Stop reason: SDUPTHER

## 2018-08-06 ENCOUNTER — TELEPHONE (OUTPATIENT)
Dept: FAMILY MEDICINE CLINIC | Facility: CLINIC | Age: 83
End: 2018-08-06

## 2018-08-06 DIAGNOSIS — R19.7 DIARRHEA, UNSPECIFIED TYPE: Primary | ICD-10-CM

## 2018-08-06 PROBLEM — F41.9 ANXIETY: Status: RESOLVED | Noted: 2018-02-16 | Resolved: 2018-08-06

## 2018-08-06 RX ORDER — LOPERAMIDE HYDROCHLORIDE 2 MG/1
2 CAPSULE ORAL 4 TIMES DAILY PRN
Qty: 30 CAPSULE | Refills: 0 | Status: SHIPPED | OUTPATIENT
Start: 2018-08-06 | End: 2018-11-29

## 2018-08-06 NOTE — PROGRESS NOTES
Assessment/Plan:    No problem-specific Assessment & Plan notes found for this encounter  Patient here nurse leave for diarrhea for the last 2-3 weeks  Recommend stool cultures to be done  Prescription for Questran sent to pharmacy  Patient continues to go on about hospitalization at took place last November and how upset she was that she ended up in the hospital and the poor care that she received there  She states nobody would tell her what was wrong with her into she read in a magazine  I am concerned that her dementia is advancing  Family is aware of this  Labs reasonably up-to-date  Will await stool findings to see and make sure she does not have C diff  If negative this could possibly be irritable bowel due to anxiety causing her diarrhea     Diagnoses and all orders for this visit:    Diarrhea, unspecified type  -     cholestyramine sugar free (QUESTRAN LIGHT) 4 g packet; Use one pack as directed daily for diarrhea  Dementia without behavioral disturbance, unspecified dementia type    Generalized anxiety disorder    Benign essential hypertension    Acquired hypothyroidism    Word finding difficulty        There are no Patient Instructions on file for this visit  Subjective:        Patient ID: Jerrell Zhong is a 80 y o  female  Chief Complaint   Patient presents with    Diarrhea     x weeks; "splatters" no warning     Altered Mental Status     pt reports hearing a voice/illusions/dementia - went to hospital       Patient here for urgent appointment regarding diarrhea  When I walk into the room she starts going on on about an old hospitalization back in November  She continues to have word-finding difficulty  When asked about diarrhea she keeps going back to the hospitalization back in November    She denies any fever chills        The following portions of the patient's history were reviewed and updated as appropriate: past medical history, past surgical history and problem list       Review of Systems   Constitutional: Negative for appetite change, fatigue, fever and unexpected weight change  HENT: Negative for congestion, ear pain, postnasal drip, rhinorrhea, sinus pain, sinus pressure and sore throat  Eyes: Negative for redness and visual disturbance  Respiratory: Negative for chest tightness and shortness of breath  Cardiovascular: Negative for chest pain, palpitations and leg swelling  Gastrointestinal: Positive for diarrhea  Negative for abdominal distention, abdominal pain and nausea  Endocrine: Negative for cold intolerance and heat intolerance  Genitourinary: Negative for dysuria and hematuria  Musculoskeletal: Negative for arthralgias, gait problem and myalgias  Skin: Negative for pallor and rash  Neurological: Positive for speech difficulty  Negative for dizziness and headaches  Psychiatric/Behavioral: Negative for behavioral problems  The patient is nervous/anxious  Objective:  /70 (BP Location: Left arm, Patient Position: Sitting, Cuff Size: Standard)   Ht 5' 3" (1 6 m)   Wt 46 5 kg (102 lb 9 6 oz)   BMI 18 17 kg/m²        Physical Exam   Constitutional: She is oriented to person, place, and time  She appears well-developed and well-nourished  No distress  HENT:   Head: Normocephalic and atraumatic  Right Ear: External ear normal    Left Ear: External ear normal    Mouth/Throat: Oropharynx is clear and moist    Eyes: Conjunctivae and EOM are normal  Pupils are equal, round, and reactive to light  No scleral icterus  Neck: Normal range of motion  Neck supple  No thyromegaly present  Cardiovascular: Normal rate, regular rhythm and intact distal pulses  No murmur heard  Pulmonary/Chest: Effort normal and breath sounds normal  She has no wheezes  Abdominal: Soft  Bowel sounds are normal  She exhibits no distension  There is no tenderness  Musculoskeletal: Normal range of motion  She exhibits no edema     Lymphadenopathy: She has no cervical adenopathy  Neurological: She is alert and oriented to person, place, and time  Skin: Skin is warm  No pallor     Psychiatric: Her behavior is normal  Thought content normal    Anxious

## 2018-08-06 NOTE — TELEPHONE ENCOUNTER
As long as this C diff was negative she continues to use the Questran daily and can use Imodium twice a day as needed    If you need to pend immodium   twice a day as needed for diarrhea please do

## 2018-08-06 NOTE — TELEPHONE ENCOUNTER
Mio Leung called with an update re pt  She started cholestyramine on Saturday but is still c/o diarrhea  C diff negative but still has sxs  He would like to know if it will take time for the cholestyramine to take effect or if they could use lomotil or imodium as well  Please advise  Any rx would need to go to [facility]

## 2018-08-07 DIAGNOSIS — R19.7 DIARRHEA, UNSPECIFIED TYPE: ICD-10-CM

## 2018-08-07 RX ORDER — CHOLESTYRAMINE LIGHT 4 G/5.7G
POWDER, FOR SUSPENSION ORAL
Qty: 1 PACKET | Refills: 4 | Status: SHIPPED | OUTPATIENT
Start: 2018-08-07 | End: 2018-10-30 | Stop reason: ALTCHOICE

## 2018-09-07 ENCOUNTER — TELEPHONE (OUTPATIENT)
Dept: FAMILY MEDICINE CLINIC | Facility: CLINIC | Age: 83
End: 2018-09-07

## 2018-09-07 ENCOUNTER — TRANSCRIBE ORDERS (OUTPATIENT)
Dept: FAMILY MEDICINE CLINIC | Facility: CLINIC | Age: 83
End: 2018-09-07

## 2018-09-07 DIAGNOSIS — R53.83 LETHARGIC: Primary | ICD-10-CM

## 2018-09-07 NOTE — TELEPHONE ENCOUNTER
[facility] called and stated pt is having increased anxiety in the mornings as well as lethargic and fatigue  Asking if they can get a script for an UA

## 2018-09-27 DIAGNOSIS — I10 ESSENTIAL HYPERTENSION: Primary | ICD-10-CM

## 2018-09-28 ENCOUNTER — OFFICE VISIT (OUTPATIENT)
Dept: NEUROLOGY | Facility: CLINIC | Age: 83
End: 2018-09-28
Payer: MEDICARE

## 2018-09-28 VITALS
HEIGHT: 63 IN | BODY MASS INDEX: 18.07 KG/M2 | DIASTOLIC BLOOD PRESSURE: 74 MMHG | WEIGHT: 102 LBS | SYSTOLIC BLOOD PRESSURE: 182 MMHG | HEART RATE: 61 BPM

## 2018-09-28 DIAGNOSIS — R26.2 AMBULATORY DYSFUNCTION: ICD-10-CM

## 2018-09-28 DIAGNOSIS — F03.90 DEMENTIA WITHOUT BEHAVIORAL DISTURBANCE, UNSPECIFIED DEMENTIA TYPE (HCC): Primary | ICD-10-CM

## 2018-09-28 PROCEDURE — 99213 OFFICE O/P EST LOW 20 MIN: CPT | Performed by: PSYCHIATRY & NEUROLOGY

## 2018-09-28 NOTE — PROGRESS NOTES
Patient ID: Ronald Castaneda is a 80 y o  female  Assessment/Plan:     Problem List Items Addressed This Visit        Nervous and Auditory    Dementia without behavioral disturbance - Primary       Other    Ambulatory dysfunction         Mrs Deyanira Torers has presented for follow up on advanced senile dementia  Patient has been on Aricept 10 mg HS  [facility] scored 16/30 with 0/5 words recalled ( prior was 14/30)  Patient has no issues with executive dysfunction, great insight in her health noted  Patient continues having diarrhea- one of the side effect of Aricept is diarrhea, may discontinue the medication; No tremors, no falls, patient has no falls in 2018, she has been using a walker for safety purposes  Last imaging completed in November 2017 with no signs of ischemic or hemorrhagic changes, no new focal neurologic deficit appreciated on her today's exam      Follow up with Leena Alan in 6 months  Subjective: cognitive dysfunction  HPI/History of Present Illness  Mrs Deyanira Torres has a history of HLD, HTN, hypothyroidism, MI, Anxiety, Depression, BPPV, SDH 2014, subdural hygroma, sleep disorder, who follow up on  cognitive impairment  Patient is a resident of [facility], she had recent BD celebrated  Patient described no new focal weakness or numbness, she has been ambulating with a walker  Patient has been doing great in her facility - she has no worsening in her cognitive function, no falls, no hospitalization since March 2018  No signs of mood related issues, no parkinsonism noted  The following portions of the patient's history were reviewed and updated as appropriate:   She  has a past medical history of Abnormal CT scan, neck; Altered mental status (11/22/2017); Anxiety; Basal cell adenocarcinoma; Benign essential hypertension; Coronary artery disease; Dementia (2006); Depression; Diverticulosis; Essential hypertension (11/22/2017); Hyperlipidemia; Hypertension;  Hypothyroid (11/22/2017); Hypothyroid; Insomnia; Long term (current) use of antithrombotics/antiplatelets; 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 ([facility] ); MI (myocardial infarction) ([facility] ); Subdural hematoma, post-traumatic ([facility] ) (2014); Subdural hygroma (2014); TIA (transient ischemic attack); Unstable angina ([facility] ) (2015); and Vertigo  She   Patient Active Problem List    Diagnosis Date Noted    Dementia without behavioral disturbance 02/16/2018    Ambulatory dysfunction 02/16/2018    Mild cognitive impairment 11/28/2017    Generalized anxiety disorder 11/23/2017    Word finding difficulty 11/22/2017    Confusion 11/22/2017    Benign essential hypertension 11/22/2017    Memory loss 11/22/2017    Hyperlipidemia 11/22/2017    Hypothyroidism 11/22/2017    Coronary artery disease 04/24/2015    Vitamin D deficiency 03/11/2014    Xerostomia 03/11/2014     She  has a past surgical history that includes Cholecystectomy; Hysterectomy; Breast lumpectomy (Right); Cataract extraction; and Colonoscopy (2003)  Her family history includes Colon cancer in her father; Dementia in her father; Diabetes in her mother; Lung cancer in her mother; Prostate cancer in her brother  She  reports that she has never smoked  She has never used smokeless tobacco  She reports that she does not drink alcohol or use drugs    Current Outpatient Prescriptions   Medication Sig Dispense Refill    acetaminophen (TYLENOL) 500 mg tablet Take 500 mg by mouth every 6 (six) hours as needed for mild pain      ALPRAZolam (XANAX) 0 25 mg tablet Take 1 tablet by mouth      aspirin 81 mg chewable tablet CHEW 1 TABLET AND SWALLOW ORALLY DAILY ON MONDAY, WEDNESDAY & FRIDAY (HEART) 13 tablet 4    divalproex sodium (DEPAKOTE) 250 mg EC tablet TAKE 1 TABLET ORALLY AT BEDTIME (DO NOT CRUSH) (ALTERED MENTAL STATUS/ANXIETY DISORDER) 30 tablet 4    donepezil (ARICEPT) 10 mg tablet TAKE 1 TABLET ORALLY DAILY (MEMORY LOSS) 30 tablet 4    escitalopram (LEXAPRO) 10 mg tablet TAKE 1 TABLET ORALLY DAILY (ANXIETY DISORDER) 30 tablet 4    levothyroxine 75 mcg tablet TAKE 1 TABLET ORALLY DAILY (THYROID) 30 tablet 4    loperamide (IMODIUM) 2 mg capsule Take 1 capsule (2 mg total) by mouth 4 (four) times a day as needed for diarrhea 30 capsule 0    metoprolol tartrate (LOPRESSOR) 25 mg tablet TAKE 1 TABLET ORALLY TWICE DAILY ( BLOOD PRESSURE ) 60 tablet 4    polyethylene glycol (GLYCOLAX) powder Take 17 g by mouth daily      Alum & Mag Hydroxide-Simeth (ANTACID LIQUID PO) Take 30 mL by mouth as needed      ammonium lactate (LAC-HYDRIN) 12 % cream Apply topically Twice daily      atorvastatin (LIPITOR) 40 mg tablet Take 1 tablet by mouth every evening (Patient not taking: Reported on 9/28/2018 ) 30 tablet 0    cholestyramine sugar free (QUESTRAN LIGHT) 4 g packet DISSOLVE ONE PACKET IN LIQUID AND DRINK ORALLY DAILY (DIARRHEA) *RE-ORDER* 1 packet 4    clotrimazole-betamethasone (LOTRISONE) 1-0 05 % lotion APPLY TOPICALLY TO EXCORIATION BETWEEN BUTTOCK AREA TWICE DAILY AS NEEDED FOR RASH *RE-ORDER* 30 mL 4     No current facility-administered medications for this visit        Current Outpatient Prescriptions on File Prior to Visit   Medication Sig    acetaminophen (TYLENOL) 500 mg tablet Take 500 mg by mouth every 6 (six) hours as needed for mild pain    ALPRAZolam (XANAX) 0 25 mg tablet Take 1 tablet by mouth    aspirin 81 mg chewable tablet CHEW 1 TABLET AND SWALLOW ORALLY DAILY ON MONDAY, WEDNESDAY & FRIDAY (HEART)    divalproex sodium (DEPAKOTE) 250 mg EC tablet TAKE 1 TABLET ORALLY AT BEDTIME (DO NOT CRUSH) (ALTERED MENTAL STATUS/ANXIETY DISORDER)    donepezil (ARICEPT) 10 mg tablet TAKE 1 TABLET ORALLY DAILY (MEMORY LOSS)    escitalopram (LEXAPRO) 10 mg tablet TAKE 1 TABLET ORALLY DAILY (ANXIETY DISORDER)    levothyroxine 75 mcg tablet TAKE 1 TABLET ORALLY DAILY (THYROID)    loperamide (IMODIUM) 2 mg capsule Take 1 capsule (2 mg total) by mouth 4 (four) times a day as needed for diarrhea    metoprolol tartrate (LOPRESSOR) 25 mg tablet TAKE 1 TABLET ORALLY TWICE DAILY ( BLOOD PRESSURE )    polyethylene glycol (GLYCOLAX) powder Take 17 g by mouth daily    Alum & Mag Hydroxide-Simeth (ANTACID LIQUID PO) Take 30 mL by mouth as needed    ammonium lactate (LAC-HYDRIN) 12 % cream Apply topically Twice daily    atorvastatin (LIPITOR) 40 mg tablet Take 1 tablet by mouth every evening (Patient not taking: Reported on 9/28/2018 )    cholestyramine sugar free (QUESTRAN LIGHT) 4 g packet DISSOLVE ONE PACKET IN LIQUID AND DRINK ORALLY DAILY (DIARRHEA) *RE-ORDER*    clotrimazole-betamethasone (LOTRISONE) 1-0 05 % lotion APPLY TOPICALLY TO EXCORIATION BETWEEN BUTTOCK AREA TWICE DAILY AS NEEDED FOR RASH *RE-ORDER*    [DISCONTINUED] aspirin 81 MG tablet Take 1 tablet by mouth once a week 3 times a week     No current facility-administered medications on file prior to visit  She is allergic to ace inhibitors; celecoxib; haldol [haloperidol]; and sulfa antibiotics            Objective:    Blood pressure (!) 182/74, pulse 61, height 5' 3" (1 6 m), weight 46 3 kg (102 lb)  Physical Exam/Neurological Exam  CONSTITUTIONAL: NAD, pleasant  NECK: supple, no lymphadenopathy, no thyromegaly, no JVD  CARDIOVASCULAR: RRR, normal S1S2, no murmurs, no rubs  RESP: clear to auscultation bilaterally, no wheezes/rhonchi/rales  ABDOMEN: soft, non tender, non distended  SKIN: no rash or skin lesions  EXTREMITIES: no edema, pulses 2+bilaterally  PSYCH: appropriate mood and affect  NEUROLOGIC COMPREHENSIVE EXAM: Patient is oriented to person, place and time, NAD; appropriate affect  CN II, III, IV, V, VI, VII,VIII,IX,X,XI-XII intact with EOMI, PERRLA, OKN intact, VF grossly intact, fundi poorly visualized secondary to pupillary constriction; symmetric face noted   Motor: 5/5 UE/LE bilateral symmetric; Sensory: intact to light touch and pinprick bilaterally; normal vibration sensation feet bilaterally; Coordination within normal limits on FTN and EDE testing; DTR: 2/4 through, no Babinski, no clonus  Tandem gait is abnormal  Romberg: negative  Age related muscle wasting noted  ROS:  12 points of review of system was reviewed with the patient and was unremarkable with exception: see HPI  Review of Systems   Constitutional: Positive for fatigue  Negative for appetite change and fever  HENT: Negative  Negative for hearing loss, tinnitus, trouble swallowing and voice change  Eyes: Negative  Negative for photophobia and pain  Respiratory: Negative  Negative for shortness of breath  Cardiovascular: Negative  Negative for palpitations  Gastrointestinal: Negative  Negative for nausea  Endocrine: Negative  Negative for cold intolerance and heat intolerance  Genitourinary: Negative  Negative for dysuria, frequency and urgency  Musculoskeletal: Negative  Negative for myalgias and neck pain  Skin: Negative  Allergic/Immunologic: Negative  Neurological: Negative  Negative for dizziness, tremors, seizures, syncope, facial asymmetry, speech difficulty, weakness and numbness  Hematological: Negative  Does not bruise/bleed easily  Psychiatric/Behavioral: Negative for confusion and hallucinations  The patient is nervous/anxious

## 2018-10-01 ENCOUNTER — TELEPHONE (OUTPATIENT)
Dept: NEUROLOGY | Facility: CLINIC | Age: 83
End: 2018-10-01

## 2018-10-01 NOTE — TELEPHONE ENCOUNTER
Jessica Alva from [facility] called stated that she is faxing over an updated med list because what she was sent home with is out of date  Asking for you to please update her chart

## 2018-10-17 ENCOUNTER — APPOINTMENT (OUTPATIENT)
Dept: LAB | Facility: CLINIC | Age: 83
End: 2018-10-17
Payer: MEDICARE

## 2018-10-17 ENCOUNTER — OFFICE VISIT (OUTPATIENT)
Dept: FAMILY MEDICINE CLINIC | Facility: CLINIC | Age: 83
End: 2018-10-17
Payer: MEDICARE

## 2018-10-17 VITALS
BODY MASS INDEX: 19.98 KG/M2 | DIASTOLIC BLOOD PRESSURE: 78 MMHG | SYSTOLIC BLOOD PRESSURE: 120 MMHG | HEIGHT: 61 IN | WEIGHT: 105.8 LBS

## 2018-10-17 DIAGNOSIS — I10 BENIGN ESSENTIAL HYPERTENSION: Primary | Chronic | ICD-10-CM

## 2018-10-17 DIAGNOSIS — I10 BENIGN ESSENTIAL HYPERTENSION: Chronic | ICD-10-CM

## 2018-10-17 DIAGNOSIS — I25.10 CORONARY ARTERY DISEASE INVOLVING NATIVE CORONARY ARTERY OF NATIVE HEART WITHOUT ANGINA PECTORIS: ICD-10-CM

## 2018-10-17 DIAGNOSIS — F03.90 DEMENTIA WITHOUT BEHAVIORAL DISTURBANCE, UNSPECIFIED DEMENTIA TYPE (HCC): ICD-10-CM

## 2018-10-17 DIAGNOSIS — E03.9 ACQUIRED HYPOTHYROIDISM: Chronic | ICD-10-CM

## 2018-10-17 LAB
ALBUMIN SERPL BCP-MCNC: 3.3 G/DL (ref 3.5–5)
ALP SERPL-CCNC: 54 U/L (ref 46–116)
ALT SERPL W P-5'-P-CCNC: 15 U/L (ref 12–78)
ANION GAP SERPL CALCULATED.3IONS-SCNC: 4 MMOL/L (ref 4–13)
AST SERPL W P-5'-P-CCNC: 16 U/L (ref 5–45)
BILIRUB SERPL-MCNC: 0.44 MG/DL (ref 0.2–1)
BUN SERPL-MCNC: 16 MG/DL (ref 5–25)
CALCIUM SERPL-MCNC: 8.9 MG/DL (ref 8.3–10.1)
CHLORIDE SERPL-SCNC: 104 MMOL/L (ref 100–108)
CO2 SERPL-SCNC: 33 MMOL/L (ref 21–32)
CREAT SERPL-MCNC: 0.65 MG/DL (ref 0.6–1.3)
GFR SERPL CREATININE-BSD FRML MDRD: 76 ML/MIN/1.73SQ M
GLUCOSE SERPL-MCNC: 84 MG/DL (ref 65–140)
POTASSIUM SERPL-SCNC: 3.4 MMOL/L (ref 3.5–5.3)
PROT SERPL-MCNC: 7 G/DL (ref 6.4–8.2)
SODIUM SERPL-SCNC: 141 MMOL/L (ref 136–145)
TSH SERPL DL<=0.05 MIU/L-ACNC: 0.42 UIU/ML (ref 0.36–3.74)

## 2018-10-17 PROCEDURE — 99214 OFFICE O/P EST MOD 30 MIN: CPT | Performed by: FAMILY MEDICINE

## 2018-10-17 PROCEDURE — 80053 COMPREHEN METABOLIC PANEL: CPT

## 2018-10-17 PROCEDURE — 36415 COLL VENOUS BLD VENIPUNCTURE: CPT

## 2018-10-17 PROCEDURE — 84443 ASSAY THYROID STIM HORMONE: CPT

## 2018-10-17 NOTE — PROGRESS NOTES
Assessment/Plan:  Patient's blood pressure stable today  She is asymptomatic from a coronary standpoint  Dementia is about stable  Continue present Aricept  Can consider Namenda down the line  Thyroid and CMP will be updated today through lab work  Flu shot up-to-date  Recheck 4 months  Diagnoses and all orders for this visit:    Benign essential hypertension  -     Comprehensive metabolic panel; Future    Coronary artery disease involving native coronary artery of native heart without angina pectoris    Dementia without behavioral disturbance, unspecified dementia type    Acquired hypothyroidism  -     TSH, 3rd generation; Future        1  Benign essential hypertension  Comprehensive metabolic panel   2  Coronary artery disease involving native coronary artery of native heart without angina pectoris     3  Dementia without behavioral disturbance, unspecified dementia type     4  Acquired hypothyroidism  TSH, 3rd generation       Subjective:        Patient ID: Lizette Muse is a 80 y o  female  Chief Complaint   Patient presents with    Follow-up     6 month, go over medication discuss bowel movement       Patient here for routine recheck  Due for lab work  No new issues although her assisted living location sent as forms yesterday stating she was refusing certain medication  Patient states sometimes when she wakes at night she does not feel well the next day so she skips her medications  The following portions of the patient's history were reviewed and updated as appropriate: past medical history, past surgical history and problem list       Review of Systems   Constitutional: Negative for appetite change, fatigue, fever and unexpected weight change  HENT: Negative for congestion, ear pain, postnasal drip, rhinorrhea, sinus pain, sinus pressure and sore throat  Eyes: Negative for redness and visual disturbance  Respiratory: Negative for chest tightness and shortness of breath  Cardiovascular: Negative for chest pain, palpitations and leg swelling  Gastrointestinal: Negative for abdominal distention, abdominal pain, diarrhea and nausea  Endocrine: Negative for cold intolerance and heat intolerance  Genitourinary: Negative for dysuria and hematuria  Musculoskeletal: Positive for gait problem  Negative for arthralgias and myalgias  Skin: Negative for pallor and rash  Neurological: Negative for dizziness and headaches  Memory issues stable   Psychiatric/Behavioral: Negative for behavioral problems  The patient is not nervous/anxious  Objective:  /78 (BP Location: Left arm, Patient Position: Sitting, Cuff Size: Standard)   Ht 5' 0 5" (1 537 m)   Wt 48 kg (105 lb 12 8 oz)   BMI 20 32 kg/m²        Physical Exam   Constitutional: She appears well-nourished  No distress  HENT:   Head: Normocephalic and atraumatic  Right Ear: Tympanic membrane and external ear normal    Left Ear: Tympanic membrane and external ear normal    Nose: Nose normal    Mouth/Throat: Oropharynx is clear and moist    Eyes: Pupils are equal, round, and reactive to light  Conjunctivae and EOM are normal  No scleral icterus  Neck: Normal range of motion  Neck supple  No thyromegaly present  Cardiovascular: Normal rate, regular rhythm and intact distal pulses  No murmur heard  Pulmonary/Chest: Effort normal and breath sounds normal  She has no wheezes  Abdominal: Soft  She exhibits no distension and no mass  There is no tenderness  Musculoskeletal: She exhibits no edema  Walks with walker   Lymphadenopathy:     She has no cervical adenopathy  Neurological: She is alert  She has normal reflexes  No cranial nerve deficit  Coordination normal    Skin: Skin is warm  No pallor  Psychiatric: She has a normal mood and affect  Nursing note and vitals reviewed

## 2018-10-23 ENCOUNTER — TELEPHONE (OUTPATIENT)
Dept: NEUROLOGY | Facility: CLINIC | Age: 83
End: 2018-10-23

## 2018-10-23 DIAGNOSIS — F03.90 DEMENTIA WITHOUT BEHAVIORAL DISTURBANCE, UNSPECIFIED DEMENTIA TYPE (HCC): ICD-10-CM

## 2018-10-23 RX ORDER — DIVALPROEX SODIUM 250 MG/1
TABLET, DELAYED RELEASE ORAL
Qty: 30 TABLET | Refills: 4 | Status: SHIPPED | OUTPATIENT
Start: 2018-10-23 | End: 2019-05-03 | Stop reason: SDUPTHER

## 2018-10-23 RX ORDER — DONEPEZIL HYDROCHLORIDE 10 MG/1
TABLET, FILM COATED ORAL
Qty: 30 TABLET | Refills: 4 | Status: SHIPPED | OUTPATIENT
Start: 2018-10-23 | End: 2019-02-10 | Stop reason: ALTCHOICE

## 2018-10-30 ENCOUNTER — APPOINTMENT (EMERGENCY)
Dept: CT IMAGING | Facility: HOSPITAL | Age: 83
DRG: 481 | End: 2018-10-30
Payer: MEDICARE

## 2018-10-30 ENCOUNTER — HOSPITAL ENCOUNTER (INPATIENT)
Facility: HOSPITAL | Age: 83
LOS: 7 days | Discharge: NURSING, REHAB OR LONG-TERM CARE | DRG: 481 | End: 2018-11-06
Attending: EMERGENCY MEDICINE | Admitting: FAMILY MEDICINE
Payer: MEDICARE

## 2018-10-30 ENCOUNTER — APPOINTMENT (INPATIENT)
Dept: RADIOLOGY | Facility: HOSPITAL | Age: 83
DRG: 481 | End: 2018-10-30
Payer: MEDICARE

## 2018-10-30 ENCOUNTER — APPOINTMENT (EMERGENCY)
Dept: RADIOLOGY | Facility: HOSPITAL | Age: 83
DRG: 481 | End: 2018-10-30
Payer: MEDICARE

## 2018-10-30 DIAGNOSIS — S72.141A CLOSED 2-PART INTERTROCHANTERIC FRACTURE OF RIGHT FEMUR, INITIAL ENCOUNTER (HCC): ICD-10-CM

## 2018-10-30 DIAGNOSIS — S72.90XA FEMUR FRACTURE (HCC): Primary | ICD-10-CM
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
## 2018-10-30 PROBLEM — Z86.79 HISTORY OF SUBDURAL HEMORRHAGE: Status: ACTIVE | Noted: 2018-10-30

## 2018-10-30 PROBLEM — M89.9 LYTIC BONE LESION OF RIGHT FEMUR: Status: ACTIVE | Noted: 2018-10-30

## 2018-10-30 PROBLEM — I25.2 HX OF NON-ST ELEVATION MYOCARDIAL INFARCTION (NSTEMI): Status: ACTIVE | Noted: 2018-10-30

## 2018-10-30 LAB
ALBUMIN SERPL BCP-MCNC: 3.5 G/DL (ref 3.5–5)
ALP SERPL-CCNC: 57 U/L (ref 46–116)
ALT SERPL W P-5'-P-CCNC: 18 U/L (ref 12–78)
ANION GAP SERPL CALCULATED.3IONS-SCNC: 9 MMOL/L (ref 4–13)
AST SERPL W P-5'-P-CCNC: 19 U/L (ref 5–45)
ATRIAL RATE: 63 BPM
BASOPHILS # BLD AUTO: 0.04 THOUSANDS/ΜL (ref 0–0.1)
BASOPHILS NFR BLD AUTO: 0 % (ref 0–1)
BILIRUB SERPL-MCNC: 0.4 MG/DL (ref 0.2–1)
BUN SERPL-MCNC: 22 MG/DL (ref 5–25)
CALCIUM SERPL-MCNC: 9.3 MG/DL (ref 8.3–10.1)
CHLORIDE SERPL-SCNC: 104 MMOL/L (ref 100–108)
CO2 SERPL-SCNC: 30 MMOL/L (ref 21–32)
CREAT SERPL-MCNC: 0.74 MG/DL (ref 0.6–1.3)
EOSINOPHIL # BLD AUTO: 0.05 THOUSAND/ΜL (ref 0–0.61)
EOSINOPHIL NFR BLD AUTO: 0 % (ref 0–6)
ERYTHROCYTE [DISTWIDTH] IN BLOOD BY AUTOMATED COUNT: 13.8 % (ref 11.6–15.1)
GFR SERPL CREATININE-BSD FRML MDRD: 69 ML/MIN/1.73SQ M
GLUCOSE SERPL-MCNC: 144 MG/DL (ref 65–140)
HCT VFR BLD AUTO: 35.3 % (ref 34.8–46.1)
HGB BLD-MCNC: 11.2 G/DL (ref 11.5–15.4)
IMM GRANULOCYTES # BLD AUTO: 0.06 THOUSAND/UL (ref 0–0.2)
IMM GRANULOCYTES NFR BLD AUTO: 0 % (ref 0–2)
LYMPHOCYTES # BLD AUTO: 1.14 THOUSANDS/ΜL (ref 0.6–4.47)
LYMPHOCYTES NFR BLD AUTO: 8 % (ref 14–44)
MCH RBC QN AUTO: 32.4 PG (ref 26.8–34.3)
MCHC RBC AUTO-ENTMCNC: 31.7 G/DL (ref 31.4–37.4)
MCV RBC AUTO: 102 FL (ref 82–98)
MONOCYTES # BLD AUTO: 0.9 THOUSAND/ΜL (ref 0.17–1.22)
MONOCYTES NFR BLD AUTO: 6 % (ref 4–12)
NEUTROPHILS # BLD AUTO: 12.28 THOUSANDS/ΜL (ref 1.85–7.62)
NEUTS SEG NFR BLD AUTO: 86 % (ref 43–75)
NRBC BLD AUTO-RTO: 0 /100 WBCS
P AXIS: 103 DEGREES
PLATELET # BLD AUTO: 168 THOUSANDS/UL (ref 149–390)
PMV BLD AUTO: 9.4 FL (ref 8.9–12.7)
POTASSIUM SERPL-SCNC: 3.9 MMOL/L (ref 3.5–5.3)
PR INTERVAL: 206 MS
PROT SERPL-MCNC: 7.2 G/DL (ref 6.4–8.2)
QRS AXIS: 11 DEGREES
QRSD INTERVAL: 88 MS
QT INTERVAL: 398 MS
QTC INTERVAL: 407 MS
RBC # BLD AUTO: 3.46 MILLION/UL (ref 3.81–5.12)
SODIUM SERPL-SCNC: 143 MMOL/L (ref 136–145)
T WAVE AXIS: 80 DEGREES
TROPONIN I SERPL-MCNC: <0.02 NG/ML
VENTRICULAR RATE: 63 BPM
WBC # BLD AUTO: 14.47 THOUSAND/UL (ref 4.31–10.16)

## 2018-10-30 PROCEDURE — 93005 ELECTROCARDIOGRAM TRACING: CPT

## 2018-10-30 PROCEDURE — 73502 X-RAY EXAM HIP UNI 2-3 VIEWS: CPT

## 2018-10-30 PROCEDURE — 80053 COMPREHEN METABOLIC PANEL: CPT | Performed by: EMERGENCY MEDICINE

## 2018-10-30 PROCEDURE — 93010 ELECTROCARDIOGRAM REPORT: CPT | Performed by: INTERNAL MEDICINE

## 2018-10-30 PROCEDURE — 36415 COLL VENOUS BLD VENIPUNCTURE: CPT | Performed by: EMERGENCY MEDICINE

## 2018-10-30 PROCEDURE — 85025 COMPLETE CBC W/AUTO DIFF WBC: CPT | Performed by: EMERGENCY MEDICINE

## 2018-10-30 PROCEDURE — 72125 CT NECK SPINE W/O DYE: CPT

## 2018-10-30 PROCEDURE — 84484 ASSAY OF TROPONIN QUANT: CPT | Performed by: EMERGENCY MEDICINE

## 2018-10-30 PROCEDURE — 99223 1ST HOSP IP/OBS HIGH 75: CPT | Performed by: PHYSICIAN ASSISTANT

## 2018-10-30 PROCEDURE — 71045 X-RAY EXAM CHEST 1 VIEW: CPT

## 2018-10-30 PROCEDURE — 70450 CT HEAD/BRAIN W/O DYE: CPT

## 2018-10-30 PROCEDURE — 73552 X-RAY EXAM OF FEMUR 2/>: CPT

## 2018-10-30 PROCEDURE — 99285 EMERGENCY DEPT VISIT HI MDM: CPT

## 2018-10-30 RX ORDER — ACETAMINOPHEN 325 MG/1
650 TABLET ORAL ONCE
Status: COMPLETED | OUTPATIENT
Start: 2018-10-30 | End: 2018-10-30

## 2018-10-30 RX ORDER — LOPERAMIDE HYDROCHLORIDE 2 MG/1
2 CAPSULE ORAL 4 TIMES DAILY PRN
Status: DISCONTINUED | OUTPATIENT
Start: 2018-10-30 | End: 2018-11-02

## 2018-10-30 RX ORDER — LEVOTHYROXINE SODIUM 0.07 MG/1
75 TABLET ORAL
Status: DISCONTINUED | OUTPATIENT
Start: 2018-10-31 | End: 2018-11-01

## 2018-10-30 RX ORDER — ESCITALOPRAM OXALATE 10 MG/1
10 TABLET ORAL DAILY
Status: DISCONTINUED | OUTPATIENT
Start: 2018-10-31 | End: 2018-11-01

## 2018-10-30 RX ORDER — ACETAMINOPHEN 325 MG/1
488 TABLET ORAL EVERY 6 HOURS PRN
Status: DISCONTINUED | OUTPATIENT
Start: 2018-10-30 | End: 2018-10-31

## 2018-10-30 RX ORDER — DONEPEZIL HYDROCHLORIDE 10 MG/1
10 TABLET, FILM COATED ORAL
Status: DISCONTINUED | OUTPATIENT
Start: 2018-10-30 | End: 2018-11-01

## 2018-10-30 RX ORDER — SODIUM CHLORIDE, SODIUM LACTATE, POTASSIUM CHLORIDE, CALCIUM CHLORIDE 600; 310; 30; 20 MG/100ML; MG/100ML; MG/100ML; MG/100ML
75 INJECTION, SOLUTION INTRAVENOUS CONTINUOUS
Status: DISCONTINUED | OUTPATIENT
Start: 2018-10-30 | End: 2018-10-31

## 2018-10-30 RX ORDER — HEPARIN SODIUM 5000 [USP'U]/ML
5000 INJECTION, SOLUTION INTRAVENOUS; SUBCUTANEOUS EVERY 8 HOURS SCHEDULED
Status: DISCONTINUED | OUTPATIENT
Start: 2018-10-30 | End: 2018-10-31

## 2018-10-30 RX ORDER — ALPRAZOLAM 0.25 MG/1
0.25 TABLET ORAL 2 TIMES DAILY PRN
Status: DISCONTINUED | OUTPATIENT
Start: 2018-10-30 | End: 2018-11-06 | Stop reason: HOSPADM

## 2018-10-30 RX ORDER — ACETAMINOPHEN 325 MG/1
650 TABLET ORAL EVERY 6 HOURS PRN
Status: DISCONTINUED | OUTPATIENT
Start: 2018-10-30 | End: 2018-10-31

## 2018-10-30 RX ORDER — LIDOCAINE 50 MG/G
1 PATCH TOPICAL ONCE
Status: COMPLETED | OUTPATIENT
Start: 2018-10-30 | End: 2018-10-31

## 2018-10-30 RX ORDER — ASPIRIN 81 MG/1
81 TABLET, CHEWABLE ORAL DAILY
Status: DISCONTINUED | OUTPATIENT
Start: 2018-10-31 | End: 2018-11-01

## 2018-10-30 RX ORDER — DIVALPROEX SODIUM 250 MG/1
250 TABLET, DELAYED RELEASE ORAL
Status: DISCONTINUED | OUTPATIENT
Start: 2018-10-30 | End: 2018-11-01

## 2018-10-30 RX ADMIN — HEPARIN SODIUM 5000 UNITS: 5000 INJECTION INTRAVENOUS; SUBCUTANEOUS at 23:15

## 2018-10-30 RX ADMIN — ACETAMINOPHEN 650 MG: 325 TABLET, FILM COATED ORAL at 23:50

## 2018-10-30 RX ADMIN — LIDOCAINE 1 PATCH: 50 PATCH TOPICAL at 19:07

## 2018-10-30 RX ADMIN — METOPROLOL TARTRATE 25 MG: 25 TABLET ORAL at 23:15

## 2018-10-30 RX ADMIN — DIVALPROEX SODIUM 250 MG: 250 TABLET, DELAYED RELEASE ORAL at 23:15

## 2018-10-30 RX ADMIN — DONEPEZIL HYDROCHLORIDE 10 MG: 10 TABLET, FILM COATED ORAL at 23:15

## 2018-10-30 RX ADMIN — ACETAMINOPHEN 650 MG: 325 TABLET, FILM COATED ORAL at 19:07

## 2018-10-30 RX ADMIN — SODIUM CHLORIDE, SODIUM LACTATE, POTASSIUM CHLORIDE, AND CALCIUM CHLORIDE 75 ML/HR: .6; .31; .03; .02 INJECTION, SOLUTION INTRAVENOUS at 23:15

## 2018-10-30 NOTE — ED ATTENDING ATTESTATION
Deisi Merino MD, saw and evaluated the patient  I have discussed the patient with the resident/non-physician practitioner and agree with the resident's/non-physician practitioner's findings, Plan of Care, and MDM as documented in the resident's/non-physician practitioner's note, except where noted  All available labs and Radiology studies were reviewed  At this point I agree with the current assessment done in the Emergency Department  I have conducted an independent evaluation of this patient a history and physical is as follows:    59-year-old female presents for evaluation of right hip pain status post unwitnessed fall  Patient was of right hip pain only  She denies loss of conscious, headache, focal numbness or weakness, other traumatic injuries  Ten systems reviewed, otherwise negative  On exam no acute distress, HEENT trauma normal, neuro normal lungs normal cardiac normal, abdomen normal, pelvis stable, right hip is tender to palpation, remainder of extremity trauma exam is within normal limits,  Medical decision making;-traumatic right hip pain with unwitnessed fall-will CT head rule out acute CNS pathology, CT C-spine rule fracture dislocation, cardiac workup, x-ray right hip to rule out fracture/dislocation, p r n   Pain medications  Critical Care Time  CritCare Time    Procedures

## 2018-10-30 NOTE — ED NOTES
Patient transported to [facility], [address] [place] [address]  10/30/18 Rell Manriquez RN  10/30/18 [id number]

## 2018-10-30 NOTE — ED PROVIDER NOTES
History  Chief Complaint   Patient presents with    Leg Pain     Out in yard walking with walker, twisted right leg and fell  Pt now having right hip pain that radiates down leg     Patient is a 12-year-old female with a past medical history significant for prior subdural hematoma, hypertension, hyperlipidemia, CAD, dementia who presents after an unwitnessed fall at nursing facility  Patient was walking with her walker outside and reports that there was grass that was too high for her to clear with her walker resulting in her falling onto right side  Denies LOC  Per nursing home staff, the patient could have been outside for approximately maximum of 25 min  Patient complains of pain in her right hip since the fall  Describes it as sharp, constant, 4/10 intensity, worse with attempt at movement  Complains of chronic neck pain  Denies numbness, tingling, weakness, loss of bowel or bladder control, back pain  Per family who is at bedside, patient is currently at her baseline mentation  Prior to Admission Medications   Prescriptions Last Dose Informant Patient Reported? Taking?    ALPRAZolam (XANAX) 0 25 mg tablet   Yes Yes   Sig: Take 1 tablet by mouth   acetaminophen (TYLENOL) 500 mg tablet   Yes Yes   Sig: Take 500 mg by mouth every 6 (six) hours as needed for mild pain   aspirin 81 mg chewable tablet   No Yes   Sig: CHEW 1 TABLET AND SWALLOW ORALLY DAILY ON MONDAY, WEDNESDAY & FRIDAY (HEART)   divalproex sodium (DEPAKOTE) 250 mg EC tablet   No Yes   Sig: TAKE 1 TABLET ORALLY AT BEDTIME (DO NOT CRUSH) (ALTERED MENTAL STATUS/ANXIETY DISORDER)   donepezil (ARICEPT) 10 mg tablet   No Yes   Sig: TAKE 1 TABLET ORALLY DAILY (MEMORY LOSS)   escitalopram (LEXAPRO) 10 mg tablet   No Yes   Sig: TAKE 1 TABLET ORALLY DAILY (ANXIETY DISORDER)   levothyroxine 75 mcg tablet   No Yes   Sig: TAKE 1 TABLET ORALLY DAILY (THYROID)   loperamide (IMODIUM) 2 mg capsule   No Yes   Sig: Take 1 capsule (2 mg total) by mouth 4 (four) times a day as needed for diarrhea   metoprolol tartrate (LOPRESSOR) 25 mg tablet   No Yes   Sig: TAKE 1 TABLET ORALLY TWICE DAILY ( BLOOD PRESSURE )   polyethylene glycol (GLYCOLAX) powder   Yes Yes   Sig: Take 17 g by mouth daily      Facility-Administered Medications: None       Past Medical History:   Diagnosis Date    Abnormal CT scan, neck     Last Assessed:  5/28/14    Altered mental status 11/22/2017    Anxiety     Basal cell adenocarcinoma     Benign essential hypertension     Last Assessed:  1/14/13    Coronary artery disease     Dementia 2006    Depression     Diverticulosis     Essential hypertension 11/22/2017    Hyperlipidemia     Hypertension     Hypothyroid 11/22/2017    Hypothyroid     Insomnia     Long term (current) use of antithrombotics/antiplatelets     Last Assessed:  9/23/13    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 ([facility] [address]     MI (myocardial infarction) ([facility] [address] )     Last Assessed:  6/11/15    Subdural hematoma, post-traumatic ([facility] [address] ) 2014    left sided, treated nonoperatively    Subdural hygroma 2014    s/p subdural hematoma    TIA (transient ischemic attack)     Unstable angina (HCC) 2015    Vertigo        Past Surgical History:   Procedure Laterality Date    BREAST LUMPECTOMY Right     CATARACT EXTRACTION      CHOLECYSTECTOMY      COLONOSCOPY  2003    HYSTERECTOMY      Total abdominal with removal of both ovaries       Family History   Problem Relation Age of Onset    Diabetes Mother     Lung cancer Mother     Colon cancer Father     Dementia Father     Prostate cancer Brother      I have reviewed and agree with the history as documented  Social History   Substance Use Topics    Smoking status: Never Smoker    Smokeless tobacco: Never Used    Alcohol use No      Comment: Per [employer]:  Social drinker        Review of Systems   Constitutional: Negative for chills and fever  HENT: Negative for congestion and rhinorrhea      Eyes: Negative for photophobia and visual disturbance  Respiratory: Negative for chest tightness and shortness of breath  Cardiovascular: Negative for chest pain and palpitations  Gastrointestinal: Negative for abdominal pain, constipation, diarrhea, nausea and vomiting  Genitourinary: Negative for dysuria, flank pain and hematuria  Musculoskeletal: Positive for neck pain (chronic neck pain)  Negative for back pain  Right leg pain   Skin: Negative for pallor and rash  Neurological: Negative for dizziness, weakness, light-headedness, numbness and headaches  Physical Exam  ED Triage Vitals [10/30/18 1718]   Temperature Pulse Respirations Blood Pressure SpO2   98 5 °F (36 9 °C) 55 14 (!) 185/56 94 %      Temp Source Heart Rate Source Patient Position - Orthostatic VS BP Location FiO2 (%)   Oral Monitor Lying Right arm --      Pain Score       4           Orthostatic Vital Signs  Vitals:    10/30/18 1718 10/30/18 1900 10/30/18 2000   BP: (!) 185/56 137/65 (!) 115/44   Pulse: 55 62 64   Patient Position - Orthostatic VS: Lying Lying Lying       Physical Exam   Constitutional: She appears well-developed  No distress  Cachectic and frail appearing   HENT:   Head: Normocephalic and atraumatic  Right Ear: External ear normal    Left Ear: External ear normal    Nose: Nose normal    Mouth/Throat: Oropharynx is clear and moist    Eyes: Pupils are equal, round, and reactive to light  Conjunctivae and EOM are normal    Neck: Normal range of motion  Neck supple  Cardiovascular: Normal rate, regular rhythm, normal heart sounds and intact distal pulses  Exam reveals no gallop and no friction rub  No murmur heard  Pulmonary/Chest: Effort normal and breath sounds normal  No respiratory distress  She has no wheezes  She has no rales  She exhibits no tenderness  Abdominal: Soft  Bowel sounds are normal  She exhibits no distension  There is no tenderness  There is no guarding     Musculoskeletal: She exhibits no edema  Right hip: She exhibits decreased range of motion, decreased strength, tenderness and bony tenderness  She exhibits no swelling, no crepitus, no deformity and no laceration  Right knee: Normal  She exhibits normal range of motion, no swelling, no effusion, no ecchymosis, no deformity and no laceration  Right ankle: Normal  She exhibits normal range of motion, no swelling, no ecchymosis, no deformity, no laceration and normal pulse  Legs:       Right foot: There is normal range of motion, no tenderness, no bony tenderness, no swelling, normal capillary refill, no crepitus, no deformity and no laceration  Feet:    No midline c-t-l-spine tenderness, deformities, step-offs  Tender over the right cervical paraspinal musculature    Neurological: She is alert  She is disoriented  Moves all 4 extremities   AAOX2  Alert to self and place, but unable to tell me year   Skin: Skin is warm and dry  No rash noted  She is not diaphoretic  No erythema  No pallor  Psychiatric: She has a normal mood and affect  Her behavior is normal    Nursing note and vitals reviewed        ED Medications  Medications   lidocaine (LIDODERM) 5 % patch 1 patch (1 patch Topical Medication Applied 10/30/18 1907)   acetaminophen (TYLENOL) tablet 650 mg (650 mg Oral Given 10/30/18 1907)       Diagnostic Studies  Results Reviewed     Procedure Component Value Units Date/Time    Comprehensive metabolic panel [[id number]]  (Abnormal) Collected:  10/30/18 1855    Lab Status:  Final result Specimen:  Blood from Arm, Left Updated:  10/30/18 1931     Sodium 143 mmol/L      Potassium 3 9 mmol/L      Chloride 104 mmol/L      CO2 30 mmol/L      ANION GAP 9 mmol/L      BUN 22 mg/dL      Creatinine 0 74 mg/dL      Glucose 144 (H) mg/dL      Calcium 9 3 mg/dL      AST 19 U/L      ALT 18 U/L      Alkaline Phosphatase 57 U/L      Total Protein 7 2 g/dL      Albumin 3 5 g/dL      Total Bilirubin 0 40 mg/dL      eGFR 71 ml/min/1 73sq m     Narrative:         National Kidney Disease Education Program recommendations are as follows:  GFR calculation is accurate only with a steady state creatinine  Chronic Kidney disease less than 60 ml/min/1 73 sq  meters  Kidney failure less than 15 ml/min/1 73 sq  meters  Troponin I [[id number]]  (Normal) Collected:  10/30/18 1855    Lab Status:  Final result Specimen:  Blood from Arm, Left Updated:  10/30/18 1922     Troponin I <0 02 ng/mL     CBC and differential [[id number]]  (Abnormal) Collected:  10/30/18 1855    Lab Status:  Final result Specimen:  Blood from Arm, Left Updated:  10/30/18 1904     WBC 14 47 (H) Thousand/uL      RBC 3 46 (L) Million/uL      Hemoglobin 11 2 (L) g/dL      Hematocrit 35 3 %       (H) fL      MCH 32 4 pg      MCHC 31 7 g/dL      RDW 13 8 %      MPV 9 4 fL      Platelets 038 Thousands/uL      nRBC 0 /100 WBCs      Neutrophils Relative 86 (H) %      Immat GRANS % 0 %      Lymphocytes Relative 8 (L) %      Monocytes Relative 6 %      Eosinophils Relative 0 %      Basophils Relative 0 %      Neutrophils Absolute 12 28 (H) Thousands/µL      Immature Grans Absolute 0 06 Thousand/uL      Lymphocytes Absolute 1 14 Thousands/µL      Monocytes Absolute 0 90 Thousand/µL      Eosinophils Absolute 0 05 Thousand/µL      Basophils Absolute 0 04 Thousands/µL     POCT urinalysis dipstick [[id number]]     Lab Status:  No result Specimen:  Urine                  XR femur 2 views RIGHT   ED Interpretation by Filemon Solano DO (10/30 2050)   No acute fracture as interpreted by me independently       CT cervical spine without contrast   Final Result by Kennedy Rojas DO (10/30 1942)   No cervical spine fracture or traumatic malalignment  Workstation performed: [id number]         CT head without contrast   Final Result by Kennedy Rojas DO (10/30 1921)   Right posterior parietal/occipital scalp hematoma  No acute intracranial abnormality  Workstation performed: [id number]         XR chest 1 view   ED Interpretation by Kedar Ruiz DO (10/30 1900)   No acute abnormalities as interpreted by me independently        by Daiana Desai (10/30 1832)      XR hip/pelv 2-3 vws right   ED Interpretation by Kedar Ruiz DO (10/30 1349)   Abnormal   Acute right femoral head and neck fracture as interpreted by me independently            Procedures  ECG 12 Lead Documentation  Date/Time: 10/30/2018 9:10 PM  Performed by: Army Reece by: Colleen Watters     Indications / Diagnosis:  Unwitnessed fall  ECG reviewed by me, the ED Provider: yes    Patient location:  ED  Previous ECG:     Previous ECG:  Compared to current    Comparison ECG info:  11/21/2017    Similarity:  No change  Interpretation:     Interpretation: non-specific    Rate:     ECG rate:  63    ECG rate assessment: normal    Rhythm:     Rhythm: sinus rhythm    Ectopy:     Ectopy: none    QRS:     QRS axis:  Normal  Conduction:     Conduction: abnormal      Abnormal conduction: 1st degree    ST segments:     ST segments:  Normal  T waves:     T waves: inverted      Inverted:  AVL          Phone Consults  ED Phone Contact    ED Course                               MDM  Number of Diagnoses or Management Options  Fall from standing, initial encounter:   Femur fracture [facility]):   Diagnosis management comments: Assessment and plan:  Patient is able to detail how she fell, but secondary to dementia unsure whether this is accurate  Will get a CT scan of the head to rule out acute intracranial hemorrhage, CT cervical spine to rule out acute fracture, chest x-ray to rule out pneumothorax/rib fractures, x-ray of the right hip/pelvis to rule out acute fracture  Will also check labs to evaluate for electrolyte abnormalities, anemia, leukocytosis  Will also check EKG to rule out arrhythmia/ STEMI and troponin to rule out NSTEMI      Patient has a right-sided intertrochanteric femur fracture for which the patient will be admitted to [facility]  CritCare Time    Disposition  Final diagnoses:   Femur fracture ([facility] [address] )   Fall from standing, initial encounter     Time reflects when diagnosis was documented in both MDM as applicable and the Disposition within this note     Time User Action Codes Description Comment    10/30/2018  9:01 PM Clementinen Eulogio Add [S72 [id number]] Femur fracture ([facility] [address] )     10/30/2018  9:01 PM Everlyn Eulogio Add Jaja Bucion  XXXA] Fall from standing, initial encounter       ED Disposition     ED Disposition Condition Comment    Admit  Case was discussed with Swapna MOODY and the patient's admission status was agreed to be Admission Status: inpatient status to the service of Dr Sarah Nolan          Follow-up Information    None         Patient's Medications   Discharge Prescriptions    No medications on file     No discharge procedures on file  ED Provider  Attending physically available and evaluated Daylin Hanson I managed the patient along with the ED Attending      Electronically Signed by         Lendon Aase, DO  10/30/18 7162

## 2018-10-31 ENCOUNTER — APPOINTMENT (INPATIENT)
Dept: RADIOLOGY | Facility: HOSPITAL | Age: 83
DRG: 481 | End: 2018-10-31
Payer: MEDICARE

## 2018-10-31 ENCOUNTER — ANESTHESIA EVENT (INPATIENT)
Dept: PERIOP | Facility: HOSPITAL | Age: 83
DRG: 481 | End: 2018-10-31
Payer: MEDICARE

## 2018-10-31 ENCOUNTER — ANESTHESIA (INPATIENT)
Dept: PERIOP | Facility: HOSPITAL | Age: 83
DRG: 481 | End: 2018-10-31
Payer: MEDICARE

## 2018-10-31 PROBLEM — S00.03XA: Status: ACTIVE | Noted: 2018-10-31

## 2018-10-31 LAB
ABO GROUP BLD: NORMAL
ANION GAP SERPL CALCULATED.3IONS-SCNC: 6 MMOL/L (ref 4–13)
BLD GP AB SCN SERPL QL: NEGATIVE
BUN SERPL-MCNC: 20 MG/DL (ref 5–25)
CALCIUM SERPL-MCNC: 8.8 MG/DL (ref 8.3–10.1)
CHLORIDE SERPL-SCNC: 106 MMOL/L (ref 100–108)
CO2 SERPL-SCNC: 31 MMOL/L (ref 21–32)
CREAT SERPL-MCNC: 0.68 MG/DL (ref 0.6–1.3)
ERYTHROCYTE [DISTWIDTH] IN BLOOD BY AUTOMATED COUNT: 13.9 % (ref 11.6–15.1)
GFR SERPL CREATININE-BSD FRML MDRD: 75 ML/MIN/1.73SQ M
GLUCOSE SERPL-MCNC: 116 MG/DL (ref 65–140)
HCT VFR BLD AUTO: 31.2 % (ref 34.8–46.1)
HGB BLD-MCNC: 10 G/DL (ref 11.5–15.4)
MCH RBC QN AUTO: 32.5 PG (ref 26.8–34.3)
MCHC RBC AUTO-ENTMCNC: 32.1 G/DL (ref 31.4–37.4)
MCV RBC AUTO: 101 FL (ref 82–98)
PLATELET # BLD AUTO: 133 THOUSANDS/UL (ref 149–390)
PMV BLD AUTO: 9.7 FL (ref 8.9–12.7)
POTASSIUM SERPL-SCNC: 3.8 MMOL/L (ref 3.5–5.3)
RBC # BLD AUTO: 3.08 MILLION/UL (ref 3.81–5.12)
RH BLD: POSITIVE
SODIUM SERPL-SCNC: 143 MMOL/L (ref 136–145)
SPECIMEN EXPIRATION DATE: NORMAL
WBC # BLD AUTO: 7.3 THOUSAND/UL (ref 4.31–10.16)

## 2018-10-31 PROCEDURE — G8998 SWALLOW D/C STATUS: HCPCS

## 2018-10-31 PROCEDURE — 86920 COMPATIBILITY TEST SPIN: CPT

## 2018-10-31 PROCEDURE — 73552 X-RAY EXAM OF FEMUR 2/>: CPT

## 2018-10-31 PROCEDURE — C1713 ANCHOR/SCREW BN/BN,TIS/BN: HCPCS | Performed by: ORTHOPAEDIC SURGERY

## 2018-10-31 PROCEDURE — 86901 BLOOD TYPING SEROLOGIC RH(D): CPT | Performed by: ANESTHESIOLOGY

## 2018-10-31 PROCEDURE — 92522 EVALUATE SPEECH PRODUCTION: CPT

## 2018-10-31 PROCEDURE — 86900 BLOOD TYPING SEROLOGIC ABO: CPT | Performed by: ANESTHESIOLOGY

## 2018-10-31 PROCEDURE — C1769 GUIDE WIRE: HCPCS | Performed by: ORTHOPAEDIC SURGERY

## 2018-10-31 PROCEDURE — 27245 TREAT THIGH FRACTURE: CPT | Performed by: ORTHOPAEDIC SURGERY

## 2018-10-31 PROCEDURE — 86850 RBC ANTIBODY SCREEN: CPT | Performed by: ANESTHESIOLOGY

## 2018-10-31 PROCEDURE — 99222 1ST HOSP IP/OBS MODERATE 55: CPT | Performed by: ORTHOPAEDIC SURGERY

## 2018-10-31 PROCEDURE — 0QS636Z REPOSITION RIGHT UPPER FEMUR WITH INTRAMEDULLARY INTERNAL FIXATION DEVICE, PERCUTANEOUS APPROACH: ICD-10-PCS | Performed by: ORTHOPAEDIC SURGERY

## 2018-10-31 PROCEDURE — 80048 BASIC METABOLIC PNL TOTAL CA: CPT | Performed by: PHYSICIAN ASSISTANT

## 2018-10-31 PROCEDURE — G8997 SWALLOW GOAL STATUS: HCPCS

## 2018-10-31 PROCEDURE — 27245 TREAT THIGH FRACTURE: CPT | Performed by: PHYSICIAN ASSISTANT

## 2018-10-31 PROCEDURE — 85027 COMPLETE CBC AUTOMATED: CPT | Performed by: PHYSICIAN ASSISTANT

## 2018-10-31 PROCEDURE — 99232 SBSQ HOSP IP/OBS MODERATE 35: CPT | Performed by: FAMILY MEDICINE

## 2018-10-31 PROCEDURE — 73502 X-RAY EXAM HIP UNI 2-3 VIEWS: CPT

## 2018-10-31 PROCEDURE — G8996 SWALLOW CURRENT STATUS: HCPCS

## 2018-10-31 PROCEDURE — 99024 POSTOP FOLLOW-UP VISIT: CPT | Performed by: ORTHOPAEDIC SURGERY

## 2018-10-31 DEVICE — 11.0MM TI TROCH FIXATION NAIL SCREW/95MM - STERILE: Type: IMPLANTABLE DEVICE | Site: LEG | Status: FUNCTIONAL

## 2018-10-31 DEVICE — 5.0MM TI LOCKING SCREW 36MM- FOR IM NAILS-STERILE: Type: IMPLANTABLE DEVICE | Site: LEG | Status: FUNCTIONAL

## 2018-10-31 DEVICE — 11MM/130 DEG TI CANN TROCH FIXATION NAIL 170MM-STERILE: Type: IMPLANTABLE DEVICE | Site: LEG | Status: FUNCTIONAL

## 2018-10-31 RX ORDER — PROPOFOL 10 MG/ML
INJECTION, EMULSION INTRAVENOUS AS NEEDED
Status: DISCONTINUED | OUTPATIENT
Start: 2018-10-31 | End: 2018-10-31 | Stop reason: SURG

## 2018-10-31 RX ORDER — FUROSEMIDE 10 MG/ML
INJECTION INTRAMUSCULAR; INTRAVENOUS AS NEEDED
Status: DISCONTINUED | OUTPATIENT
Start: 2018-10-31 | End: 2018-10-31 | Stop reason: SURG

## 2018-10-31 RX ORDER — ONDANSETRON 2 MG/ML
4 INJECTION INTRAMUSCULAR; INTRAVENOUS ONCE AS NEEDED
Status: DISCONTINUED | OUTPATIENT
Start: 2018-10-31 | End: 2018-10-31 | Stop reason: HOSPADM

## 2018-10-31 RX ORDER — DOCUSATE SODIUM 100 MG/1
100 CAPSULE, LIQUID FILLED ORAL 2 TIMES DAILY
Status: DISCONTINUED | OUTPATIENT
Start: 2018-10-31 | End: 2018-11-01

## 2018-10-31 RX ORDER — SODIUM CHLORIDE 9 MG/ML
INJECTION, SOLUTION INTRAVENOUS CONTINUOUS PRN
Status: DISCONTINUED | OUTPATIENT
Start: 2018-10-31 | End: 2018-10-31 | Stop reason: SURG

## 2018-10-31 RX ORDER — FENTANYL CITRATE 50 UG/ML
INJECTION, SOLUTION INTRAMUSCULAR; INTRAVENOUS AS NEEDED
Status: DISCONTINUED | OUTPATIENT
Start: 2018-10-31 | End: 2018-10-31 | Stop reason: SURG

## 2018-10-31 RX ORDER — CALCIUM CARBONATE 200(500)MG
1000 TABLET,CHEWABLE ORAL DAILY PRN
Status: DISCONTINUED | OUTPATIENT
Start: 2018-10-31 | End: 2018-11-06 | Stop reason: HOSPADM

## 2018-10-31 RX ORDER — PANTOPRAZOLE SODIUM 40 MG/1
40 TABLET, DELAYED RELEASE ORAL
Status: DISCONTINUED | OUTPATIENT
Start: 2018-11-01 | End: 2018-11-01

## 2018-10-31 RX ORDER — MAGNESIUM HYDROXIDE/ALUMINUM HYDROXICE/SIMETHICONE 120; 1200; 1200 MG/30ML; MG/30ML; MG/30ML
30 SUSPENSION ORAL EVERY 6 HOURS PRN
Status: DISCONTINUED | OUTPATIENT
Start: 2018-10-31 | End: 2018-11-06 | Stop reason: HOSPADM

## 2018-10-31 RX ORDER — ACETAMINOPHEN 325 MG/1
650 TABLET ORAL EVERY 6 HOURS PRN
Status: DISCONTINUED | OUTPATIENT
Start: 2018-10-31 | End: 2018-11-02

## 2018-10-31 RX ORDER — SODIUM CHLORIDE 9 MG/ML
75 INJECTION, SOLUTION INTRAVENOUS CONTINUOUS
Status: DISCONTINUED | OUTPATIENT
Start: 2018-10-31 | End: 2018-11-01

## 2018-10-31 RX ORDER — EPHEDRINE SULFATE 50 MG/ML
INJECTION, SOLUTION INTRAVENOUS AS NEEDED
Status: DISCONTINUED | OUTPATIENT
Start: 2018-10-31 | End: 2018-10-31 | Stop reason: SURG

## 2018-10-31 RX ORDER — SENNOSIDES 8.6 MG
1 TABLET ORAL DAILY
Status: DISCONTINUED | OUTPATIENT
Start: 2018-11-01 | End: 2018-11-01

## 2018-10-31 RX ORDER — ONDANSETRON 2 MG/ML
INJECTION INTRAMUSCULAR; INTRAVENOUS AS NEEDED
Status: DISCONTINUED | OUTPATIENT
Start: 2018-10-31 | End: 2018-10-31 | Stop reason: SURG

## 2018-10-31 RX ORDER — FENTANYL CITRATE/PF 50 MCG/ML
12.5 SYRINGE (ML) INJECTION
Status: DISCONTINUED | OUTPATIENT
Start: 2018-10-31 | End: 2018-10-31 | Stop reason: HOSPADM

## 2018-10-31 RX ORDER — SIMETHICONE 80 MG
80 TABLET,CHEWABLE ORAL 4 TIMES DAILY PRN
Status: DISCONTINUED | OUTPATIENT
Start: 2018-10-31 | End: 2018-11-06 | Stop reason: HOSPADM

## 2018-10-31 RX ORDER — ROCURONIUM BROMIDE 10 MG/ML
INJECTION, SOLUTION INTRAVENOUS AS NEEDED
Status: DISCONTINUED | OUTPATIENT
Start: 2018-10-31 | End: 2018-10-31 | Stop reason: SURG

## 2018-10-31 RX ORDER — MAGNESIUM HYDROXIDE 1200 MG/15ML
LIQUID ORAL AS NEEDED
Status: DISCONTINUED | OUTPATIENT
Start: 2018-10-31 | End: 2018-10-31 | Stop reason: HOSPADM

## 2018-10-31 RX ORDER — GLYCOPYRROLATE 0.2 MG/ML
INJECTION INTRAMUSCULAR; INTRAVENOUS AS NEEDED
Status: DISCONTINUED | OUTPATIENT
Start: 2018-10-31 | End: 2018-10-31 | Stop reason: SURG

## 2018-10-31 RX ADMIN — FUROSEMIDE 40 MG: 10 INJECTION, SOLUTION INTRAMUSCULAR; INTRAVENOUS at 19:06

## 2018-10-31 RX ADMIN — PROPOFOL 80 MG: 10 INJECTION, EMULSION INTRAVENOUS at 17:12

## 2018-10-31 RX ADMIN — SODIUM CHLORIDE, SODIUM LACTATE, POTASSIUM CHLORIDE, AND CALCIUM CHLORIDE 75 ML/HR: .6; .31; .03; .02 INJECTION, SOLUTION INTRAVENOUS at 14:44

## 2018-10-31 RX ADMIN — EPHEDRINE SULFATE 10 MG: 50 INJECTION, SOLUTION INTRAMUSCULAR; INTRAVENOUS; SUBCUTANEOUS at 18:34

## 2018-10-31 RX ADMIN — LIDOCAINE HYDROCHLORIDE 30 MG: 20 INJECTION, SOLUTION INTRAVENOUS at 17:12

## 2018-10-31 RX ADMIN — GLYCOPYRROLATE 0.4 MG: 0.2 INJECTION, SOLUTION INTRAMUSCULAR; INTRAVENOUS at 18:11

## 2018-10-31 RX ADMIN — ACETAMINOPHEN 650 MG: 325 TABLET, FILM COATED ORAL at 22:10

## 2018-10-31 RX ADMIN — NEOSTIGMINE METHYLSULFATE 3 MG: 1 INJECTION, SOLUTION INTRAMUSCULAR; INTRAVENOUS; SUBCUTANEOUS at 18:47

## 2018-10-31 RX ADMIN — SODIUM CHLORIDE: 0.9 INJECTION, SOLUTION INTRAVENOUS at 17:15

## 2018-10-31 RX ADMIN — ROCURONIUM BROMIDE 30 MG: 10 INJECTION INTRAVENOUS at 17:12

## 2018-10-31 RX ADMIN — ASPIRIN 81 MG 81 MG: 81 TABLET ORAL at 10:25

## 2018-10-31 RX ADMIN — MORPHINE SULFATE 2 MG: 2 INJECTION, SOLUTION INTRAMUSCULAR; INTRAVENOUS at 12:36

## 2018-10-31 RX ADMIN — LEVOTHYROXINE SODIUM 75 MCG: 75 TABLET ORAL at 06:04

## 2018-10-31 RX ADMIN — FENTANYL CITRATE 50 MCG: 50 INJECTION, SOLUTION INTRAMUSCULAR; INTRAVENOUS at 17:57

## 2018-10-31 RX ADMIN — ROCURONIUM BROMIDE 10 MG: 10 INJECTION INTRAVENOUS at 17:39

## 2018-10-31 RX ADMIN — METOPROLOL TARTRATE 25 MG: 25 TABLET ORAL at 10:24

## 2018-10-31 RX ADMIN — HEPARIN SODIUM 5000 UNITS: 5000 INJECTION INTRAVENOUS; SUBCUTANEOUS at 14:09

## 2018-10-31 RX ADMIN — HEPARIN SODIUM 5000 UNITS: 5000 INJECTION INTRAVENOUS; SUBCUTANEOUS at 06:04

## 2018-10-31 RX ADMIN — ROCURONIUM BROMIDE 10 MG: 10 INJECTION INTRAVENOUS at 17:50

## 2018-10-31 RX ADMIN — CEFAZOLIN SODIUM 1000 MG: 1 SOLUTION INTRAVENOUS at 17:33

## 2018-10-31 RX ADMIN — ESCITALOPRAM OXALATE 10 MG: 10 TABLET ORAL at 10:24

## 2018-10-31 RX ADMIN — DIVALPROEX SODIUM 250 MG: 250 TABLET, DELAYED RELEASE ORAL at 22:10

## 2018-10-31 RX ADMIN — SODIUM CHLORIDE 75 ML/HR: 0.9 INJECTION, SOLUTION INTRAVENOUS at 20:00

## 2018-10-31 RX ADMIN — GLYCOPYRROLATE 0.3 MG: 0.2 INJECTION, SOLUTION INTRAMUSCULAR; INTRAVENOUS at 18:47

## 2018-10-31 RX ADMIN — FENTANYL CITRATE 25 MCG: 50 INJECTION, SOLUTION INTRAMUSCULAR; INTRAVENOUS at 17:12

## 2018-10-31 RX ADMIN — DONEPEZIL HYDROCHLORIDE 10 MG: 10 TABLET, FILM COATED ORAL at 22:10

## 2018-10-31 RX ADMIN — FENTANYL CITRATE 25 MCG: 50 INJECTION, SOLUTION INTRAMUSCULAR; INTRAVENOUS at 18:22

## 2018-10-31 RX ADMIN — ONDANSETRON HYDROCHLORIDE 4 MG: 2 INJECTION, SOLUTION INTRAVENOUS at 18:49

## 2018-10-31 RX ADMIN — SODIUM CHLORIDE, SODIUM LACTATE, POTASSIUM CHLORIDE, AND CALCIUM CHLORIDE: .6; .31; .03; .02 INJECTION, SOLUTION INTRAVENOUS at 18:53

## 2018-10-31 NOTE — DISCHARGE INSTRUCTIONS
HIP FRACTURE DISCHARGE INSTRUCTIONS    Surgical Dressing: You may remove your dressing 7 days from the date of your surgery then change your dressing daily until drainage stops  Maintain steri strips  Let them fall off on their own  Do not put any lotions or creams on your incision  If there are any signs of infection such as drainage persisting beyond a few days, unusual looking drainage (yellow, green), increased redness around the incision, or fever/chills let your doctor know  Medications:  Upon discharge you will be given a prescription for an anticoagulant (i e  Ecotrin (Aspirin), Coumadin (Warfarin), Lovenox (Enoxaparin)) and a narcotic pain reliever  Do not take any over the counter NSAIDs (i e  Ibuprofen, Motrin, Advil, Naprosyn, Aleve) while on your anticoagulation medication  You will need to be on your anticoagulation medication for 1 month after the date of surgery  Narcotic pain relievers cannot be refilled over the phone  Please be mindful of the number of pills you have left so you can  a prescription for a refill if needed during office hours  Walking: You may weight bear as tolerated  Use a walker for EVERY step  Gradually increase your walking daily  You can progress to a cane as tolerated once advised by your physical therapist       Bathing:  No tub baths  Do not submerge your incision  You may shower and let water run over your incisions 1 week after surgery  You may sit on a shower chair or stand and shower briefly  Use your crutches/walker to get in and out of the shower  Sexual Relations:  Resume according to your comfort  Swimming:  No swimming or hot tubs until approved by your physician  Swimming will be allowed once your incision is well healed  Driving: You may ride as a passenger now  No driving until your follow-up appointment  To get into the car use the front passenger seat with the seat pushed back as far as possible    Scoot yourself back in the seat  Use your hands to assist your legs into the car  Physical therapy:  When you have finished with home visiting PT, you may begin outpatient PT  Call your surgeons office if you have not already received a prescription  A prescription can be faxed to the outpatient center of your choice  Special considerations: To minimize swelling, stiffness, and decrease pain use cold as needed, but not heat  Ice 20 minutes at a time with a cloth between your skin and the ice  Ice after walking, when you have pain, or after you have completed your exercises  Limit your sitting to 60 minutes at one time  Follow up:  Call [phone] to make an appointment to see your surgeon within 2 weeks of your surgery if you havent done so already  If you have any questions during business hours please call the direct office phone number [phone]  Otherwise please call [phone] for any concerns

## 2018-10-31 NOTE — ASSESSMENT & PLAN NOTE
· Continue ASA 81mg  · Echocardiogram 2017 revealed LVEF:  55%-65% with mild aortic regurgitation, mild tricuspid regurgitation, mild pulmonic valve regurgitation  · NSTEMI May 2014, recurrent chest pain episodes May 2015 with a peak troponin of 1 5 and 3 hospitalizations for chest pain in June 2015 with a peak troponin of 0 76  · Patient currently chest pain free and with a normal ECG, she can proceed with hip repair surgery

## 2018-10-31 NOTE — H&P
History and Physical - [facility] Internal Medicine    Patient Information: Suzi Mcardle 80 y o  female MRN: [id number]  Unit/Bed#: [address] 2 [address] [id number] Encounter: [id number]  Admitting Physician: Danni Mayorga PA-C  PCP: Shira Kramer DO  Date of Admission:  10/30/18      * Closed 2-part intertrochanteric fracture of proximal end of right femur [facility])   Assessment & Plan    · Will make NPO and consult Orthopedics  · Patient's ambulatory status is with a walker although she will frequently ambulate without a walker  · Was living at assisted living facility due to need for close monitoring/administration of medication regimens and will need rehab following hip fracture treatment     Lytic bone lesion of right femur   Assessment & Plan    · Lateral femur film on hip series shows large lytic area of bone and subsequent AP views of the femur Show cortical thinning at the distal aspect within the available imaging  · Daughters indicate a 30 lb-40 lb weight loss over the past year  · Patient had what sounds like a lymph node excision followed by chemotherapy and radiation in the 1990s and was unclear if this was a long or breast malignancy    · Will await Orthopedics evaluation and discussion with family for desires regarding further workup     History of subdural hemorrhage   Assessment & Plan    · May 2014  · Managed medically     Hx of non-ST elevation myocardial infarction (NSTEMI)   Assessment & Plan    · Continue ASA 81mg  · Echocardiogram 2017 revealed LVEF:  55%-65% with mild aortic regurgitation, mild tricuspid regurgitation, mild pulmonic valve regurgitation  · NSTEMI May 2014, recurrent chest pain episodes May 2015 with a peak troponin of 1 5 and 3 hospitalizations for chest pain in June 2015 with a peak troponin of 0 76     Ambulatory dysfunction   Assessment & Plan    · Patient ambulates with a walker at baseline but is independent with all other ADLs  · Patient will occasionally forget to take her walker with her at times creating a fall risk     Mild cognitive impairment   Assessment & Plan    · Continue Aricept     Generalized anxiety disorder   Assessment & Plan    · Continue Lexapro, Depakote q h s , Xanax     Hypothyroidism   Assessment & Plan    · Continue levothyroxine     Benign essential hypertension   Assessment & Plan    · Continue Lopressor  · Will add hydralazine p r n  HPI:   Sheridan Petit is a 80 y o  female who resides at [facility]  She was ambulating behind the residence building today when she was utilizing her walker when she went onto grass and fell from a standing position  She was unable to get up  The fall was unwitnessed  Staff who did find her feels she may have been on the ground for about 25 minutes  Upon coming to the emergency department, workup revealed a right intertrochanteric hip fracture  Incidentally on the lateral x-rays more so than on the AP, a lytic lesion was identified in the mid shaft  Her daughters indicate that she has lost about 30 lb to 40 lb over the last year  She has had a generalized decreased appetite  The patient reports the usual constipation with no melena and no hematochezia when she does have a bowel movement  She denies abdominal pain  She denies midshaft femur pain prior to the fall today and localizes pain to the right hip  Denies: Chest pain, Shortness of Breath, Nausea, Vomiting, Diarrhea, Dysuria    ROS:  A 12-point review of systems was done  Please see the HPI for the full details  All other systems negative      PMH:  Principal Problem:    Closed 2-part intertrochanteric fracture of proximal end of right femur ([address] )  Active Problems:    Lytic bone lesion of right femur    Ambulatory dysfunction    Hx of non-ST elevation myocardial infarction (NSTEMI)    History of subdural hemorrhage    Benign essential hypertension    Hypothyroidism    Generalized anxiety disorder    Dementia without behavioral disturbance    Coronary artery disease    Mild cognitive impairment    Vitamin D deficiency      Past Medical History:   Diagnosis Date    Abnormal CT scan, neck     Last Assessed:  5/28/14    Altered mental status 11/22/2017    Anxiety     Basal cell adenocarcinoma     Benign essential hypertension     Last Assessed:  1/14/13    Closed 2-part intertrochanteric fracture of proximal end of right femur ([facility] [address] ) 10/30/2018    Coronary artery disease     Dementia 2006    Depression     Diverticulosis     Essential hypertension 11/22/2017    Hyperlipidemia     Hypertension     Hypothyroid 11/22/2017    Hypothyroid     Insomnia     Long term (current) use of antithrombotics/antiplatelets     Last Assessed:  9/23/13    Lytic bone lesion of right femur 10/30/2018    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 ([facility] [address] )     [facility]     Last Assessed:  6/11/15    Subdural hematoma, post-traumatic ([facility] [address] ) 2014    left sided, treated nonoperatively    Subdural hygroma 2014    s/p subdural hematoma    TIA (transient ischemic attack)     Unstable angina ([facility] [address] ) 2015    Vertigo      Past Surgical History:   Procedure Laterality Date    BREAST LUMPECTOMY Right     CATARACT EXTRACTION      CHOLECYSTECTOMY      COLONOSCOPY  2003    HYSTERECTOMY      Total abdominal with removal of both ovaries     Social History     Social History    Marital status:       Spouse name: N/A    Number of children: N/A    Years of education: N/A     Social History Main Topics    Smoking status: Never Smoker    Smokeless tobacco: Never Used    Alcohol use No      Comment: Per [employer]:  Social drinker    Drug use: No    Sexual activity: Not on file     Other Topics Concern    Not on file     Social History Narrative    Living in an assisted living facility     Family History   Problem Relation Age of Onset    Diabetes Mother     Lung cancer Mother     Colon cancer Father     Dementia Father     Prostate cancer Brother        MED/ALLERGIES:  Current Facility-Administered Medications   Medication Dose Route Frequency Provider Last Rate Last Dose    acetaminophen (TYLENOL) tablet 488 mg  488 mg Oral Q6H PRN Chon Blake PA-C        acetaminophen (TYLENOL) tablet 650 mg  650 mg Oral Q6H PRN Chon Blake PA-C        ALPRAZolam Earleen Bump) tablet 0 25 mg  0 25 mg Oral BID PRN Chon Blake PA-C        [START ON 10/31/2018] aspirin chewable tablet 81 mg  81 mg Oral Daily Braxton Arriaga PA-C        divalproex sodium (DEPAKOTE) EC tablet 250 mg  250 mg Oral HS Chon Blake PA-C        donepezil (ARICEPT) tablet 10 mg  10 mg Oral HS Chon Blake PA-C        [START ON 10/31/2018] escitalopram (LEXAPRO) tablet 10 mg  10 mg Oral Daily Chon Blake PA-C        heparin (porcine) subcutaneous injection 5,000 Units  5,000 Units Subcutaneous [facility] Chon Blake PA-C        lactated ringers infusion  75 mL/hr Intravenous Continuous Chon Blake PA-C        [START ON 10/31/2018] levothyroxine tablet 75 mcg  75 mcg Oral Early Morning Chon Blake PA-C        lidocaine (LIDODERM) 5 % patch 1 patch  1 patch Topical Once [place], DO   1 patch at 10/30/18 1907    loperamide (IMODIUM) capsule 2 mg  2 mg Oral 4x Daily PRN Chon Blake PA-C        metoprolol tartrate (LOPRESSOR) tablet 25 mg  25 mg Oral Q12H [address] Chon Blake PA-C         Allergies   Allergen Reactions    Ace Inhibitors     Celecoxib GI Intolerance    Haldol [Haloperidol]     Sulfa Antibiotics        OBJECTIVE:    Current Vitals:   Blood Pressure: (!) 180/80 (10/30/18 2141)  Pulse: 65 (10/30/18 2141)  Temperature: 98 8 °F (37 1 °C) (10/30/18 2141)  Temp Source: Temporal (10/30/18 2141)  Respirations: 16 (10/30/18 2141)  Weight - Scale: 50 2 kg (110 lb 10 7 oz) (10/30/18 1718)  SpO2: 96 % (10/30/18 2141)    No intake or output data in the 24 hours ending 10/30/18 2222    Invasive Devices     Peripheral Intravenous Line            Peripheral IV 10/30/18 Left Antecubital less than 1 day                  Physical Exam   Constitutional: Vital signs are normal  She appears well-developed  She appears cachectic  Non-toxic appearance  She does not have a sickly appearance  HENT:   Head: Normocephalic and atraumatic  Right Ear: External ear normal    Left Ear: External ear normal    Eyes: Pupils are equal, round, and reactive to light  EOM are normal  No scleral icterus  Neck: Neck supple  No thyromegaly present  Pulmonary/Chest: Effort normal and breath sounds normal  No respiratory distress  She has no wheezes  Abdominal: Soft  Bowel sounds are normal  She exhibits no distension  There is no tenderness  Musculoskeletal: She exhibits no edema  Neurological: She is alert  She is disoriented  She displays atrophy (generalized)  No cranial nerve deficit or sensory deficit  Psychiatric: She has a normal mood and affect  Her speech is normal and behavior is normal  Thought content normal  Cognition and memory are impaired  She exhibits abnormal recent memory  Lab Results:     Results from last 7 days  Lab Units 10/30/18  1855   WBC Thousand/uL 14 47*   HEMOGLOBIN g/dL 11 2*   HEMATOCRIT % 35 3   PLATELETS Thousands/uL 168        Results from last 7 days  Lab Units 10/30/18  1855   SODIUM mmol/L 143   POTASSIUM mmol/L 3 9   CHLORIDE mmol/L 104   CO2 mmol/L 30   BUN mg/dL 22   CREATININE mg/dL 0 74   CALCIUM mg/dL 9 3   ALK PHOS U/L 57   ALT U/L 18   AST U/L 19     Lab Results   Component Value Date    CKTOTAL 53 09/09/2015    TROPONINI <0 02 10/30/2018         Imaging:   XR Right Hip, Pelvis, Femur: Right intertrochanteric Fracture with comminution; Lytic lesion of midshaft femur noted most on lateral view and cortical erosion on AP view  CT BRAIN - WITHOUT CONTRAST: Right posterior parietal/occipital scalp  Hematoma  No acute intracranial abnormality    CT CERVICAL SPINE - WITHOUT CONTRAST:  No cervical spine fracture or traumatic malalignment  EKG: NSR with T-wave inversion in AVL    VTE Prophylaxis: Heparin    Code Status: Level 3    Counseling / Coordination of Care: Total floor / unit time spent today 45 minutes  Anticipated Length of Stay will be: MORE THAN 2 (TWO) Midnights:     Gail Multani PA-C    This note has been constructed using a voice recognition system

## 2018-10-31 NOTE — ANESTHESIA PREPROCEDURE EVALUATION
Review of Systems/Medical History    Chart reviewed      Cardiovascular  Exercise tolerance (METS): <4,  Hyperlipidemia, Hypertension , Past MI > 6 months, CAD , Angina with exertion,    Pulmonary       GI/Hepatic            Endo/Other  History of thyroid disease , hypothyroidism,      GYN       Hematology  Negative hematology ROS      Musculoskeletal  Negative musculoskeletal ROS        Neurology    TIA,   Comment: Hx subdural/memory loss Psychology   Anxiety, Depression ,              Physical Exam    Airway    Mallampati score: II  TM Distance: <3 FB  Neck ROM: full     Dental       Cardiovascular  Rhythm: regular, Rate: normal,     Pulmonary  Breath sounds clear to auscultation,     Other Findings        Anesthesia Plan  ASA Score- 3 Emergent    Anesthesia Type- general with ASA Monitors  Additional Monitors:   Airway Plan: ETT  Plan Factors-Patient not instructed to abstain from smoking on day of procedure  Patient did not smoke on day of surgery  Induction- intravenous  Postoperative Plan-     Informed Consent- Anesthetic plan and risks discussed with daughter

## 2018-10-31 NOTE — PROGRESS NOTES
Progress Note - Orthopedics   Malka Ilana 80 y o  female MRN: [id number]  Unit/Bed#: OR POOL Encounter: [id number]    Assessment:  80 y o  female s/p closed reduction internal fixation for right hip fracture  POD 0    Plan:  Pain control prn  PT/OT-WBAT right LE   Lovenox/TEDs/SCDs for DVT prophylaxis  Abx x 24h    Subjective: Pt S&E  Resting comfortably  Awakens to name but does not follow commands  Vitals: Blood pressure 140/62, pulse 105, temperature (!) 97 1 °F (36 2 °C), resp  rate 22, height 5' 3" (1 6 m), weight 50 2 kg (110 lb 10 7 oz), SpO2 96 %  ,Body mass index is 19 6 kg/m²        Intake/Output Summary (Last 24 hours) at 10/31/18 1939  Last data filed at 10/31/18 1857   Gross per 24 hour   Intake          3061 25 ml   Output              693 ml   Net          2368 25 ml       Invasive Devices     Peripheral Intravenous Line            Peripheral IV 10/31/18 Left Wrist less than 1 day    Peripheral IV 10/31/18 Right Forearm less than 1 day          Drain            Urethral Catheter Latex 16 Fr  less than 1 day                Ortho Exam: rightLE:  Drsg:  C/D/I, palpable pedal pulse    Lab, Imaging and other studies:   CBC:   Lab Results   Component Value Date    WBC 7 30 10/31/2018    HGB 10 0 (L) 10/31/2018    HCT 31 2 (L) 10/31/2018     (H) 10/31/2018     (L) 10/31/2018    MCH 32 5 10/31/2018    MCHC 32 1 10/31/2018    RDW 13 9 10/31/2018    MPV 9 7 10/31/2018

## 2018-10-31 NOTE — ASSESSMENT & PLAN NOTE
Normal ECG, no chest pain  Continue cardiac medications  Patient is intermediate risk for intermediate risk procedure, may proceed with surgery due to potential benefit

## 2018-10-31 NOTE — CONSULTS
Raoul Klein y o  female MRN: [id number]  Unit/Bed#: [facility] 2 -01      Chief Complaint:   right hip pain    HPI:   80 y  o female ambulates with walker status post unwitnessed mechanical fall complaining of right hip pain and inability to bear weight  Patient's daughter reports the patient was walking outside at her assisted living community when she had an unsupervised fall  The patient was found on the ground and brought to ED via EMS  Pain is well localized to the hip and is made worse with motion or contact to the area  Denies numbness or tingling  Patient and her daughter deny any prior injury to this hip         Review Of Systems:   · Skin: Normal  · Neuro: See HPI  · Musculoskeletal: See HPI  · 14 point review of systems negative except as stated above     Past Medical History:   Past Medical History:   Diagnosis Date    Abnormal CT scan, neck     Last Assessed:  5/28/14    Altered mental status 11/22/2017    Anxiety     Basal cell adenocarcinoma     Benign essential hypertension     Last Assessed:  1/14/13    Closed 2-part intertrochanteric fracture of proximal end of right femur ([facility] [address] ) 10/30/2018    Coronary artery disease     Dementia 2006    Depression     Diverticulosis     Essential hypertension 11/22/2017    Hyperlipidemia     Hypertension     Hypothyroid 11/22/2017    Hypothyroid     Insomnia     Long term (current) use of antithrombotics/antiplatelets     Last Assessed:  9/23/13    Lytic bone lesion of right femur 10/30/2018    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 ([facility] [facility] )     MI (myocardial infarction) ([facility] [address] )     Last Assessed:  6/11/15    Subdural hematoma, post-traumatic ([facility] [address] ) 2014    left sided, treated nonoperatively    Subdural hygroma 2014    s/p subdural hematoma    TIA (transient ischemic attack)     Unstable angina (HCC) 2015    Vertigo        Past Surgical History:   Past Surgical History:   Procedure Laterality Date    BREAST LUMPECTOMY Right     CATARACT EXTRACTION      CHOLECYSTECTOMY      COLONOSCOPY  2003    HYSTERECTOMY      Total abdominal with removal of both ovaries       Family History:  Family history reviewed and non-contributory  Family History   Problem Relation Age of Onset    Diabetes Mother     Lung cancer Mother     Colon cancer Father     Dementia Father     Prostate cancer Brother        Social History:  Social History     Social History    Marital status:      Spouse name: N/A    Number of children: N/A    Years of education: N/A     Social History Main Topics    Smoking status: Never Smoker    Smokeless tobacco: Never Used    Alcohol use No      Comment: Per Allscripts:  Social drinker    Drug use: No    Sexual activity: Not Asked     Other Topics Concern    None     Social History Narrative    Living in an assisted living facility       Allergies:    Allergies   Allergen Reactions    Ace Inhibitors     Celecoxib GI Intolerance    Haldol [Haloperidol]     Sulfa Antibiotics            Labs:    0  Lab Value Date/Time   HCT 31 2 (L) 10/31/2018 0453   HCT 35 3 10/30/2018 1855   HCT 39 0 11/21/2017 2355   HCT 35 8 06/02/2015 0555   HCT 36 2 06/01/2015 0455   HCT 39 6 05/31/2015 0939   HGB 10 0 (L) 10/31/2018 0453   HGB 11 2 (L) 10/30/2018 1855   HGB 12 9 11/21/2017 2355   HGB 11 8 06/02/2015 0555   HGB 12 1 06/01/2015 0455   HGB 13 2 05/31/2015 0939   INR 1 06 11/21/2017 2355   INR 1 11 05/31/2015 0939   WBC 7 30 10/31/2018 0453   WBC 14 47 (H) 10/30/2018 1855   WBC 7 37 11/21/2017 2355   WBC 7 27 06/02/2015 0555   WBC 9 04 06/01/2015 0455   WBC 6 56 05/31/2015 0939   ESR 36 (H) 03/11/2014 1404   CRP 9 6 (H) 03/11/2014 1404       Meds:    Current Facility-Administered Medications:     acetaminophen (TYLENOL) tablet 488 mg, 488 mg, Oral, Q6H PRN, Tara Mai PA-C    acetaminophen (TYLENOL) tablet 650 mg, 650 mg, Oral, Q6H PRN, Tara Mai PA-C, 650 mg at 10/30/18 2350    ALPRAZolam [facility]) tablet 0 25 mg, 0 25 mg, Oral, BID PRN, Beth Waters PA-C    aspirin chewable tablet 81 mg, 81 mg, Oral, Daily, Braxton Arriaga PA-C    divalproex sodium (DEPAKOTE) EC tablet 250 mg, 250 mg, Oral, HS, Beth Waters PA-C, 250 mg at 10/30/18 2315    donepezil (ARICEPT) tablet 10 mg, 10 mg, Oral, HS, Beth Waters PA-C, 10 mg at 10/30/18 2315    escitalopram (LEXAPRO) tablet 10 mg, 10 mg, Oral, Daily, Beth Waters PA-C    heparin (porcine) subcutaneous injection 5,000 Units, 5,000 Units, Subcutaneous, Q8H [facility], 5,000 Units at 10/31/18 0604 **AND** Platelet count, , , Once, Beth Waters PA-C    lactated ringers infusion, 75 mL/hr, Intravenous, Continuous, Beth Waters PA-C, Last Rate: 75 mL/hr at 10/30/18 2315, 75 mL/hr at 10/30/18 2315    levothyroxine tablet 75 mcg, 75 mcg, Oral, Early Morning, Beth Waters PA-C, 75 mcg at 10/31/18 0604    loperamide (IMODIUM) capsule 2 mg, 2 mg, Oral, 4x Daily PRN, Beth Waters PA-C    metoprolol tartrate (LOPRESSOR) tablet 25 mg, 25 mg, Oral, Q12H [facility], Beth Waters PA-C, 25 mg at 10/30/18 2315    Blood Culture:   No results found for: BLOODCX    Wound Culture:   No results found for: WOUNDCULT    Ins and Outs:  I/O last 24 hours: In: -   Out: 89 [Urine:89]          Physical Exam:   /66 (BP Location: Left arm)   Pulse 63   Temp 98 7 °F (37 1 °C) (Temporal)   Resp 19   Wt 50 2 kg (110 lb 10 7 oz)   SpO2 93%   BMI 21 26 kg/m²   Gen: Alert and oriented to person, place, time  HEENT: EOMI, eyes clear, moist mucus membranes, hearing intact  Respiratory: Bilateral chest rise  No audible wheezing found  Cardiovascular: Regular Rate and Rhythm  Abdomen: soft nontender/nondistended  Musculoskeletal: right lower extremity  · Skin intact, limb shortened and externally rotated  · Tender to palpation over hip  · Positive log roll  · Sensation intact L1-S1  · Positive ankle dorsi/plantar flexion, EHL/FHL  · 2+ DP pulse  · Toes warm and well perfused  Radiology:   I personally reviewed the films  X-rays right hip shows Intertrochanteric femur fracture    Assessment:  89 y  o female status post unwitnessed mechanical fall with rightIntertrochanteric femur fracture    Plan:   · Non weight bearing right lower extremity  · Analgesics for pain  · NPO   · To OR for IM nail femur fracture of Intertrochanteric femur fracture pending SLIM clearance     · PT/OT post-op  · DVT ppx will begin post-op   · Dispo: Ortho will follow    Belinda Dalton PA-C

## 2018-10-31 NOTE — PROGRESS NOTES
Pt reported no concerns w/ chewing or swallowing pta, however pending ST consult  Pt usually eats oatmeal, fruit or eggs in the morning, grilled cheese and soup for L and loves fish, vegetables, fruit for D  Per daughter pt has always been a healthy eater, but noticed wt loss past 6 months to 1yr, feels possibly eating less  Pt's daughter reported pt still attends excercise classes at [facility]  Wt hx records: 9/27/17 127lbs, 11/21/17 127lbs, 6/6/18 116lbs, 4/25/18 113lbs, 8/3 102lbs, 10/17 105lbs, 10/30 110lbs, although pt showing wt loss, it is not significant 13% wt loss x 11 months, 5% x 4 months, appears pt has been regaining some wt past month, no edema documented  Pt has hanging skin at triceps and mild depletion of temporals, but pt is 89yrs old  At this time do not feel pt meets criteria for malnutrition, but will continue to monitor  Once able for diet advancement, rec Regular w/ ensure compact in between meals for additional pro, kimberly to prevent any further wt loss

## 2018-10-31 NOTE — MEDICAL STUDENT
Progress Note - Lizette Morning 80 y o  female MRN: [id number]    Unit/Bed#: [place] 2 -01 Encounter: [id number]      Assessment / Plan:    Closed 2-part intertrochanteric fracture of proximal end of right femur (HCC)    Lytic bone lesion of right femur  This is likely related to a mechanical fall  Orthopedic surgery has been consulted an planning on surgical repair of femur, appreciate input    -Continue Tylenol as needed   -Continue morphine as needed    -Plan for PT / OT post-op  -May need short term rehab post-op  Benign essential hypertension  -Continue metoprolol    -Continue as needed hydralazine  Hypothyroidism  -Continue levothyroxine  Generalized anxiety disorder  -Continue escitalopram   -Continue alprazolam   -Continue depakote  Dementia without behavioral disturbance  She is oriented to self and city, but is not oriented to time or situation  Patient believed that she already had surgery to fix her current femur fracture     -Continue donepezil  Ambulatory dysfunction  She uses a walker at baseline  She admits that at times she forgets her walker    -Will need PT / OT post-op  Coronary artery disease  No current signs / symptoms or EKG evidence of ischemia    -Continue aspirin   -Continue metoprolol  Mild cognitive impairment  This is related to her dementia  Hx of non-ST elevation myocardial infarction (NSTEMI)  There is no current evidence of ischemia  History of subdural hemorrhage  This is historical from 2014, as a result of a fall backwards in 05/2014  This was noted to be resolved 08/2014  Hematoma of parietal scalp  This is likely a result of her recent fall  There is no intracranial hemorrhage or hematoma noted per radiologist read  No surgical intervention required  Subjective:   She states that she is feeling well, but her hip hurts when she tries to move her right leg    She denies pain at rest, also denies chest pain, shortness of breath, nausea / vomiting, and lightheadedness  Objective:     Vitals: Blood pressure 163/66, pulse 63, temperature 98 7 °F (37 1 °C), temperature source Temporal, resp  rate 19, height 5' 3" (1 6 m), weight 50 2 kg (110 lb 10 7 oz), SpO2 93 %  ,Body mass index is 19 6 kg/m²  Lab Results   Component Value Date    WBC 7 30 10/31/2018    HGB 10 0 (L) 10/31/2018    HCT 31 2 (L) 10/31/2018     (H) 10/31/2018     (L) 10/31/2018     Lab Results   Component Value Date     10/31/2018    K 3 8 10/31/2018     10/31/2018    CO2 31 10/31/2018    ANIONGAP 5 09/09/2015    BUN 20 10/31/2018    CREATININE 0 68 10/31/2018    GLUCOSE 103 09/09/2015    GLUF 118 (H) 09/27/2017    CALCIUM 8 8 10/31/2018    AST 19 10/30/2018    ALT 18 10/30/2018    ALKPHOS 57 10/30/2018    PROT 7 7 09/09/2015    BILITOT 0 76 09/09/2015    EGFR 75 10/31/2018       I/O last 3 completed shifts:  In: -   Out: 89 [Urine:89]    Physical Exam   Constitutional: She is oriented to person, place, and time  Vital signs are normal  She appears well-developed and well-nourished  She is cooperative  She is easily aroused  No distress  HENT:   Head: Normocephalic and atraumatic  Right Ear: External ear normal    Left Ear: External ear normal    Nose: Nose normal    Mouth/Throat: Oropharynx is clear and moist    Eyes: Pupils are equal, round, and reactive to light  Conjunctivae and EOM are normal    Neck: Normal range of motion  Neck supple  No thyromegaly present  Cardiovascular: Normal rate, regular rhythm and intact distal pulses  Exam reveals no gallop and no friction rub  Murmur heard  Diastolic murmur is present   Pulses:       Radial pulses are 2+ on the right side, and 2+ on the left side  Dorsalis pedis pulses are 2+ on the right side, and 2+ on the left side  Posterior tibial pulses are 2+ on the right side  Pulmonary/Chest: Effort normal and breath sounds normal    Abdominal: Soft  Bowel sounds are normal    Musculoskeletal:        Right hip: She exhibits decreased range of motion, decreased strength and bony tenderness  Neurological: She is alert, oriented to person, place, and time and easily aroused  Skin: Skin is warm  Capillary refill takes less than 2 seconds  No rash noted  No erythema  There is pallor  Psychiatric: She has a normal mood and affect  Her behavior is normal  Judgment and thought content normal        Invasive Devices     Peripheral Intravenous Line            Peripheral IV 10/31/18 Right Forearm less than 1 day                Lab, Imaging and other studies: I have personally reviewed pertinent reports      VTE Pharmacologic Prophylaxis: Heparin  VTE Mechanical Prophylaxis: sequential compression device    Gómez Dia, RN  Student Nurse Practitioner

## 2018-10-31 NOTE — PLAN OF CARE
DISCHARGE PLANNING     Discharge to home or other facility with appropriate resources Progressing        INFECTION - ADULT     Absence or prevention of progression during hospitalization Progressing     Absence of fever/infection during neutropenic period Progressing        Knowledge Deficit     Patient/family/caregiver demonstrates understanding of disease process, treatment plan, medications, and discharge instructions Progressing        MUSCULOSKELETAL - ADULT     Maintain or return mobility to safest level of function Progressing     Maintain proper alignment of affected body part Progressing        PAIN - ADULT     Verbalizes/displays adequate comfort level or baseline comfort level Progressing        Potential for Falls     Patient will remain free of falls Progressing        SAFETY ADULT     Maintain or return to baseline ADL function Progressing     Maintain or return mobility status to optimal level Progressing

## 2018-10-31 NOTE — PLAN OF CARE
DISCHARGE PLANNING     Discharge to home or other facility with appropriate resources Progressing        INFECTION - ADULT     Absence or prevention of progression during hospitalization Progressing     Absence of fever/infection during neutropenic period Progressing        Knowledge Deficit     Patient/family/caregiver demonstrates understanding of disease process, treatment plan, medications, and discharge instructions Progressing        MUSCULOSKELETAL - ADULT     Maintain or return mobility to safest level of function Progressing     Maintain proper alignment of affected body part Progressing        Nutrition/Hydration-ADULT     Nutrient/Hydration intake appropriate for improving, restoring or maintaining nutritional needs Progressing        PAIN - ADULT     Verbalizes/displays adequate comfort level or baseline comfort level Progressing        Potential for Falls     Patient will remain free of falls Progressing        Prexisting or High Potential for Compromised Skin Integrity     Skin integrity is maintained or improved Progressing        SAFETY ADULT     Maintain or return to baseline ADL function Progressing     Maintain or return mobility status to optimal level Progressing

## 2018-10-31 NOTE — ASSESSMENT & PLAN NOTE
· Will make NPO and consult Orthopedics  · Patient's ambulatory status is with a walker although she will frequently ambulate without a walker  · Was living at assisted living facility due to need for close monitoring/administration of medication regimens and will need rehab following hip fracture treatment

## 2018-10-31 NOTE — ASSESSMENT & PLAN NOTE
· Lateral femur film on hip series shows large lytic area of bone and subsequent AP views of the femur Show cortical thinning at the distal aspect within the available imaging  · Daughters indicate a 30 lb-40 lb weight loss over the past year  · Patient had what sounds like a lymph node excision followed by chemotherapy and radiation in the 1990s and was unclear if this was a long or breast malignancy    · Will await Orthopedics evaluation and discussion with family for desires regarding further workup

## 2018-10-31 NOTE — ASSESSMENT & PLAN NOTE
"Right posterior parietal/occipital scalp hematoma" per CT head wo contrast s/p fall  Previously reported subdural hematoma not identified  Serial exams

## 2018-10-31 NOTE — ASSESSMENT & PLAN NOTE
· Patient ambulates with a walker at baseline but is independent with all other ADLs  · Patient will occasionally forget to take her walker with her at times creating a fall risk

## 2018-10-31 NOTE — ASSESSMENT & PLAN NOTE
· Mildly elevated but mostly acceptable for geriatric age  · Continue Lopressor  · Continue hydralazine p r n

## 2018-10-31 NOTE — PROGRESS NOTES
Progress Note - Monty Nguyen 9/26/1923, 80 y o  female MRN: [id number]    Unit/Bed#: [place] [address] -01 Encounter: [id number]    Primary Care Provider: Kelli Todd DO   Date and time admitted to hospital: 10/30/2018  5:11 PM        * Closed 2-part intertrochanteric fracture of proximal end of right femur ([address] )   Assessment & Plan    · Appears to be due to mechanical fall - discussed with Orthopedic Surgery  · Patient NPO in anticipation of surgical repair  · Patient's ambulatory status is with a walker although she will frequently ambulate without a walker  · Was living at assisted living facility due to need for close monitoring/administration of medication regimens and will need rehab following hip fracture treatment     Lytic bone lesion of right femur   Assessment & Plan    · Per H&P, lateral femur film on hip series shows large lytic area of bone and subsequent AP views of the femur Show cortical thinning at the distal aspect within the available imaging  · I did not find evidence of a lytic lesion on current imaging  · Discussed with orthopedic surgery and it is felt current fracture is related to mechanical fall and not necessarily to the described lesion     Ambulatory dysfunction   Assessment & Plan    · Patient ambulates with a walker at baseline but is independent with all other ADLs  · Patient will occasionally forget to take her walker with her at times creating a fall risk     Benign essential hypertension   Assessment & Plan    · Mildly elevated but mostly acceptable for geriatric age  · Continue Lopressor  · Continue hydralazine p r n       Coronary artery disease   Assessment & Plan    Normal ECG, no chest pain  Continue cardiac medications  Patient is intermediate risk for intermediate risk procedure, may proceed with surgery due to potential benefit     Dementia without behavioral disturbance   Assessment & Plan    Patient is able to carry out ADLs at assisted living  She is normally well oriented to situation in her home environement  On my encounter she is oriented to self and re-orientable to type of facility, she is oriented to town and state, to month but not year  Continue Aricept     Hematoma of parietal scalp   Assessment & Plan    "Right posterior parietal/occipital scalp hematoma" per CT head wo contrast s/p fall  Previously reported subdural hematoma not identified  Serial exams     Hx of non-ST elevation myocardial infarction (NSTEMI)   Assessment & Plan    · Continue ASA 81mg  · Echocardiogram 2017 revealed LVEF:  55%-65% with mild aortic regurgitation, mild tricuspid regurgitation, mild pulmonic valve regurgitation  · NSTEMI May 2014, recurrent chest pain episodes May 2015 with a peak troponin of 1 5 and 3 hospitalizations for chest pain in June 2015 with a peak troponin of 0 76  · Patient currently chest pain free and with a normal ECG, she can proceed with hip repair surgery     History of subdural hemorrhage   Assessment & Plan    · May 2014  · Managed medically  · Not identified on current CT of the brain     Generalized anxiety disorder   Assessment & Plan    · Continue Lexapro, Depakote q h s , Xanax     Hypothyroidism   Assessment & Plan    · Continue levothyroxine       VTE Pharmacologic Prophylaxis:   Pharmacologic: Heparin  Mechanical VTE Prophylaxis in Place: Yes    Patient Centered Rounds: I have performed bedside rounds with nursing staff today  Discussions with Specialists or Other Care Team Provider: Orthopedic surgery    Education and Discussions with Family / Patient: Patient's daughter present at the bedside    Time Spent for Care: 45 minutes  More than 50% of total time spent on counseling and coordination of care as described above      Current Length of Stay: 1 day(s)    Current Patient Status: Inpatient   Certification Statement: The patient will continue to require additional inpatient hospital stay due to need for surgical procedure, close monitoring    Discharge Plan: TBD    Code Status: Level 3 - DNAR and DNI      Subjective:   Patient seen and examined  She is alert and oriented to self and type of place with reorientation, to month but not to year  She appears to be in no acute distress  She states that her pain is currently controlled  She denies chest pain, shortness of breath or palpitations  Prior to her fall the patient states she has been able to ambulate without shortness of breath  Objective:     Vitals:   Temp (24hrs), Av 6 °F (37 °C), Min:98 2 °F (36 8 °C), Max:98 8 °F (37 1 °C)    Temp:  [98 2 °F (36 8 °C)-98 8 °F (37 1 °C)] 98 7 °F (37 1 °C)  HR:  [55-65] 63  Resp:  [13-20] 19  BP: (115-185)/(44-80) 163/66  SpO2:  [93 %-98 %] 93 %  Body mass index is 19 6 kg/m²  Input and Output Summary (last 24 hours): Intake/Output Summary (Last 24 hours) at 10/31/18 1034  Last data filed at 10/31/18 0514   Gross per 24 hour   Intake                0 ml   Output               89 ml   Net              -89 ml       Physical Exam:     Physical Exam   Constitutional: No distress  Thin   HENT:   Head: Normocephalic and atraumatic  Eyes: Conjunctivae are normal    Neck: No JVD present  Cardiovascular: Normal rate and regular rhythm  No murmur heard  Pulmonary/Chest: Effort normal  No respiratory distress  She has no wheezes  She has no rales  Abdominal: Soft  She exhibits no distension  There is no tenderness  There is no guarding  Musculoskeletal: She exhibits no edema  Neurological: She is alert  Skin: Skin is warm and dry  Psychiatric: Cognition and memory are impaired         Additional Data:     Labs:      Results from last 7 days  Lab Units 10/31/18  0453 10/30/18  1855   WBC Thousand/uL 7 30 14 47*   HEMOGLOBIN g/dL 10 0* 11 2*   HEMATOCRIT % 31 2* 35 3   PLATELETS Thousands/uL 133* 168   NEUTROS PCT %  --  86*   LYMPHS PCT %  --  8*   MONOS PCT %  --  6   EOS PCT %  --  0       Results from last 7 days  Lab Units 10/31/18  0453 10/30/18  1855   SODIUM mmol/L 143 143   POTASSIUM mmol/L 3 8 3 9   CHLORIDE mmol/L 106 104   CO2 mmol/L 31 30   BUN mg/dL 20 22   CREATININE mg/dL 0 68 0 74   ANION GAP mmol/L 6 9   CALCIUM mg/dL 8 8 9 3   ALBUMIN g/dL  --  3 5   TOTAL BILIRUBIN mg/dL  --  0 40   ALK PHOS U/L  --  57   ALT U/L  --  18   AST U/L  --  19                       * I Have Reviewed All Lab Data Listed Above  * Additional Pertinent Lab Tests Reviewed: [address] Admission Reviewed    Imaging:    Imaging Reports Reviewed Today Include: Xrays hip, CT head and cervical spine    Recent Cultures (last 7 days):           Last 24 Hours Medication List:     Current Facility-Administered Medications:  acetaminophen 488 mg Oral Q6H PRN Diania Mario, PA-C    acetaminophen 650 mg Oral Q6H PRN Diania Mario, PA-C    ALPRAZolam 0 25 mg Oral BID PRN Diania Mario, PA-C    aspirin 81 mg Oral Daily Diania Mario, PA-C    divalproex sodium 250 mg Oral HS Diania Mario, PA-C    donepezil 10 mg Oral HS Diania Mario, PA-C    escitalopram 10 mg Oral Daily Diania Mario, PA-C    heparin (porcine) 5,000 Units Subcutaneous [facility] Diania Mario, PA-C    lactated ringers 75 mL/hr Intravenous Continuous Diania Mario, PA-C Last Rate: 75 mL/hr (10/30/18 2315)   levothyroxine 75 mcg Oral Early Morning Diania Mario, PA-C    loperamide 2 mg Oral 4x Daily PRN Diania Mario, PA-C    metoprolol tartrate 25 mg Oral Q12H [address] Diania Mario, PA-C         Today, Patient Was Seen By: Rachael Culver MD    ** Please Note: Dictation voice to text software may have been used in the creation of this document   **

## 2018-10-31 NOTE — PLAN OF CARE
Problem: SLP ADULT - COMMUNICATION, IMPAIRED  Goal: Initial communication eval performed  Outcome: Completed Date Met: 10/31/18  Speech and language skills are within functional limits  No speech therapy services warranted   Please re-consult for swallowing once cleared by MD

## 2018-10-31 NOTE — SOCIAL WORK
CM reached out to dtr to discuss pt's situation as pt has questionable mentation  Dtr reports pt is in [facility] at [facility], and she will return there at d/c if she doesn't go to rehab  She reported to CM that pt has taken delight at eloping outside of the facility and walking the grounds without informing anyone, and that they even had signed her up for the program [facility] there, that cost them an extra $400-500 extra a month for her, to be involved in more activities and more frequent monitoring  She still managed to get outside and fall with no one around her  Dtr will be keeping in touch with CM for d/c plan

## 2018-10-31 NOTE — UTILIZATION REVIEW
Initial Clinical Review    Admission: Date/Time/Statement: 10/30/18 @ 2002     Orders Placed This Encounter   Procedures    Inpatient Admission     Standing Status:   Standing     Number of Occurrences:   1     Order Specific Question:   Admitting Physician     Answer:   Nahomy Tierney [[id number]]     Order Specific Question:   Level of Care     Answer:   Med Surg [16]     Order Specific Question:   Estimated length of stay     Answer:   More than 2 Midnights     Order Specific Question:   Certification     Answer:   I certify that inpatient services are medically necessary for this patient for a duration of greater than two midnights  See H&P and MD Progress Notes for additional information about the patient's course of treatment  ED: Date/Time/Mode of Arrival:   ED Arrival Information     Expected Arrival Acuity Means of Arrival Escorted By Service Admission Type    - 10/30/2018 17:08 Urgent Ambulance [address] [facility] Urgent    Arrival Complaint    Fall        Chief Complaint:   Chief Complaint   Patient presents with    Leg Pain     Out in yard walking with walker, twisted right leg and fell  Pt now having right hip pain that radiates down leg     History of Illness:  40-year-old female with a past medical history significant for prior subdural hematoma, hypertension, hyperlipidemia, CAD, dementia who presents after an unwitnessed fall at nursing facility  Patient was walking with her walker outside and reports that there was grass that was too high for her to clear with her walker resulting in her falling onto right side  Denies LOC  Per nursing home staff, the patient could have been outside for approximately maximum of 25 min  Patient complains of pain in her right hip since the fall  Describes it as sharp, constant, 4/10 intensity, worse with attempt at movement  Complains of chronic neck pain  Denies numbness, tingling, weakness, loss of bowel or bladder control, back pain  Per family who is at bedside, patient is currently at her baseline mentation  ED Vital Signs:   ED Triage Vitals [10/30/18 1718]   Temperature Pulse Respirations Blood Pressure SpO2   98 5 °F (36 9 °C) 55 14 (!) 185/56 94 %      Temp Source Heart Rate Source Patient Position - Orthostatic VS BP Location FiO2 (%)   Oral Monitor Lying Right arm --      Pain Score       4        Wt Readings from Last 1 Encounters:   10/30/18 50 2 kg (110 lb 10 7 oz)       Abnormal Labs/Diagnostic Test Results:   WBC's 14 47,   anc 12 28  Hg 11 2  Glu 144  CXR: No acute cardiopulmonary disease   Cardiomegaly  R Hip/Pelvis: Impacted intertrochanteric right hip fracture   Degenerative changes in both hips, right more than left  Right Femur: Intertrochanteric fracture in the proximal femur again identified  CT Head: Right posterior parietal/occipital scalp hematoma  No acute intracranial abnormality  CT C Spine: No cervical spine fracture or traumatic malalignment    EKG: Normal sinus rhythm    ED Treatment:   Medication Administration from 10/30/2018 1708 to 10/30/2018 2127       Date/Time Order Dose Route Action Action by Comments     10/30/2018 1907 lidocaine (LIDODERM) 5 % patch 1 patch 1 patch Topical Medication Applied       10/30/2018 1907 acetaminophen (TYLENOL) tablet 650 mg 650 mg Oral Given            Past Medical/Surgical History:   Past Medical History:   Diagnosis Date    Abnormal CT scan, neck     Altered mental status 11/22/2017    Anxiety     Basal cell adenocarcinoma     Benign essential hypertension     Closed 2-part intertrochanteric fracture of proximal end of right femur ([address] ) 10/30/2018    Coronary artery disease     Dementia 2006    Depression     Diverticulosis     Essential hypertension 11/22/2017    Hyperlipidemia     Hypertension     Hypothyroid 11/22/2017    Hypothyroid     Insomnia     Long term (current) use of antithrombotics/antiplatelets     Lytic bone lesion of right femur 10/30/2018    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     MI (myocardial infarction) [facility] [address] [facility]     Subdural hematoma, post-traumatic ([facility] [address] [facility] ) 2014    Subdural hygroma 2014    TIA (transient ischemic attack)     Unstable angina ([facility]) 2015    Vertigo        Admitting Diagnosis: Femur fracture ([facility]) [S72 90XA]  Leg pain [M79 606]  Fall from standing, initial encounter [W19  XXXA]    Age/Sex: 80 y o  female    Assessment/Plan:  81 yo female admitted with   Closed 2-part intertrochanteric fracture of proximal end of right femur ([facility] [address] )   Assessment & Plan     · Will make NPO and consult Orthopedics  · Patient's ambulatory status is with a walker although she will frequently ambulate without a walker  · Was living at assisted living facility due to need for close monitoring/administration of medication regimens and will need rehab following hip fracture treatment      Lytic bone lesion of right femur   Assessment & Plan     · Lateral femur film on hip series shows large lytic area of bone and subsequent AP views of the femur Show cortical thinning at the distal aspect within the available imaging  · Daughters indicate a 30 lb-40 lb weight loss over the past year  · Patient had what sounds like a lymph node excision followed by chemotherapy and radiation in the 1990s and was unclear if this was a long or breast malignancy    · Will await Orthopedics evaluation and discussion with family for desires regarding further workup      History of subdural hemorrhage   Assessment & Plan     · May 2014  · Managed medically      Hx of non-ST elevation myocardial infarction (NSTEMI)   Assessment & Plan     · Continue ASA 81mg  · Echocardiogram 2017 revealed LVEF:  55%-65% with mild aortic regurgitation, mild tricuspid regurgitation, mild pulmonic valve regurgitation  · NSTEMI May 2014, recurrent chest pain episodes May 2015 with a peak troponin of 1 5 and 3 hospitalizations for chest pain in June 2015 with a peak troponin of 0 76      Ambulatory dysfunction   Assessment & Plan     · Patient ambulates with a walker at baseline but is independent with all other ADLs  · Patient will occasionally forget to take her walker with her at times creating a fall risk      Mild cognitive impairment   Assessment & Plan     · Continue Aricept      Generalized anxiety disorder   Assessment & Plan     · Continue Lexapro, Depakote q h s , Xanax      Hypothyroidism   Assessment & Plan     · Continue levothyroxine      Benign essential hypertension   Assessment & Plan     · Continue Lopressor  · Will add hydralazine p r n              Admission Orders:  IP  Consult Ortho  NPO - sips with meds  CBC, BMP in am  SCD's  EKG  Speech Eval    Scheduled Meds:   Current Facility-Administered Medications:  acetaminophen 488 mg Oral Q6H PRN Beth Waters, PA-C    acetaminophen 650 mg Oral Q6H PRN FRANSISCO Mercado-KENA    ALPRAZolam 0 25 mg Oral BID PRN Beth Waters, PA-C    aspirin 81 mg Oral Daily Braxton Arriaga, PA-C    divalproex sodium 250 mg Oral HS Beth Waters, PA-C    donepezil 10 mg Oral HS Beth Waters, PA-C    escitalopram 10 mg Oral Daily Beth Waters, PA-C    heparin (porcine) 5,000 Units Subcutaneous [facility] Beth Waters, PA-KENA            levothyroxine 75 mcg Oral Early Morning FRANSISCO Mercado-KENA    loperamide 2 mg Oral 4x Daily PRN Beth Waters, PA-KENA    metoprolol tartrate 25 mg Oral Q12H [facility] Beth Waters PA-C    morphine injection 1 mg Intravenous Q4H PRN Shelby Paredes MD    morphine injection 2 mg Intravenous Q4H PRN Shelby Paredes MD      Continuous Infusions:   lactated ringers 75 mL/hr Last Rate: 75 mL/hr (10/30/18 0622)     PRN Meds:   acetaminophen    acetaminophen    ALPRAZolam    loperamide    morphine injection    morphine injection    Plan OR  10/31    Thank you,  Shaye Henry Utilization Review Department  Phone: [phone];  Fax [phone]  ATTENTION: Please call with any questions or concerns to [phone]  and carefully follow the prompts so that you are directed to the right person  Send all requests for admission clinical reviews, approved or denied determinations and any other requests to fax [phone]   All voicemails are confidential

## 2018-10-31 NOTE — ASSESSMENT & PLAN NOTE
· Appears to be due to mechanical fall - discussed with Orthopedic Surgery  · Patient NPO in anticipation of surgical repair  · Patient's ambulatory status is with a walker although she will frequently ambulate without a walker  · Was living at assisted living facility due to need for close monitoring/administration of medication regimens and will need rehab following hip fracture treatment

## 2018-10-31 NOTE — ASSESSMENT & PLAN NOTE
Patient is able to carry out ADLs at assisted living  She is normally well oriented to situation in her home environement  On my encounter she is oriented to self and re-orientable to type of facility, she is oriented to town and state, to month but not year  Continue Aricept

## 2018-10-31 NOTE — ASSESSMENT & PLAN NOTE
· Per H&P, lateral femur film on hip series shows large lytic area of bone and subsequent AP views of the femur Show cortical thinning at the distal aspect within the available imaging  · I did not find evidence of a lytic lesion on current imaging  · Discussed with orthopedic surgery and it is felt current fracture is related to mechanical fall and not necessarily to the described lesion

## 2018-10-31 NOTE — ASSESSMENT & PLAN NOTE
Patient walks with walker at baseline due to balance problems  She reports a mechanical fall  Will need PT/OT after planned hip repair surgery

## 2018-10-31 NOTE — OP NOTE
OPERATIVE REPORT  PATIENT NAME: Crystal De Jesus    :  1923  MRN: [id number]  Pt Location: [facility] OR ROOM 03    SURGERY DATE: 10/31/2018    Surgeon(s) and Role:     Andreia Rader, DO - Primary     * Belinda Dalton PA-C - Assisting    Preop Diagnosis:  Closed 2-part intertrochanteric fracture of right femur, initial encounter ([place] [address] ) [place] Gregory    Post-Op Diagnosis Codes:     * Closed 2-part intertrochanteric fracture of right femur, initial encounter ([place] [address] ) [S72 141A]    Procedure(s) (LRB):  INSERTION NAIL IM FEMUR ANTEGRADE (TROCHANTERIC) (Right)    Specimen(s):  * No specimens in log *    Estimated Blood Loss:   300 mL    Drains:  Urethral Catheter Latex 16 Fr  (Active)   Number of days: 0       Anesthesia Type:   General w/ Regional    Operative Indications:  Closed 2-part intertrochanteric fracture of right femur, initial encounter ([place] [address] ) [S72 141A]      Operative Findings:  See below    Complications:   None    Implants:   Synthes  x 11mm, 130 degree  95mm femoral screw  36mm 5 0 cortical screw    Procedure and Technique:  INDICATIONS FOR PROCEDURE:  The patients is a 80 y o  female who presented to the ER after a fall c/o right  hip pain  The patient and the XRs were evaluated  Given the nature of the fracture and the risks associated with nonoperative management, surgery was recommended  Extensive counseling in regards to the reasons for surgical intervention as well as the risks and benefits of surgery were reviewed  The risks include, but are not limited to infection, wound healing problems, blood loss, blood clots, malunion, nonunion, failure of hardware, persistent pain and stiffness, damage to blood vessels and nerves, need for additional surgery, heart attack, stroke, death  The patient and her daughters understood and agreed to by oral and written consent  OPERATIVE PROCEDURE:  The patient was identified as Akil Lazo by her ID bracelet by the surgical staff in the preoperative area at [address] [facility]   The patient was wheeled back to the surgical room  Anesthesia was administered without complications  Preoperative antibiotics were given  The pt was placed on the operative table  The patient remained in the supine position and all bony prominences were carefully protected  The right leg was then prepped and draped in the usual sterile fashion  A timeout was performed where the patients name and surgical site were once again identified  Using XR the incision was marked out  A skin incision was made over the lateral aspect of the thigh just proximal to the greater trochanter  The tip of the greater trochanter was palpated and a guide wire was placed  A reamer was used to open the proximal femur  A 11 x 170 mm nail was placed  An incision was made over the lateral aspect of the femur and a guide wire was placed into the femoral head  The wire was measured and the lateral cortex was opened  The femoral head was reamed  The femoral head screw was placed and locked into place  A cortical screw was then placed distally  Final XRs were taken  The incisions were copiously irrigated with saline solution  The subcutaneous tissue and skin were closed with vicryl suture and staples  A sterile dressing was placed  The patient was transferred to the hospital bed and then extubated  The patient was transferred to the recovery room in stable condition  I attest that I was present and performed this procedure  Miles Rodríguez PA-C  was present for the entire procedure and provided essential assistance with limb position, patient prepping, and retraction      Patient Disposition:  extubated and stable    SIGNATURE: Radha Bennett DO  DATE: October 31, 2018  TIME: 7:09 PM

## 2018-10-31 NOTE — ASSESSMENT & PLAN NOTE
· Continue ASA 81mg  · Echocardiogram 2017 revealed LVEF:  55%-65% with mild aortic regurgitation, mild tricuspid regurgitation, mild pulmonic valve regurgitation  · NSTEMI May 2014, recurrent chest pain episodes May 2015 with a peak troponin of 1 5 and 3 hospitalizations for chest pain in June 2015 with a peak troponin of 0 76

## 2018-11-01 PROBLEM — I95.81 POSTPROCEDURAL HYPOTENSION: Status: ACTIVE | Noted: 2018-11-01

## 2018-11-01 PROBLEM — D62 ACUTE BLOOD LOSS ANEMIA: Status: ACTIVE | Noted: 2018-11-01

## 2018-11-01 LAB
ABO GROUP BLD BPU: NORMAL
ABO GROUP BLD: NORMAL
ANION GAP SERPL CALCULATED.3IONS-SCNC: 6 MMOL/L (ref 4–13)
BASOPHILS # BLD AUTO: 0.01 THOUSANDS/ΜL (ref 0–0.1)
BASOPHILS NFR BLD AUTO: 0 % (ref 0–1)
BLD GP AB SCN SERPL QL: NEGATIVE
BPU ID: NORMAL
BUN SERPL-MCNC: 24 MG/DL (ref 5–25)
CALCIUM SERPL-MCNC: 8 MG/DL (ref 8.3–10.1)
CHLORIDE SERPL-SCNC: 108 MMOL/L (ref 100–108)
CO2 SERPL-SCNC: 30 MMOL/L (ref 21–32)
CREAT SERPL-MCNC: 1.16 MG/DL (ref 0.6–1.3)
CROSSMATCH: NORMAL
EOSINOPHIL # BLD AUTO: 0 THOUSAND/ΜL (ref 0–0.61)
EOSINOPHIL NFR BLD AUTO: 0 % (ref 0–6)
ERYTHROCYTE [DISTWIDTH] IN BLOOD BY AUTOMATED COUNT: 13.8 % (ref 11.6–15.1)
GFR SERPL CREATININE-BSD FRML MDRD: 40 ML/MIN/1.73SQ M
GLUCOSE SERPL-MCNC: 142 MG/DL (ref 65–140)
HCT VFR BLD AUTO: 21.9 % (ref 34.8–46.1)
HGB BLD-MCNC: 6.9 G/DL (ref 11.5–15.4)
IMM GRANULOCYTES # BLD AUTO: 0.06 THOUSAND/UL (ref 0–0.2)
IMM GRANULOCYTES NFR BLD AUTO: 1 % (ref 0–2)
LYMPHOCYTES # BLD AUTO: 1.01 THOUSANDS/ΜL (ref 0.6–4.47)
LYMPHOCYTES NFR BLD AUTO: 8 % (ref 14–44)
MCH RBC QN AUTO: 33 PG (ref 26.8–34.3)
MCHC RBC AUTO-ENTMCNC: 31.5 G/DL (ref 31.4–37.4)
MCV RBC AUTO: 105 FL (ref 82–98)
MONOCYTES # BLD AUTO: 1.19 THOUSAND/ΜL (ref 0.17–1.22)
MONOCYTES NFR BLD AUTO: 9 % (ref 4–12)
NEUTROPHILS # BLD AUTO: 11 THOUSANDS/ΜL (ref 1.85–7.62)
NEUTS SEG NFR BLD AUTO: 82 % (ref 43–75)
NRBC BLD AUTO-RTO: 0 /100 WBCS
PLATELET # BLD AUTO: 114 THOUSANDS/UL (ref 149–390)
PMV BLD AUTO: 10.3 FL (ref 8.9–12.7)
POTASSIUM SERPL-SCNC: 4.5 MMOL/L (ref 3.5–5.3)
RBC # BLD AUTO: 2.09 MILLION/UL (ref 3.81–5.12)
RH BLD: POSITIVE
SODIUM SERPL-SCNC: 144 MMOL/L (ref 136–145)
SPECIMEN EXPIRATION DATE: NORMAL
UNIT DISPENSE STATUS: NORMAL
UNIT PRODUCT CODE: NORMAL
UNIT RH: NORMAL
WBC # BLD AUTO: 13.27 THOUSAND/UL (ref 4.31–10.16)

## 2018-11-01 PROCEDURE — 85025 COMPLETE CBC W/AUTO DIFF WBC: CPT | Performed by: PHYSICIAN ASSISTANT

## 2018-11-01 PROCEDURE — 80048 BASIC METABOLIC PNL TOTAL CA: CPT | Performed by: PHYSICIAN ASSISTANT

## 2018-11-01 PROCEDURE — P9021 RED BLOOD CELLS UNIT: HCPCS

## 2018-11-01 PROCEDURE — 30233N1 TRANSFUSION OF NONAUTOLOGOUS RED BLOOD CELLS INTO PERIPHERAL VEIN, PERCUTANEOUS APPROACH: ICD-10-PCS | Performed by: INTERNAL MEDICINE

## 2018-11-01 PROCEDURE — 86850 RBC ANTIBODY SCREEN: CPT | Performed by: FAMILY MEDICINE

## 2018-11-01 PROCEDURE — G8996 SWALLOW CURRENT STATUS: HCPCS

## 2018-11-01 PROCEDURE — 99233 SBSQ HOSP IP/OBS HIGH 50: CPT | Performed by: FAMILY MEDICINE

## 2018-11-01 PROCEDURE — G8997 SWALLOW GOAL STATUS: HCPCS

## 2018-11-01 PROCEDURE — 99024 POSTOP FOLLOW-UP VISIT: CPT | Performed by: ORTHOPAEDIC SURGERY

## 2018-11-01 PROCEDURE — 86901 BLOOD TYPING SEROLOGIC RH(D): CPT | Performed by: FAMILY MEDICINE

## 2018-11-01 PROCEDURE — 86900 BLOOD TYPING SEROLOGIC ABO: CPT | Performed by: FAMILY MEDICINE

## 2018-11-01 PROCEDURE — 92610 EVALUATE SWALLOWING FUNCTION: CPT

## 2018-11-01 RX ORDER — FUROSEMIDE 10 MG/ML
20 INJECTION INTRAMUSCULAR; INTRAVENOUS ONCE
Status: COMPLETED | OUTPATIENT
Start: 2018-11-01 | End: 2018-11-01

## 2018-11-01 RX ADMIN — ACETAMINOPHEN 650 MG: 325 TABLET, FILM COATED ORAL at 08:05

## 2018-11-01 RX ADMIN — FUROSEMIDE 20 MG: 10 INJECTION, SOLUTION INTRAMUSCULAR; INTRAVENOUS at 16:38

## 2018-11-01 RX ADMIN — PANTOPRAZOLE SODIUM 40 MG: 40 TABLET, DELAYED RELEASE ORAL at 06:02

## 2018-11-01 RX ADMIN — LEVOTHYROXINE SODIUM 75 MCG: 75 TABLET ORAL at 06:02

## 2018-11-01 RX ADMIN — SODIUM CHLORIDE, SODIUM LACTATE, POTASSIUM CHLORIDE, AND CALCIUM CHLORIDE 1000 ML: .6; .31; .03; .02 INJECTION, SOLUTION INTRAVENOUS at 03:36

## 2018-11-01 RX ADMIN — ALPRAZOLAM 0.25 MG: 0.25 TABLET ORAL at 22:17

## 2018-11-01 RX ADMIN — CEFAZOLIN SODIUM 2000 MG: 2 SOLUTION INTRAVENOUS at 02:19

## 2018-11-01 RX ADMIN — SODIUM CHLORIDE, SODIUM LACTATE, POTASSIUM CHLORIDE, AND CALCIUM CHLORIDE 1000 ML: .6; .31; .03; .02 INJECTION, SOLUTION INTRAVENOUS at 10:46

## 2018-11-01 RX ADMIN — ESCITALOPRAM OXALATE 10 MG: 10 TABLET ORAL at 10:14

## 2018-11-01 RX ADMIN — CEFAZOLIN SODIUM 2000 MG: 2 SOLUTION INTRAVENOUS at 13:16

## 2018-11-01 RX ADMIN — ACETAMINOPHEN 650 MG: 325 TABLET, FILM COATED ORAL at 18:20

## 2018-11-01 NOTE — ASSESSMENT & PLAN NOTE
Normal ECG, no chest pain  Continue cardiac medications  Patient tolerated procedure without cardiac complications

## 2018-11-01 NOTE — ASSESSMENT & PLAN NOTE
Will provide IV NS bolus  Monitor BP closely, obtain manual reading  Patient to be transfused 2 u PRBC

## 2018-11-01 NOTE — PLAN OF CARE
Problem: Nutrition/Hydration-ADULT  Goal: Nutrient/Hydration intake appropriate for improving, restoring or maintaining nutritional needs  Monitor and assess patient's nutrition/hydration status for malnutrition (ex- brittle hair, bruises, dry skin, pale skin and conjunctiva, muscle wasting, smooth red tongue, and disorientation)  Collaborate with interdisciplinary team and initiate plan and interventions as ordered  Monitor patient's weight and dietary intake as ordered or per policy  Utilize nutrition screening tool and intervene per policy  Determine patient's food preferences and provide high-protein, high-caloric foods as appropriate  INTERVENTIONS:  - Monitor oral intake, urinary output, labs, and treatment plans  - Assess nutrition and hydration status and recommend course of action  - Evaluate amount of meals eaten  - Assist patient with eating if necessary   - Allow adequate time for meals  - Recommend/ encourage appropriate diets, oral nutritional supplements, and vitamin/mineral supplements  - Order, calculate, and assess calorie counts as needed  - Recommend, monitor, and adjust tube feedings and TPN/PPN based on assessed needs  - Assess need for intravenous fluids  - Provide specific nutrition/hydration education as appropriate  - Include patient/family/caregiver in decisions related to nutrition   Outcome: Not Progressing  Pt was made NPO d/t RN and pt's daughter reported choking episodes on water and medications  Pending ST evaluation

## 2018-11-01 NOTE — PHYSICAL THERAPY NOTE
PHYSICAL THERAPY NOTE          Patient Name: Monty Nguyen  WVAYC'I Date: 11/1/2018     PT consult received post operatively  Pt is s/p ORIF R hip on 10/31  Currently hypotensive and awaiting blood transfusion given hemoglobin 6 9  PT will follow and attempt eval when more medically appropriate    Angel Lazo, PT

## 2018-11-01 NOTE — ANESTHESIA POSTPROCEDURE EVALUATION
Post-Op Assessment Note      CV Status:  Stable    Mental Status:  Alert and awake    Hydration Status:  Euvolemic    PONV Controlled:  Controlled    Airway Patency:  Patent  Airway: intubated    Post Op Vitals Reviewed: Yes          Staff: Anesthesiologist           /51 (10/31/18 1951)    Temp 98 1 °F (36 7 °C) (10/31/18 1951)    Pulse 78 (10/31/18 1951)   Resp 21 (10/31/18 1951)    SpO2 96 % (10/31/18 1951)

## 2018-11-01 NOTE — ASSESSMENT & PLAN NOTE
S/p closed reduction internal fixation for right hip fracture 10/31 POD 1  Consent obtained from daughter Vincent Sequeira due to patient's baseline dementia  Will transfuse 2 units PRBC, will give Lasix 20 mg IV between the units  Monitor CBC

## 2018-11-01 NOTE — PROGRESS NOTES
Progress Note - Gely Carbajal 9/26/1923, 80 y o  female MRN: [id number]    Unit/Bed#: E2 -01 Encounter: [id number]    Primary Care Provider: Monica Martinez DO   Date and time admitted to hospital: 10/30/2018  5:11 PM        * Closed 2-part intertrochanteric fracture of proximal end of right femur [facility])   Assessment & Plan    · S/p closed reduction internal fixation for right hip fracture 10/31 POD  1  · Appears to be due to mechanical fall   · Pain controlled  · DVT prophylaxis per orthopedic surgical team  · Will prepare and transfuse 2 units PRBC  · Resume diet  · Patient's ambulatory status is with a walker although she will frequently ambulate without a walker  · Was living at assisted living facility due to need for close monitoring/administration of medication regimens and will need rehab following hip fracture treatment     Lytic bone lesion of right femur   Assessment & Plan    · Per H&P, lateral femur film on hip series shows large lytic area of bone and subsequent AP views of the femur Show cortical thinning at the distal aspect within the available imaging  · I did not find evidence of a lytic lesion on current imaging  · Discussed with orthopedic surgery and it is felt current fracture is related to mechanical fall and not necessarily to the described lesion     Ambulatory dysfunction   Assessment & Plan    · Patient ambulates with a walker at baseline but is independent with all other ADLs  · Patient will occasionally forget to take her walker with her at times creating a fall risk     Benign essential hypertension   Assessment & Plan    · Patient hypotensive due to anemia  · Will give 1L bolus  · Hold BP meds  · Will transfuse 2 u PRBC      Coronary artery disease   Assessment & Plan    Normal ECG, no chest pain  Continue cardiac medications  Patient tolerated procedure without cardiac complications     Dementia without behavioral disturbance   Assessment & Plan    Patient was able to carry out ADLs at assisted living  She is normally well oriented to situation in her home environement  Patient is oriented to self, she is reoriented will type of facility, she answers some questions appropriately - her mental exam appear similar to yesterday's prior to surgery  Continue Aricept  Obtained consent for blood transfusion from daughterFranklin  Continue serial exams     Hematoma of parietal scalp   Assessment & Plan    "Right posterior parietal/occipital scalp hematoma" per CT head wo contrast s/p fall  Previously reported subdural hematoma not identified  Serial exams     Hx of non-ST elevation myocardial infarction (NSTEMI)   Assessment & Plan    · Continue ASA 81mg  · Echocardiogram 2017 revealed LVEF:  55%-65% with mild aortic regurgitation, mild tricuspid regurgitation, mild pulmonic valve regurgitation  · NSTEMI May 2014, recurrent chest pain episodes May 2015 with a peak troponin of 1 5 and 3 hospitalizations for chest pain in June 2015 with a peak troponin of 0 76  · Patient currently chest pain free and with a normal ECG, tolerated procedure without cardiac complications     Postprocedural hypotension   Assessment & Plan    Will provide IV NS bolus  Monitor BP closely, obtain manual reading  Patient to be transfused 2 u PRBC     Acute blood loss anemia   Assessment & Plan    S/p closed reduction internal fixation for right hip fracture 10/31 POD  1  Consent obtained from daughter Franklin Lui due to patient's baseline dementia  Will transfuse 2 units PRBC, will give Lasix 20 mg IV between the units  Monitor CBC         History of subdural hemorrhage   Assessment & Plan    · May 2014  · Managed medically  · Not demonstrated on current CT of the brain     Generalized anxiety disorder   Assessment & Plan    · Continue Lexapro, Depakote q h s , Xanax     Hypothyroidism   Assessment & Plan    · Continue levothyroxine       VTE Pharmacologic Prophylaxis:   Pharmacologic: Enoxaparin (Lovenox)  Mechanical VTE Prophylaxis in Place: Yes    Patient Centered Rounds: I have performed bedside rounds with nursing staff today  Discussions with Specialists or Other Care Team Provider: Orthopedic surgery    Education and Discussions with Family / Patient: Patient's daughter Whitley    Time Spent for Care: 45 minutes  More than 50% of total time spent on counseling and coordination of care as described above  Current Length of Stay: 2 day(s)    Current Patient Status: Inpatient   Certification Statement: The patient will continue to require additional inpatient hospital stay due to need for blood transfusion, close monitoring    Discharge Plan: TBD    Code Status: Level 3 - DNAR and DNI      Subjective:   Patient seen and examined  She does not appear in distress  She is status post right hip repair surgery postop day 1  She denies any pain  Her only complaint is oral dryness  She denies chest pain or breathing difficulty  No overnight events  Objective:     Vitals:   Temp (24hrs), Av °F (36 7 °C), Min:97 1 °F (36 2 °C), Max:99 1 °F (37 3 °C)    Temp:  [97 1 °F (36 2 °C)-99 1 °F (37 3 °C)] 97 4 °F (36 3 °C)  HR:  [] 86  Resp:  [16-22] 16  BP: ()/(40-62) 90/40  SpO2:  [92 %-100 %] 100 %  Body mass index is 19 6 kg/m²  Input and Output Summary (last 24 hours): Intake/Output Summary (Last 24 hours) at 18 1108  Last data filed at 18 0601   Gross per 24 hour   Intake           3632 5 ml   Output              900 ml   Net           2732 5 ml       Physical Exam:     Physical Exam   Constitutional: No distress  HENT:   Head: Normocephalic and atraumatic  Eyes: Conjunctivae are normal    Neck: No JVD present  Cardiovascular: Normal rate and regular rhythm  No murmur heard  Pulmonary/Chest: Effort normal  No respiratory distress  She has no wheezes  She has no rales  Abdominal: Soft  She exhibits no distension  There is no tenderness  There is no guarding     Musculoskeletal: She exhibits no edema  Neurological: She is alert  Skin: Skin is warm and dry  Bandage R hip at surgical site   Psychiatric: Her speech is tangential  Cognition and memory are impaired  Additional Data:     Labs:      Results from last 7 days  Lab Units 11/01/18  0547   WBC Thousand/uL 13 27*   HEMOGLOBIN g/dL 6 9*   HEMATOCRIT % 21 9*   PLATELETS Thousands/uL 114*   NEUTROS PCT % 82*   LYMPHS PCT % 8*   MONOS PCT % 9   EOS PCT % 0       Results from last 7 days  Lab Units 11/01/18  0547  10/30/18  1855   SODIUM mmol/L 144  < > 143   POTASSIUM mmol/L 4 5  < > 3 9   CHLORIDE mmol/L 108  < > 104   CO2 mmol/L 30  < > 30   BUN mg/dL 24  < > 22   CREATININE mg/dL 1 16  < > 0 74   ANION GAP mmol/L 6  < > 9   CALCIUM mg/dL 8 0*  < > 9 3   ALBUMIN g/dL  --   --  3 5   TOTAL BILIRUBIN mg/dL  --   --  0 40   ALK PHOS U/L  --   --  57   ALT U/L  --   --  18   AST U/L  --   --  19   < > = values in this interval not displayed  * I Have Reviewed All Lab Data Listed Above  * Additional Pertinent Lab Tests Reviewed:  Sj 66 Admission Reviewed    Imaging:    Imaging Reports Reviewed Today Include: XR hip/pelv 11/1      Recent Cultures (last 7 days):           Last 24 Hours Medication List:     Current Facility-Administered Medications:  acetaminophen 650 mg Oral Q6H PRN Belinda Dalton PA-C    ALPRAZolam 0 25 mg Oral BID PRN Tabitha Hoffman PA-C    aluminum-magnesium hydroxide-simethicone 30 mL Oral Q6H PRN Belinda Dalton PA-C    aspirin 81 mg Oral Daily Braxton Arriaga PA-C    calcium carbonate 1,000 mg Oral Daily PRN Belinda Dalton PA-C    cefazolin 2,000 mg Intravenous Q8H Belinda Dalton PA-C Last Rate: 2,000 mg (11/01/18 0219)   divalproex sodium 250 mg Oral HS Tabitha Hoffman PA-C    docusate sodium 100 mg Oral BID Belinda Dalton PA-C    donepezil 10 mg Oral HS Tabitha Hoffman PA-C    enoxaparin 40 mg Subcutaneous Daily Belinda Dalton PA-C    escitalopram 10 mg Oral Daily Naomie Delgado PA-C    lactated ringers 1,000 mL Intravenous Once Dav Marroquin MD Last Rate: 1,000 mL (11/01/18 1046)   levothyroxine 75 mcg Oral Early Morning Naomie Delgado PA-C    loperamide 2 mg Oral 4x Daily PRN Naomie Delgado PA-C    metoprolol tartrate 25 mg Oral Q12H [facility] Naomie Delgado PA-C    morphine injection 1 mg Intravenous Q4H PRN Dav Marroquin MD    morphine injection 2 mg Intravenous Q4H PRN Dav Marroquin MD    pantoprazole 40 mg Oral Early Morning Autumn S HEATHER Dalton    senna 1 tablet Oral Daily Autumn S HEATHER Dalton    simethicone 80 mg Oral 4x Daily PRN Autumn S HEATHER Dalton    sodium chloride 75 mL/hr Intravenous Continuous Autumn S HEATHER Dalton Last Rate: 75 mL/hr (11/01/18 0705)        Today, Patient Was Seen By: Dino Lorenzo MD    ** Please Note: Dictation voice to text software may have been used in the creation of this document   **

## 2018-11-01 NOTE — MEDICAL STUDENT
Progress Note - Adam Parikh 80 y o  female MRN: [id number]    Unit/Bed#: E2 -01 Encounter: [id number]      Assessment / Plan:      Anemia  Her hemoglobin is 6 9 this morning, with associated mild hypotension  This is likely related to surgical losses  There is no evidence of overt bleeding from surgical site, hematoma, ecchymosis  Patient's daughter is at bedside and Dr Pablo Puentes is obtaining consent for transfusion    -Will give 500mL normal saline solution for hypotension   -Continue IVF hydration   -Transfuse 1 unit of PRBC, check CBC post transfusion  -CBC in am   -BMP in am      Closed 2-part intertrochanteric fracture of proximal end of right femur ([address] )  This was a result of a fall, which the patient admits that she forgot to use her walker  She is now status post ORIF of right hip POD 1  Post-op management of surgical site per orthopedics     -Continue PT and OT consult  -Fall precautions  Benign essential hypertension  Her blood pressure is well controlled  -Continue metoprolol  -Continue as needed hydralazine      Hypothyroidism  -Continue levothyroxine      Generalized anxiety disorder  -Continue escitalopram  -Continue alprazolam  -Continue depakote      Dementia without behavioral disturbance  She is oriented to self only  One of patient's daughter's sitting at bedside, patient is familiar with her daughter but unable to identify her    -Continue donepezil  Ambulatory dysfunction  She uses a walker at baseline  She admits that she forgets her walker at times  -PT and OT have been consulted  Coronary artery disease    Hx of non-ST elevation myocardial infarction (NSTEMI)  No current s/s or evidence of ischemia   -Continue aspirin  -Continue metoprolol      Mild cognitive impairment  This is related to her dementia  History of subdural hemorrhage  This is historical from 2014, as a result of a backwards fall in 05/2014    Serial head CTs were completed and resolution noted by radiologist read as of 08/2014  Hematoma of parietal scalp  This is likely a result of her recent fall  There is no intracranial hemorrhage or hematoma noted per radiologist read of CT of head  No surgical intervention required  Dry mouth  Patient's daughter states that the patient has been having this for some time  This is likely worsened due to recent anesthesia exposure     -Oral care  -Nursing bedside swallow screening   -Encourage PO intake if patient passes nursing bedside swallow evaluation  Subjective:   She states that she is not having pain at this time  She denies shortness of breath, chest pain, lightheadedness, fever, chills, nausea and vomiting  She is complaining of dry mouth  Objective:     Vitals: Blood pressure 109/55, pulse 86, temperature (!) 97 4 °F (36 3 °C), temperature source Tympanic, resp  rate 16, height 5' 3" (1 6 m), weight 50 2 kg (110 lb 10 7 oz), SpO2 100 %  ,Body mass index is 19 6 kg/m²  Vitals:    11/01/18 0300 11/01/18 0400 11/01/18 0702 11/01/18 1027   BP: 90/60 132/58 109/55 (!) 90/40   BP Location: Right arm Left arm Left arm Right arm   Pulse: 85  86    Resp: 16  16    Temp: 98 6 °F (37 °C)  (!) 97 4 °F (36 3 °C)    TempSrc: Temporal  Tympanic    SpO2: 100%  100%    Weight:       Height:             Lab Results   Component Value Date    WBC 13 27 (H) 11/01/2018    HGB 6 9 (LL) 11/01/2018    HCT 21 9 (L) 11/01/2018     (H) 11/01/2018     (L) 11/01/2018     Lab Results   Component Value Date     11/01/2018    K 4 5 11/01/2018     11/01/2018    CO2 30 11/01/2018    ANIONGAP 5 09/09/2015    BUN 24 11/01/2018    CREATININE 1 16 11/01/2018    GLUCOSE 103 09/09/2015    GLUF 118 (H) 09/27/2017    CALCIUM 8 0 (L) 11/01/2018    AST 19 10/30/2018    ALT 18 10/30/2018    ALKPHOS 57 10/30/2018    PROT 7 7 09/09/2015    BILITOT 0 76 09/09/2015    EGFR 40 11/01/2018       I/O last 3 completed shifts:   In: 3632 5 [I V :3632 5]  Out: 2740 [AXIXE:780; Blood:300]    Physical Exam   Constitutional: She appears well-developed and well-nourished  No distress  HENT:   Head: Normocephalic and atraumatic  Eyes: Pupils are equal, round, and reactive to light  EOM are normal    Neck: Normal range of motion  Neck supple  Cardiovascular: Normal rate and regular rhythm  Murmur heard  Pulses:       Radial pulses are 2+ on the right side, and 2+ on the left side  Dorsalis pedis pulses are 1+ on the right side, and 1+ on the left side  Pulmonary/Chest: Effort normal and breath sounds normal  No respiratory distress  Abdominal: Soft  Bowel sounds are normal  She exhibits no distension  There is no guarding  Musculoskeletal:        Right hip: She exhibits bony tenderness  Tender during repositioning per nursing staff  No tenderness noted to palpation  Neurological: She is alert  GCS eye subscore is 4  GCS verbal subscore is 4  GCS motor subscore is 6  Disoriented to time and place  Re-orientable to time only  Skin: Skin is warm and dry  Capillary refill takes 2 to 3 seconds  No ecchymosis and no rash noted  No cyanosis or erythema  There is pallor  Psychiatric: She has a normal mood and affect  Her speech is normal and behavior is normal  Judgment and thought content normal  Cognition and memory are impaired  Invasive Devices     Peripheral Intravenous Line            Peripheral IV 10/31/18 Right Forearm 1 day    Peripheral IV 10/31/18 Left Wrist less than 1 day                Lab, Imaging and other studies: I have personally reviewed pertinent reports      VTE Pharmacologic Prophylaxis: Enoxaparin (Lovenox)  VTE Mechanical Prophylaxis: sequential compression device    Saint Prophet, RN  Student Nurse Practitioner

## 2018-11-01 NOTE — PROGRESS NOTES
Progress Note - Orthopedics   Eliceo Begun 80 y o  female MRN: [id number]  Unit/Bed#: E2 -01 Encounter: [id number]    Assessment:  80 y o  female s/p closed reduction internal fixation for right hip fracture  POD 1, ABLA    Plan:  Pain control prn  PT/OT-WBAT right LE   Lovenox/TEDs/SCDs for DVT prophylaxis  Abx x 24h  Med mgt per SLIM  Patient to receive 2 U PRBCs  Monitor hgb    Subjective: Pt S&E  Admits intermittent pain right hip  Patient is somewhat confused this morning  Awake and alert  Vitals: Blood pressure (!) 106/49, pulse 97, temperature 98 9 °F (37 2 °C), temperature source Temporal, resp  rate 18, height 5' 3" (1 6 m), weight 50 2 kg (110 lb 10 7 oz), SpO2 100 %  ,Body mass index is 19 6 kg/m²        Intake/Output Summary (Last 24 hours) at 11/01/18 1340  Last data filed at 11/01/18 0601   Gross per 24 hour   Intake           3632 5 ml   Output              900 ml   Net           2732 5 ml       Invasive Devices     Peripheral Intravenous Line            Peripheral IV 10/31/18 Right Forearm 1 day    Peripheral IV 10/31/18 Left Wrist less than 1 day                Ortho Exam: rightLE:  Drsgs:  C/D/I, sensation grossly intact L4, L5, S1, palpable pedal pulse, EHL/AT/GS intact    Lab, Imaging and other studies:   CBC:   Lab Results   Component Value Date    WBC 13 27 (H) 11/01/2018    HGB 6 9 (LL) 11/01/2018    HCT 21 9 (L) 11/01/2018     (H) 11/01/2018     (L) 11/01/2018    MCH 33 0 11/01/2018    MCHC 31 5 11/01/2018    RDW 13 8 11/01/2018    MPV 10 3 11/01/2018    NRBC 0 11/01/2018     CMP:   Lab Results   Component Value Date     11/01/2018    CO2 30 11/01/2018    BUN 24 11/01/2018    CREATININE 1 16 11/01/2018    CALCIUM 8 0 (L) 11/01/2018    EGFR 40 11/01/2018

## 2018-11-01 NOTE — ASSESSMENT & PLAN NOTE
· S/p closed reduction internal fixation for right hip fracture 10/31 POD 1  · Appears to be due to mechanical fall   · Pain controlled  · DVT prophylaxis per orthopedic surgical team  · Will prepare and transfuse 2 units PRBC  · Resume diet  · Patient's ambulatory status is with a walker although she will frequently ambulate without a walker  · Was living at assisted living facility due to need for close monitoring/administration of medication regimens and will need rehab following hip fracture treatment

## 2018-11-01 NOTE — PLAN OF CARE
Problem: SLP ADULT - SWALLOWING, IMPAIRED  Goal: Initial SLP swallow eval performed  Outcome: Completed Date Met: 11/01/18

## 2018-11-01 NOTE — PROGRESS NOTES
Bladder scanned pt at 1330, pt had 7ml in the bladder  Contacted MD  No further orders at this time  Will continue to monitor

## 2018-11-01 NOTE — ASSESSMENT & PLAN NOTE
· Patient hypotensive due to anemia  · Will give 1L bolus  · Hold BP meds  · Will transfuse 2 u PRBC

## 2018-11-01 NOTE — ASSESSMENT & PLAN NOTE
Patient was able to carry out ADLs at assisted living  She is normally well oriented to situation in her home environement  Patient is oriented to self, she is reoriented will type of facility, she answers some questions appropriately - her mental exam appear similar to yesterday's prior to surgery  Continue Aricept  Obtained consent for blood transfusion from daughter, Jesusita Prasad  Continue serial exams

## 2018-11-01 NOTE — SPEECH THERAPY NOTE
Speech Language/Pathology  Speech/Language Pathology  Assessment    Patient Name: Ayo Barker  TYDFY'Y Date: 11/1/2018     Problem List  Patient Active Problem List   Diagnosis    Word finding difficulty    Confusion    Benign essential hypertension    Memory loss    Hyperlipidemia    Hypothyroidism    Generalized anxiety disorder    Dementia without behavioral disturbance    Anxiety    Ambulatory dysfunction    Coronary artery disease    Vitamin D deficiency    Xerostomia    Closed 2-part intertrochanteric fracture of proximal end of right femur (HCC)    Lytic bone lesion of right femur    Hx of non-ST elevation myocardial infarction (NSTEMI)    History of subdural hemorrhage    Hematoma of parietal scalp    Acute blood loss anemia    Postprocedural hypotension     Past Medical History  Past Medical History:   Diagnosis Date    Abnormal CT scan, neck     Last Assessed:  5/28/14    Altered mental status 11/22/2017    Anxiety     Basal cell adenocarcinoma     Benign essential hypertension     Last Assessed:  1/14/13    Closed 2-part intertrochanteric fracture of proximal end of right femur ([address] ) 10/30/2018    Coronary artery disease     Dementia 2006    Depression     Diverticulosis     Essential hypertension 11/22/2017    Hyperlipidemia     Hypertension     Hypothyroid 11/22/2017    Hypothyroid     Insomnia     Long term (current) use of antithrombotics/antiplatelets     Last Assessed:  9/23/13    Lytic bone lesion of right femur 10/30/2018    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 ([address] )     MI (myocardial infarction) ([address] )     Last Assessed:  6/11/15    Subdural hematoma, post-traumatic ([address] ) 2014    left sided, treated nonoperatively    Subdural hygroma 2014    s/p subdural hematoma    TIA (transient ischemic attack)     Unstable angina ([address] ) 2015    Vertigo      Past Surgical History  Past Surgical History:   Procedure Laterality Date    BREAST LUMPECTOMY Right     CATARACT EXTRACTION      CHOLECYSTECTOMY      COLONOSCOPY  2003    HYSTERECTOMY      Total abdominal with removal of both ovaries    IL OPEN RX FEMUR FX+INTRAMED ANTOINETTE Right 10/31/2018    Procedure: INSERTION NAIL IM FEMUR ANTEGRADE (TROCHANTERIC); Surgeon: Marquise Storm DO;  Location: [facility];  Service: Orthopedics     HPI:   Lacy Neal is a 80 y o  female who resides at [facility]  She was ambulating behind the residence building today when she was utilizing her walker when she went onto grass and fell from a standing position  She was unable to get up  The fall was unwitnessed  Staff who did find her feels she may have been on the ground for about 25 minutes  Upon coming to the emergency department, workup revealed a right intertrochanteric hip fracture  Incidentally on the lateral x-rays more so than on the AP, a lytic lesion was identified in the mid shaft  Her daughters indicate that she has lost about 30 lb to 40 lb over the last year  She has had a generalized decreased appetite  The patient reports the usual constipation with no melena and no hematochezia when she does have a bowel movement  She denies abdominal pain  She denies midshaft femur pain prior to the fall today and localizes pain to the right hip  Bedside Swallow Evaluation:    Summary:  Pt presents w/ confusion, able to tolerate jello and applesauce, sips of thin liquids "oooh that's enough  I'm filled up to here"  Daughter and son at bedside, state she is confused but seems to be getting better  Transfusion running  Recommendations:  Diet: clears for now  Will reassess tomorrow  Crush meds  Supervision: full  Positioning:Upright  Strategies: Pt to take PO/Meds only when fully alert and upright     Oral care  Aspiration precautions  Reflux precautions    Therapy Prognosis: favorable  Prognosis considerations:premorbid status  Frequency:3-5x/week    Patient's goal:none stated    Consider consult w/:  Nutrition    Reason for consult:  R/o aspiration  Determine safest and least restrictive diet  Change in mental status  Current diet:  npo  Premorbid diet[de-identified]  regular  Previous VBS:  None known  O2 requirement:  nc  Voice/Speech:  mild dysphonia  Social:  [place]  Follows commands:  inconsistent                      Cognitive Status:  Pleasantly confused  Tangential    Oral mech exam:  Partial dentition  Partial plate  Full symmetry  Items administered:  Puree, jello, thin liquids  Liquids were taken by straw  Oral stage:  Swished liquids around in mouth to begin, needed cue to swallow  Lip closure:wfl  Mastication:na  Bolus formation:wfl  Bolus control:wfl  Transfer:wfl, delayed at times  Oral residue:none  Pocketing:none    Pharyngeal stage:  WFL, cued to focus (gets easily distracted) and swallow  Swallow promptness: prompt  Laryngeal rise: adequate  Wet voice:no  Throat clear:no  Cough:no  Secondary swallows:none  Audible swallows:none  No s/s aspiration    Esophageal stage:  No s/s reported  "that's it, i'm filled to here" pointing to neck  Results d/w:  Pt, nursing, family    Goal(s):  Pt will tolerate least restrictive diet w/out s/s aspiration or oral/pharyngeal difficulties

## 2018-11-01 NOTE — OCCUPATIONAL THERAPY NOTE
Occupational Therapy Cancellation Note        Patient Name: Gely Carbajal  DKBLL'S Date: 11/1/2018    OT consult received  Pt w/ Hbg at 6 9 and is hypotensive and is going to be receiving a transfusion and not appropriate at this time  Will continue to follow to complete OT evaluation       Mark Lowery MS, OTR/L

## 2018-11-01 NOTE — ASSESSMENT & PLAN NOTE
· Continue ASA 81mg  · Echocardiogram 2017 revealed LVEF:  55%-65% with mild aortic regurgitation, mild tricuspid regurgitation, mild pulmonic valve regurgitation  · NSTEMI May 2014, recurrent chest pain episodes May 2015 with a peak troponin of 1 5 and 3 hospitalizations for chest pain in June 2015 with a peak troponin of 0 76  · Patient currently chest pain free and with a normal ECG, tolerated procedure without cardiac complications

## 2018-11-01 NOTE — ASSESSMENT & PLAN NOTE
· May 2014  · Managed medically  · Not demonstrated on current CT of the brain The patient is a 5y8m Male complaining of

## 2018-11-02 PROBLEM — R13.19 OTHER DYSPHAGIA: Status: ACTIVE | Noted: 2018-11-02

## 2018-11-02 PROBLEM — E87.6 HYPOKALEMIA: Status: ACTIVE | Noted: 2018-11-02

## 2018-11-02 PROBLEM — D69.6 THROMBOCYTOPENIA (HCC): Status: ACTIVE | Noted: 2018-11-02

## 2018-11-02 LAB
ABO GROUP BLD BPU: NORMAL
ABO GROUP BLD BPU: NORMAL
ANION GAP SERPL CALCULATED.3IONS-SCNC: 4 MMOL/L (ref 4–13)
BASOPHILS # BLD AUTO: 0.03 THOUSANDS/ΜL (ref 0–0.1)
BASOPHILS NFR BLD AUTO: 0 % (ref 0–1)
BPU ID: NORMAL
BPU ID: NORMAL
BUN SERPL-MCNC: 28 MG/DL (ref 5–25)
CALCIUM SERPL-MCNC: 7.8 MG/DL (ref 8.3–10.1)
CHLORIDE SERPL-SCNC: 107 MMOL/L (ref 100–108)
CO2 SERPL-SCNC: 31 MMOL/L (ref 21–32)
CREAT SERPL-MCNC: 0.87 MG/DL (ref 0.6–1.3)
CROSSMATCH: NORMAL
CROSSMATCH: NORMAL
EOSINOPHIL # BLD AUTO: 0.03 THOUSAND/ΜL (ref 0–0.61)
EOSINOPHIL NFR BLD AUTO: 0 % (ref 0–6)
ERYTHROCYTE [DISTWIDTH] IN BLOOD BY AUTOMATED COUNT: 16.5 % (ref 11.6–15.1)
GFR SERPL CREATININE-BSD FRML MDRD: 57 ML/MIN/1.73SQ M
GLUCOSE SERPL-MCNC: 97 MG/DL (ref 65–140)
HCT VFR BLD AUTO: 25.3 % (ref 34.8–46.1)
HGB BLD-MCNC: 8.2 G/DL (ref 11.5–15.4)
IMM GRANULOCYTES # BLD AUTO: 0.03 THOUSAND/UL (ref 0–0.2)
IMM GRANULOCYTES NFR BLD AUTO: 0 % (ref 0–2)
LYMPHOCYTES # BLD AUTO: 1.43 THOUSANDS/ΜL (ref 0.6–4.47)
LYMPHOCYTES NFR BLD AUTO: 16 % (ref 14–44)
MCH RBC QN AUTO: 31.3 PG (ref 26.8–34.3)
MCHC RBC AUTO-ENTMCNC: 32.4 G/DL (ref 31.4–37.4)
MCV RBC AUTO: 97 FL (ref 82–98)
MONOCYTES # BLD AUTO: 0.98 THOUSAND/ΜL (ref 0.17–1.22)
MONOCYTES NFR BLD AUTO: 11 % (ref 4–12)
NEUTROPHILS # BLD AUTO: 6.26 THOUSANDS/ΜL (ref 1.85–7.62)
NEUTS SEG NFR BLD AUTO: 73 % (ref 43–75)
NRBC BLD AUTO-RTO: 0 /100 WBCS
PLATELET # BLD AUTO: 94 THOUSANDS/UL (ref 149–390)
PMV BLD AUTO: 10.2 FL (ref 8.9–12.7)
POTASSIUM SERPL-SCNC: 3.2 MMOL/L (ref 3.5–5.3)
RBC # BLD AUTO: 2.62 MILLION/UL (ref 3.81–5.12)
SODIUM SERPL-SCNC: 142 MMOL/L (ref 136–145)
TSH SERPL DL<=0.05 MIU/L-ACNC: 2 UIU/ML (ref 0.36–3.74)
UNIT DISPENSE STATUS: NORMAL
UNIT DISPENSE STATUS: NORMAL
UNIT PRODUCT CODE: NORMAL
UNIT PRODUCT CODE: NORMAL
UNIT RH: NORMAL
UNIT RH: NORMAL
WBC # BLD AUTO: 8.76 THOUSAND/UL (ref 4.31–10.16)

## 2018-11-02 PROCEDURE — 85025 COMPLETE CBC W/AUTO DIFF WBC: CPT | Performed by: PHYSICIAN ASSISTANT

## 2018-11-02 PROCEDURE — 99024 POSTOP FOLLOW-UP VISIT: CPT | Performed by: ORTHOPAEDIC SURGERY

## 2018-11-02 PROCEDURE — 97163 PT EVAL HIGH COMPLEX 45 MIN: CPT

## 2018-11-02 PROCEDURE — G8979 MOBILITY GOAL STATUS: HCPCS

## 2018-11-02 PROCEDURE — 92526 ORAL FUNCTION THERAPY: CPT

## 2018-11-02 PROCEDURE — G8978 MOBILITY CURRENT STATUS: HCPCS

## 2018-11-02 PROCEDURE — 97167 OT EVAL HIGH COMPLEX 60 MIN: CPT

## 2018-11-02 PROCEDURE — 99232 SBSQ HOSP IP/OBS MODERATE 35: CPT | Performed by: FAMILY MEDICINE

## 2018-11-02 PROCEDURE — G8988 SELF CARE GOAL STATUS: HCPCS

## 2018-11-02 PROCEDURE — G8987 SELF CARE CURRENT STATUS: HCPCS

## 2018-11-02 PROCEDURE — 84443 ASSAY THYROID STIM HORMONE: CPT | Performed by: FAMILY MEDICINE

## 2018-11-02 PROCEDURE — 97535 SELF CARE MNGMENT TRAINING: CPT

## 2018-11-02 PROCEDURE — 80048 BASIC METABOLIC PNL TOTAL CA: CPT | Performed by: PHYSICIAN ASSISTANT

## 2018-11-02 RX ORDER — POTASSIUM CHLORIDE 20MEQ/15ML
20 LIQUID (ML) ORAL 2 TIMES DAILY
Status: DISCONTINUED | OUTPATIENT
Start: 2018-11-02 | End: 2018-11-06 | Stop reason: HOSPADM

## 2018-11-02 RX ORDER — ESCITALOPRAM OXALATE 10 MG/1
10 TABLET ORAL DAILY
Status: DISCONTINUED | OUTPATIENT
Start: 2018-11-02 | End: 2018-11-06 | Stop reason: HOSPADM

## 2018-11-02 RX ORDER — DIVALPROEX SODIUM 250 MG/1
250 TABLET, DELAYED RELEASE ORAL
Status: DISCONTINUED | OUTPATIENT
Start: 2018-11-02 | End: 2018-11-06 | Stop reason: HOSPADM

## 2018-11-02 RX ORDER — ACETAMINOPHEN 325 MG/1
975 TABLET ORAL EVERY 8 HOURS SCHEDULED
Status: DISCONTINUED | OUTPATIENT
Start: 2018-11-02 | End: 2018-11-06 | Stop reason: HOSPADM

## 2018-11-02 RX ORDER — OXYCODONE HYDROCHLORIDE 5 MG/1
2.5 TABLET ORAL EVERY 4 HOURS PRN
Status: DISCONTINUED | OUTPATIENT
Start: 2018-11-02 | End: 2018-11-06 | Stop reason: HOSPADM

## 2018-11-02 RX ORDER — OXYCODONE HYDROCHLORIDE 5 MG/1
5 TABLET ORAL EVERY 4 HOURS PRN
Status: DISCONTINUED | OUTPATIENT
Start: 2018-11-02 | End: 2018-11-06 | Stop reason: HOSPADM

## 2018-11-02 RX ORDER — LEVOTHYROXINE SODIUM 0.07 MG/1
75 TABLET ORAL
Status: DISCONTINUED | OUTPATIENT
Start: 2018-11-02 | End: 2018-11-06 | Stop reason: HOSPADM

## 2018-11-02 RX ORDER — ASPIRIN 81 MG/1
81 TABLET, CHEWABLE ORAL DAILY
Status: DISCONTINUED | OUTPATIENT
Start: 2018-11-02 | End: 2018-11-06 | Stop reason: HOSPADM

## 2018-11-02 RX ORDER — DONEPEZIL HYDROCHLORIDE 10 MG/1
10 TABLET, FILM COATED ORAL
Status: DISCONTINUED | OUTPATIENT
Start: 2018-11-02 | End: 2018-11-06 | Stop reason: HOSPADM

## 2018-11-02 RX ADMIN — POTASSIUM CHLORIDE 20 MEQ: 20 SOLUTION ORAL at 09:57

## 2018-11-02 RX ADMIN — POTASSIUM CHLORIDE 20 MEQ: 20 SOLUTION ORAL at 18:09

## 2018-11-02 RX ADMIN — OXYCODONE HYDROCHLORIDE 5 MG: 5 TABLET ORAL at 10:42

## 2018-11-02 RX ADMIN — ESCITALOPRAM OXALATE 10 MG: 10 TABLET ORAL at 10:43

## 2018-11-02 RX ADMIN — ASPIRIN 81 MG: 81 TABLET, CHEWABLE ORAL at 10:43

## 2018-11-02 RX ADMIN — ACETAMINOPHEN 975 MG: 325 TABLET, FILM COATED ORAL at 16:09

## 2018-11-02 RX ADMIN — METOPROLOL TARTRATE 25 MG: 25 TABLET ORAL at 10:42

## 2018-11-02 RX ADMIN — LEVOTHYROXINE SODIUM 75 MCG: 75 TABLET ORAL at 10:51

## 2018-11-02 NOTE — ASSESSMENT & PLAN NOTE
· Patient was hypotensive postoperatively, received boluses and RBCs, currently normotensive  · Restarted metoprolol with holding parameters

## 2018-11-02 NOTE — ASSESSMENT & PLAN NOTE
Patient was able to carry out ADLs at assisted living  She is normally well oriented to situation in her home environement  Patient's mental status appears improved today, she is alert and oriented to self and place  Continue Aricept  Obtained consent for blood transfusion from daughter, Shara Garrett  Continue serial exams

## 2018-11-02 NOTE — PROGRESS NOTES
Went into patient room at 2200 for hourly rounding  Patients IV found pulled out and blood transfusion was still infusing  Patient had 40cc left  Contacted  CRNP to make them aware  Documented how much of the blood was infused  Vitals taken patient stable

## 2018-11-02 NOTE — PHYSICAL THERAPY NOTE
PT EVALUATION    89 y o     [id number]    Femur fracture (HCC) [S72 90XA]    Past Medical History:   Diagnosis Date    Abnormal CT scan, neck     Last Assessed:  5/28/14    Altered mental status 11/22/2017    Anxiety     Basal cell adenocarcinoma     Benign essential hypertension     Last Assessed:  1/14/13    Closed 2-part intertrochanteric fracture of proximal end of right femur ([facility] [address] ) 10/30/2018    Coronary artery disease     Dementia 2006    Depression     Diverticulosis     Essential hypertension 11/22/2017    Hyperlipidemia     Hypertension     Hypothyroid 11/22/2017    Hypothyroid     Insomnia     Long term (current) use of antithrombotics/antiplatelets     Last Assessed:  9/23/13    Lytic bone lesion of right femur 10/30/2018    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 ([facility] [address] )     MI (myocardial infarction) ([facility] [address] )     Last Assessed:  6/11/15    Subdural hematoma, post-traumatic ([facility] [address] ) 2014    left sided, treated nonoperatively    Subdural hygroma 2014    s/p subdural hematoma    TIA (transient ischemic attack)     Unstable angina ([facility] [address] ) 2015    Vertigo          Past Surgical History:   Procedure Laterality Date    BREAST LUMPECTOMY Right     CATARACT EXTRACTION      CHOLECYSTECTOMY      COLONOSCOPY  2003    HYSTERECTOMY      Total abdominal with removal of both ovaries    [place] OPEN RX FEMUR FX+INTRAMED ANTONIETTE Right 10/31/2018    Procedure: INSERTION NAIL IM FEMUR ANTEGRADE (TROCHANTERIC);   Surgeon: Mago Marc DO;  [facility]: [facility];  Service: Orthopedics      11/02/18 0845   Note Type   Note type Eval only   Pain Assessment   Pain Type Acute pain   Pain Location Hip   Pain Orientation Right   Pain Rating: FLACC (Rest) - Face 0   Pain Rating: FLACC (Rest) - Legs 0   Pain Rating: FLACC (Rest) - Activity 0   Pain Rating: FLACC (Rest) - Cry 0   Pain Rating: FLACC (Rest) - Consolability 0   Score: FLACC (Rest) 0   Pain Rating: FLACC (Activity) - Face 1   Pain Rating: FLACC (Activity) - Legs 1   Pain Rating: FLACC (Activity) - Activity 1   Pain Rating: FLACC (Activity) - Cry 1   Pain Rating: FLACC (Activity) - Consolability 1   Score: FLACC (Activity) 5   Home Living   Type of Home Apartment  (reports residing at [facility])   Home Layout One level   [address] [facility]  (rollator?)   Additional Comments Limited historian with hx of dementia  Pt reporting residing in [facility], ? if now in [facility]  Reports independence prior to admission  Prior Function   Level of [place] Independent with ADLs and functional mobility  (per her report )   Lives With Alone   Receives Help From Family  (dtr, [facility] staff)   ADL Assistance Independent   Falls in the last 6 months 1 to 4   Vocational Retired   [address] does not use a walker prior to admission? Restrictions/Precautions   RLE Weight Bearing Per Order WBAT   Other Precautions Cognitive; Chair Alarm; Bed Alarm;Multiple lines;Telemetry;O2;Fall Risk;Pain   General   Additional Pertinent History Pt is 79 y/o female admitted p fall at [facility]  S/P IMN R femur p R IT fx  PT consulted  WBAT RLE  Up with assist    Family/Caregiver Present No   Cognition   Overall Cognitive Status Impaired   Arousal/Participation Cooperative   Attention Attends with cues to redirect   Orientation Level Oriented to person;Oriented to place;Oriented to situation   Following Commands Follows one step commands with increased time or repetition   Comments Hx of dementia  Redirection needed     RUE Assessment   RUE Assessment WFL  (grossly at least 3+/5)   LUE Assessment   LUE Assessment WFL  (grossly at least 3+/5)   RLE Assessment   RLE Assessment X   Strength RLE   R Hip Flexion 2/5   R Knee Extension 3-/5   R Ankle Dorsiflexion 3+/5   R Ankle Plantar Flexion 3+/5   LLE Assessment   LLE Assessment WFL   Coordination   Movements are Fluid and Coordinated 0   Coordination and Movement Description impaired RLE grossly p IMN   Light Touch   RLE Light Touch Grossly intact   LLE Light Touch Grossly intact   Bed Mobility   Supine to Sit 2  Maximal assistance   Additional items Assist x 2; Increased time required;Verbal cues;LE management   Additional Comments Cues for hand placement and technique  EOB sitting x 10 minutes, working on sitting balance in mildline with WB thru R hip  Transfers   Sit to Stand 3  Moderate assistance   Additional items Assist x 2; Increased time required;Verbal cues   Stand to Sit 3  Moderate assistance   Additional items Assist x 2; Increased time required;Verbal cues;Armrests   Additional Comments cues for hand placement and upright posture   Ambulation/Elevation   Gait pattern Improper Weight shift;Decreased foot clearance; Forward Flexion; Antalgic;Decreased R stance;R Knee Ross; Inconsistent jarret; Foward flexed; Short stride; Excessively slow   Gait Assistance 3  Moderate assist   Additional items Assist x 2;Verbal cues; Tactile cues  (chair follow )   Assistive Device Rolling walker   Distance Amb with RW 5'x1  Balance   Static Sitting Fair -   Dynamic Sitting Poor +   Static Standing Poor   Dynamic Standing Poor   Ambulatory Poor   Endurance Deficit   Endurance Deficit Yes   Endurance Deficit Description pain, fatigue  /52   Activity Tolerance   Activity Tolerance Patient limited by fatigue;Patient limited by pain   Medical Staff Made Aware NurseCliff   Nurse Made Aware yes   Assessment   Prognosis Fair   Problem List Decreased strength;Decreased range of motion;Decreased endurance; Impaired balance;Decreased mobility; Decreased coordination;Decreased cognition; Impaired judgement;Decreased safety awareness;Decreased skin integrity;Orthopedic restrictions;Pain   Assessment Pt is 79 y/o female admitted p fall with R IT fx  S/P IMN on 10/31   Now referred to PT for eval with WBAT RLE and up with assist orders  Pt is limited historian but reports was able to walk without assist of RW, residing in apt at [facility] and stating independence with all mobilty and ADLS  Currently presents with impairments in strength, balance, activity tolerance, posture, RLE ROM and all mobility  Max A of 2 needed for bed mobilty  ModA of 2 for transfers and ambulation short distances with use of RW  Poor upright posture with ambulation, decreased RLE WB noted, impaired RLE advancement and buckling with WB RLE  Increased time and cues needed for all mobility and chair follow needed with ambulation  VSS with mobitly and OOB  At this time given impairments p IMN of femur, will require skilled PT in order to progress and STR prior to return to [facility]  PT willl follow and progress as able  Barriers to Discharge Other (Comment)  (cognition)   Goals   Patient Goals go home   STG Expiration Date 11/12/18   Short Term Goal #1 10 days:  1)  Pt will perform bed mobility with Mariann demonstrating appropriate technique 100% of the time in order to improve function and lessen caregiver assistance  2)  Perform all transfers with Mariann demonstrating safe and appropriate technique 100% of the time in order to improve ability to negotiate safely in home environment with less assistance  3) Amb with least restrictive AD > 100'x1 with Mariann in order to demonstrate ability to negotiate in home environment with less assistance  4)  Improve overall strength and balance 1/2 grade in order to optimize ability to perform functional tasks and reduce fall risk  5) Increase activity tolerance to 45 minutes in order to improve endurance to functional tasks  6) PT for ongoing patient and family/caregiver education, DME needs and d/c planning in order to promote highest level of function in least restrictive environment  Treatment Day 0   Plan   Treatment/Interventions Functional transfer training;LE strengthening/ROM; Elevations; Therapeutic exercise; Endurance training;Patient/family training;Equipment eval/education; Bed mobility;Gait training; Compensatory technique education;Continued evaluation;Spoke to nursing;OT;Spoke to MD   PT Frequency 5x/wk;7x/wk; Weekend  (5-7 days per week )   Recommendation   Recommendation Short-term skilled PT   Equipment Recommended Walker   PT - OK to Discharge Yes  (to rhb when medically stable )   Modified [place] Scale   Modified [place] Scale 4   Barthel Index   Feeding 5   Bathing 0   Grooming Score 0   Dressing Score 5   Bladder Score 10   Bowels Score 10   Toilet Use Score 5   Transfers (Bed/Chair) Score 5   Mobility (Level Surface) Score 0   Stairs Score 0   Barthel Index Score 40     History: co - morbidities, fall risk, use of assistive device, assist for adl's, cognition, multiple lines  Exam: impairments in locomotion, musculoskeletal, balance,posture, joint integrity, skin integrity, cognition, barthel 40   Clinical: unstable/unpredictable ( fall risk, cognition, bed/chair alarm, post op pain, more restrictive AD use)  Complexity:high  Tere Jauregui, PT

## 2018-11-02 NOTE — ASSESSMENT & PLAN NOTE
S/p closed reduction internal fixation for right hip fracture 10/31  Consent obtained from daughter Nikolay Lopes due to patient's baseline dementia  Transfuse 2 units PRBC 11/1, Lasix 20 mg IV given between the units  Hb at 8 2 today  Monitor CBC  Patient does not have IV access as continued to pull her IVs  Monitor closely without IV access for now

## 2018-11-02 NOTE — ASSESSMENT & PLAN NOTE
· Continue ASA 81mg and metoprolol  · Echocardiogram 2017 revealed LVEF:  55%-65% with mild aortic regurgitation, mild tricuspid regurgitation, mild pulmonic valve regurgitation  · NSTEMI May 2014, recurrent chest pain episodes May 2015 with a peak troponin of 1 5 and 3 hospitalizations for chest pain in June 2015 with a peak troponin of 0 76  · Patient currently chest pain free and with a normal ECG, tolerated procedure without cardiac complications

## 2018-11-02 NOTE — ASSESSMENT & PLAN NOTE
Resolved, was provided IV NS bolus  Monitor BP closely, obtain manual reading  Patient transfused 2 u PRBC

## 2018-11-02 NOTE — SOCIAL WORK
CM called patient's daughter [facility] to discuss STR recommendation for patient  CM left VM for daughter  NAUN awaiting call back

## 2018-11-02 NOTE — OCCUPATIONAL THERAPY NOTE
OccupationalTherapy Evaluation and Treatment Session     Patient Name: Lizette ELISE Date: 11/2/2018  Problem List  Patient Active Problem List   Diagnosis    Word finding difficulty    Confusion    Benign essential hypertension    Memory loss    Hyperlipidemia    Hypothyroidism    Generalized anxiety disorder    Dementia without behavioral disturbance    Anxiety    Ambulatory dysfunction    Coronary artery disease    Vitamin D deficiency    Xerostomia    Closed 2-part intertrochanteric fracture of proximal end of right femur (HCC)    Lytic bone lesion of right femur    Hx of non-ST elevation myocardial infarction (NSTEMI)    History of subdural hemorrhage    Hematoma of parietal scalp    Acute blood loss anemia    Postprocedural hypotension     Past Medical History  Past Medical History:   Diagnosis Date    Abnormal CT scan, neck     Last Assessed:  5/28/14    Altered mental status 11/22/2017    Anxiety     Basal cell adenocarcinoma     Benign essential hypertension     Last Assessed:  1/14/13    Closed 2-part intertrochanteric fracture of proximal end of right femur ([facility] [address] ) 10/30/2018    Coronary artery disease     Dementia 2006    Depression     Diverticulosis     Essential hypertension 11/22/2017    Hyperlipidemia     Hypertension     Hypothyroid 11/22/2017    Hypothyroid     Insomnia     Long term (current) use of antithrombotics/antiplatelets     Last Assessed:  9/23/13    Lytic bone lesion of right femur 10/30/2018    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 ([address] )     MI (myocardial infarction) ([address] )     Last Assessed:  6/11/15    Subdural hematoma, post-traumatic ([address] ) 2014    left sided, treated nonoperatively    Subdural hygroma 2014    s/p subdural hematoma    TIA (transient ischemic attack)     Unstable angina ([address] ) 2015    Vertigo      Past Surgical History  Past Surgical History:   Procedure Laterality Date    BREAST LUMPECTOMY Right  CATARACT EXTRACTION      CHOLECYSTECTOMY      COLONOSCOPY  2003    HYSTERECTOMY      Total abdominal with removal of both ovaries    [place] OPEN RX FEMUR FX+INTRAMED ANTOINETTE Right 10/31/2018    Procedure: INSERTION NAIL IM FEMUR ANTEGRADE (TROCHANTERIC); Surgeon: George Dempsey DO;  Location: [facility];  Service: Orthopedics           11/02/18 0853   Note Type   Note type Eval/Treat   Restrictions/Precautions   Weight Bearing Precautions Per Order Yes   RLE Weight Bearing Per Order WBAT   Other Precautions Cognitive; Chair Alarm; Bed Alarm;Multiple lines;Telemetry;O2;Fall Risk;Pain   Pain Assessment   Pain Assessment FLACC   Pain Type Acute pain   Pain Location Hip   Pain Orientation Right   Pain Rating: FLACC (Rest) - Face 0   Pain Rating: FLACC (Rest) - Legs 0   Pain Rating: FLACC (Rest) - Activity 0   Pain Rating: FLACC (Rest) - Cry 0   Pain Rating: FLACC (Rest) - Consolability 0   Score: FLACC (Rest) 0   Pain Rating: FLACC (Activity) - Face 1   Pain Rating: FLACC (Activity) - Legs 1   Pain Rating: FLACC (Activity) - Activity 1   Pain Rating: FLACC (Activity) - Cry 1   Pain Rating: FLACC (Activity) - Consolability 1   Score: FLACC (Activity) 5   Home Living   Type of Home Apartment  ([address] [place] [address])   Home Layout One level   [address]    Additional Comments Pt is a poor historian w/ dementia at baseline  Per chart review pt at [facility]   Pt reports use of RW for functional mobility   Prior Function   Level of [place] Independent with ADLs and functional mobility   Lives With Alone   Receives Help From Family  (Children; facility staff)   ADL Assistance Independent   IADLs Independent   Falls in the last 6 months 1 to 4  (Per chart 1 fall)   Vocational Retired   Comments Pt reports independent w/ ADLS and IADLS, pt is a poor historian at baseline, unsure of accuracy, per chart daughter reports pt in Assisted Living at [facility] w/ request for more frequent monitoring   Lifestyle   Autonomy Per pt independent w/ ADLS, IADLs, functional transfers and mobility w/ RW, assistance for transportation   Reciprocal Relationships children   Service to Others retired,    Intrinsic Gratification walking outside   ADL   Where [phone]; Increased time to complete   Grooming Assistance 4  Minimal Assistance   Grooming Deficit Setup; Increased time to complete   UB Bathing Assistance 4  Minimal Assistance   LB Bathing Assistance 2  Maximal Assistance   UB Dressing Assistance 4  Minimal Assistance   LB Dressing Assistance 2  Maximal [address]  2  Maximal Assistance   Functional Assistance 3  Moderate Assistance   Bed Mobility   Supine to Sit 2  Maximal assistance   Additional items Assist x 2;HOB elevated; Bedrails; Increased time required;LE management   Additional Comments VC for hand placement w/ use of bed pad to assist to EOB, cues to maintain upright posture while seated EOB  (/52)   Transfers   Sit to Stand 3  Moderate assistance   Additional items Assist x 2;Armrests; Increased time required;Verbal cues   Stand to Sit 3  Moderate assistance   Additional items Assist x 2;Armrests; Increased time required;Verbal cues   Additional Comments VC for hand placement   Functional Mobility   Functional Mobility 3  Moderate assistance   Additional Comments assist x2 w/ cues for RW management   Additional items Rolling walker   Balance   Static Sitting Fair -   Dynamic Sitting Poor +   Static Standing Poor +   Dynamic Standing Poor   Ambulatory Poor   Activity Tolerance   Activity Tolerance Patient limited by fatigue;Patient limited by pain   Nurse Made Aware appropriate to see per Susana SHARPE Assessment   RUE Assessment WFL  (3+/5 grossly, 3+/5 )   Edema   RUE Edema None   LUE Assessment   LUE Assessment WFL  (3+/5 grossly, 3+/5 )   Edema   LUE Edema None   Hand Function   Gross Motor Coordination Functional   Fine Motor Coordination Functional   Sensation   Light Touch No apparent deficits   Proprioception   Proprioception No apparent deficits   Vision-Basic Assessment   Current Vision Wears glasses all the time   Vision - Complex Assessment   Ocular Range of Motion [facility]   Perception   Inattention/Neglect Appears intact   Cognition   Overall Cognitive Status Impaired   Arousal/Participation Alert; Cooperative   Attention Attends with cues to redirect   Orientation Level Oriented to person;Oriented to place;Oriented to situation  (cues to orient to exact date and month, cues to place)   Memory Decreased short term memory;Decreased recall of recent events;Decreased recall of precautions   Following Commands Follows one step commands with increased time or repetition   Comments Pt w/ dementia at baseline and increased processing time to complete tasks w/ cues to redirect   Assessment   Limitation Decreased ADL status; Decreased UE ROM; Decreased UE strength;Decreased Safe judgement during ADL;Decreased cognition;Decreased endurance;Decreased self-care trans;Decreased high-level ADLs   Prognosis Good   Assessment Pt is 79 y/o female seen for OT evaluation admit [facility] on 10/30/18 s/p closed reduction internal fixation R hip w/ insertion nail IM femur antegrade (trochanteric) 2* s/p fall resulting in closed 2-part intertrochanteric fracture of right femur  Pt WBAT R LE  Comorbidities include: h/o myocardial infarction, anxiety, dementia, hypothyroidism, HTN, CAD, h/o TIA  Pt on 2L O2 for evaluation, no O2 at baseline  Pt is a poor historian w/ dementia at baseline  Per chart, pt was at [facility]  Per pt report, prior to arrival pt was independent w/ ADLS, IADLS, functional transfers and mobility w/ recent use of RW due to increased hip pain  Prior to hip pain pt reports functional mobility w/ no AD  Pt w/ assistance for transportation  Upon evaluation, pt w/ set up self-feeding, MIN assist grooming, MIN assist UB ADLS, MAX assist LB ADLS, MAX assist toileting, MAX assist x2 supine>sit bed mobility w/ VC for hand placement and LE management (/63 supine, /52 EOB), MOD assist x2 sit<>stand functional transfers w/ VC for hand placement, MOD assist x2 functional mobility w/ RW and cues for RW management  Pt seated bedside chair at end of session for breakfast w/ chair alarm intact, /53, SPO2 97% at 2L 02  Pt presents w/ the following deficits impacting occupational performance: impaired cognition (impaired orientation, requires redirection to tasks, impaired STM, decreased carryover of transfer safety, increased processing time), decreased strength and endurance, increased pain and fatigue, decreased balance, decreased activity tolerance, decreased functional reach, impaired trunk control  These deficits functionally impact participation w/ ADLS, IADLS, functional transfers and mobility  Occupational performance areas to address include: dressing, bathing, grooming, functional transfers and mobility w/ RW, formal cognitive assessment, pt education on energy conservation techniques and fall prevention strategies  From an OT standpoint, recommendation at time of d/c is STR  Goals   Patient Goals "to go home"   LTG Time Frame 7-10   Long Term Goal please see goals listed below   Plan   Treatment Interventions ADL retraining;Functional transfer training;UE strengthening/ROM; Cognitive reorientation; Endurance training;Patient/family training;Equipment evaluation/education; Compensatory technique education; Energy conservation; Activityengagement   Goal Expiration Date 11/12/18   Treatment Day 1   OT Frequency 3-5x/wk   Additional Treatment Session   Start Time 6406   End Time 3376   Treatment Assessment Pt seen for an additional skilled OT session focused on grooming   While seated bedside chair, pt w/ MIN assist grooming to wash/dry face  Pt w/ MIN assist to brush hair  Pt able to brush front and L side while using L UE to comb hair  Pt w/ setup self-feeding for breakfast w/ assistance to open large lids and straws  Pt seated bedside chair at end of session w/ call bell within reach and chair alarm intact  Pt continues to be limited due to cognition, decreased strength and endurance, decreased activity tolerance, increased pain R LE, decreased balance all impacting participation w/ ADLS, IADLs, functional transfers and mobility, leisure participation  Will continue to follow pt throughout stay at hospital to address OT POC  Recommendation at time of d/c is STR  Additional Treatment Day 1   Recommendation   OT Discharge Recommendation Short Term Rehab   OT - OK to Discharge (STR when medically stable)   Barthel Index   Feeding 5   Bathing 0   Grooming Score 0   Dressing Score 5   Bladder Score 10   Bowels Score 10   Toilet Use Score 5   Transfers (Bed/Chair) Score 5   Mobility (Level Surface) Score 0   Stairs Score 0   Barthel Index Score 40   Modified Sharkey Scale   Modified Sharkey Scale 4      OT goals to be completed in 7-10 days:     1  Pt will complete UB dressing with supervision to enhance independence with ADLs  2  Pt will complete LB dressing with supervision and LHAE PRN to enhance independence for safe d/c home  3  Pt will complete functional transfers and mobility with supervision and use of DME for participation in self-care  4  Pt will demonstrate improved dynamic standing tolerance to 5 minutes with FAIR balance to enhance performance in ADLS and IADLS  5  Pt will demonstrate carryover of hand placement and safety techniques for transfers and functional mobility for 3/3 trials  6  Pt will recall 3 safety precautions/fall prevention strategies to ensure safety at home  7  Pt will demonstrate bed mobility with supervision to enhance participation in ADLs and IADLs  8   Pt will demonstrate toileting with supervision and 100% safety  9  Pt will improve activity tolerance to FAIR (20 minutes) to increase independence with self-care  10  OT will complete formal cognitive assessment to assist with safety w/ functional activities  11   Pt will demonstrate 100% carryover of energy conservation techniques to enhance performance in ADL/IADL tasks    Emre Duque, RHODA

## 2018-11-02 NOTE — PLAN OF CARE
DISCHARGE PLANNING     Discharge to home or other facility with appropriate resources Progressing        DISCHARGE PLANNING - CARE MANAGEMENT     Discharge to post-acute care or home with appropriate resources Progressing        INFECTION - ADULT     Absence or prevention of progression during hospitalization Progressing     Absence of fever/infection during neutropenic period Progressing        Knowledge Deficit     Patient/family/caregiver demonstrates understanding of disease process, treatment plan, medications, and discharge instructions Progressing        MUSCULOSKELETAL - ADULT     Maintain or return mobility to safest level of function Progressing     Maintain proper alignment of affected body part Progressing        Nutrition/Hydration-ADULT     Nutrient/Hydration intake appropriate for improving, restoring or maintaining nutritional needs Progressing        PAIN - ADULT     Verbalizes/displays adequate comfort level or baseline comfort level Progressing        Potential for Falls     Patient will remain free of falls Progressing        Prexisting or High Potential for Compromised Skin Integrity     Skin integrity is maintained or improved Progressing        SAFETY ADULT     Maintain or return to baseline ADL function Progressing     Maintain or return mobility status to optimal level Progressing

## 2018-11-02 NOTE — PLAN OF CARE
Problem: PHYSICAL THERAPY ADULT  Goal: Performs mobility at highest level of function for planned discharge setting  See evaluation for individualized goals  Treatment/Interventions: Functional transfer training, LE strengthening/ROM, Elevations, Therapeutic exercise, Endurance training, Patient/family training, Equipment eval/education, Bed mobility, Gait training, Compensatory technique education, Continued evaluation, Spoke to nursing, OT, Spoke to MD  Equipment Recommended: Brody Nichols       See flowsheet documentation for full assessment, interventions and recommendations  Outcome: Progressing  Prognosis: Fair  Problem List: Decreased strength, Decreased range of motion, Decreased endurance, Impaired balance, Decreased mobility, Decreased coordination, Decreased cognition, Impaired judgement, Decreased safety awareness, Decreased skin integrity, Orthopedic restrictions, Pain  Assessment: Pt is 81 y/o female admitted p fall with R IT fx  S/P IMN on 10/31  Now referred to PT for eval with WBAT RLE and up with assist orders  Pt is limited historian but reports was able to walk without assist of RW, residing in [facility] and stating independence with all mobilty and ADLS  Currently presents with impairments in strength, balance, activity tolerance, posture, RLE ROM and all mobility  Max A of 2 needed for bed mobilty  ModA of 2 for transfers and ambulation short distances with use of RW  Poor upright posture with ambulation, decreased RLE WB noted, impaired RLE advancement and buckling with WB RLE  Increased time and cues needed for all mobility and chair follow needed with ambulation  VSS with mobitly and OOB  At this time given impairments p IMN of femur, will require skilled PT in order to progress and STR prior to return to [facility]  PT willl follow and progress as able  Barriers to Discharge:  Other (Comment) (cognition)     Recommendation: Short-term skilled PT     PT - OK to Discharge: Yes (to rhb when medically stable )    See flowsheet documentation for full assessment

## 2018-11-02 NOTE — PROGRESS NOTES
Progress Note - Orthopedics   Malka Ilana 80 y o  female MRN: [id number]  Unit/Bed#: E2 -01      Subjective:    89 y  o female pod 2 status post closed reduction internal fixation for right hip fracture  No acute events, patient is awake but very confused this morning  Pain controlled        Labs:    0  Lab Value Date/Time   HCT 21 9 (L) 11/01/2018 0547   HCT 31 2 (L) 10/31/2018 0453   HCT 35 3 10/30/2018 1855   HCT 35 8 06/02/2015 0555   HCT 36 2 06/01/2015 0455   HCT 39 6 05/31/2015 0939   HGB 6 9 (LL) 11/01/2018 0547   HGB 10 0 (L) 10/31/2018 0453   HGB 11 2 (L) 10/30/2018 1855   HGB 11 8 06/02/2015 0555   HGB 12 1 06/01/2015 0455   HGB 13 2 05/31/2015 0939   INR 1 06 11/21/2017 2355   INR 1 11 05/31/2015 0939   WBC 13 27 (H) 11/01/2018 0547   WBC 7 30 10/31/2018 0453   WBC 14 47 (H) 10/30/2018 1855   WBC 7 27 06/02/2015 0555   WBC 9 04 06/01/2015 0455   WBC 6 56 05/31/2015 0939   ESR 36 (H) 03/11/2014 1404   CRP 9 6 (H) 03/11/2014 1404       Meds:    Current Facility-Administered Medications:     acetaminophen (TYLENOL) tablet 650 mg, 650 mg, Oral, Q6H PRN, Beilnda Dalton PA-C, 650 mg at 11/01/18 1820    ALPRAZolam Rosy Starch) tablet 0 25 mg, 0 25 mg, Oral, BID PRN, Abdirizak Fatima PA-C, 0 25 mg at 11/01/18 2217    aluminum-magnesium hydroxide-simethicone (MYLANTA) 200-200-20 mg/5 mL oral suspension 30 mL, 30 mL, Oral, Q6H PRN, Belinda Dalton PA-C    calcium carbonate (TUMS) chewable tablet 1,000 mg, 1,000 mg, Oral, Daily PRN, Belinda Dalton PA-C    enoxaparin (LOVENOX) subcutaneous injection 40 mg, 40 mg, Subcutaneous, Daily, Belinda Dalton PA-C    loperamide (IMODIUM) capsule 2 mg, 2 mg, Oral, 4x Daily PRN, Abdirizak Fatima PA-C    morphine injection 1 mg, 1 mg, Intravenous, Q4H PRN, Christin Son MD    morphine injection 2 mg, 2 mg, Intravenous, Q4H PRN, Christin Son MD, 2 mg at 10/31/18 1236    simethicone (MYLICON) chewable tablet 80 mg, 80 mg, Oral, 4x Daily PRN, Belinda Dalton, HEATHER    Blood Culture:   No results found for: BLOODCX    Wound Culture:   No results found for: WOUNDCULT    Ins and Outs:  I/O last 24 hours: In: 310 [Blood:310]  Out: 727 [Urine:727]      Physical:  Vitals:    11/02/18 0704   BP: (!) 113/48   Pulse: 67   Resp: 18   Temp: (!) 96 5 °F (35 8 °C)   SpO2: 94%     Musculoskeletal: right Lower Extremity  · Skin  · Dressing: c/d/i   · SILT s/s/sp/dp/t  +fhl/ehl, +ankle dorsi/plantar flexion  +PT pulse  · Toes are warm and well perfused, brisk capillary refill  Assessment:    89 y  o female postop day 2 status post closed reduction internal fixation right hip fracture, ABLA  Plan:  · WBAT RLE   · PT/OT  · Pain control prn  · DVT ppx: Lovenox, SCDs   · Medical mangement per SLIM   · Continue to monitor Hgb     · Dispo: Ortho will follow    Belinda Dalton PA-C

## 2018-11-02 NOTE — SPEECH THERAPY NOTE
Speech Language/Pathology    Speech/Language Pathology Progress Note    Patient Name: Ayo Barker  PPZKD'M Date: 11/2/2018     Problem List  Patient Active Problem List   Diagnosis    Word finding difficulty    Confusion    Benign essential hypertension    Memory loss    Hyperlipidemia    Hypothyroidism    Generalized anxiety disorder    Dementia without behavioral disturbance    Anxiety    Ambulatory dysfunction    Coronary artery disease    Vitamin D deficiency    Xerostomia    Closed 2-part intertrochanteric fracture of proximal end of right femur (HCC)    Lytic bone lesion of right femur    Hx of non-ST elevation myocardial infarction (NSTEMI)    History of subdural hemorrhage    Hematoma of parietal scalp    Acute blood loss anemia    Postprocedural hypotension    Thrombocytopenia (HCC)    Hypokalemia    Other dysphagia        Past Medical History  Past Medical History:   Diagnosis Date    Abnormal CT scan, neck     Last Assessed:  5/28/14    Altered mental status 11/22/2017    Anxiety     Basal cell adenocarcinoma     Benign essential hypertension     Last Assessed:  1/14/13    Closed 2-part intertrochanteric fracture of proximal end of right femur ([facility] [address] ) 10/30/2018    Coronary artery disease     Dementia 2006    Depression     Diverticulosis     Essential hypertension 11/22/2017    Hyperlipidemia     Hypertension     Hypothyroid 11/22/2017    Hypothyroid     Insomnia     Long term (current) use of antithrombotics/antiplatelets     Last Assessed:  9/23/13    Lytic bone lesion of right femur 10/30/2018    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 ([address] )     MI (myocardial infarction) ([address] )     Last Assessed:  6/11/15    Subdural hematoma, post-traumatic ([address] ) 2014    left sided, treated nonoperatively    Subdural hygroma 2014    s/p subdural hematoma    TIA (transient ischemic attack)     Unstable angina ([address] ) 2015    Vertigo         Past Surgical History  Past Surgical History:   Procedure Laterality Date    BREAST LUMPECTOMY Right     CATARACT EXTRACTION      CHOLECYSTECTOMY      COLONOSCOPY  2003    HYSTERECTOMY      Total abdominal with removal of both ovaries    NC OPEN RX FEMUR FX+INTRAMED ANTOINETTE Right 10/31/2018    Procedure: INSERTION NAIL IM FEMUR ANTEGRADE (TROCHANTERIC); Surgeon: Salinas Barnard DO;  Location: [facility];  Service: Orthopedics         Subjective:  "oh you're the one w/ the curly hair!"  Objective:  Pt seen for po advancement potential  Pt is still confused but I was able to redirect her a bit easier than yesterday  toleratied thinliquids wfl  Initially refused and solid food, then stated she would try some raisin bran  ST let it soak for a while, mastication was wfl but pt had frequent throat clearing and then stated she was full  (tolerated jello and applesauce yesterday)  Tolerated meds crushed in puree  Assessment:  Tolerating thin liquids and meds crushed, throat clearing w/ cold cereal    Plan/Recommendations:  Advance to puree and thin  Crush meds  Will f/u aspiration precautions posted, d/w nurse

## 2018-11-02 NOTE — PLAN OF CARE
Problem: OCCUPATIONAL THERAPY ADULT  Goal: Performs self-care activities at highest level of function for planned discharge setting  See evaluation for individualized goals  Treatment Interventions: ADL retraining, Functional transfer training, UE strengthening/ROM, Cognitive reorientation, Endurance training, Patient/family training, Equipment evaluation/education, Compensatory technique education, Energy conservation, Activityengagement          See flowsheet documentation for full assessment, interventions and recommendations  Limitation: Decreased ADL status, Decreased UE ROM, Decreased UE strength, Decreased Safe judgement during ADL, Decreased cognition, Decreased endurance, Decreased self-care trans, Decreased high-level ADLs  Prognosis: Good  Assessment: Pt is 79 y/o female seen for OT evaluation admit SLA on 10/30/18 s/p closed reduction internal fixation R hip w/ insertion nail IM femur antegrade (trochanteric) 2* s/p fall resulting in closed 2-part intertrochanteric fracture of right femur  Pt WBAT R LE  Comorbidities include: h/o myocardial infarction, anxiety, dementia, hypothyroidism, HTN, CAD, h/o TIA  Pt on 2L O2 for evaluation, no O2 at baseline  Pt is a poor historian w/ dementia at baseline  Per chart, pt was at [facility]  Per pt report, prior to arrival pt was independent w/ ADLS, IADLS, functional transfers and mobility w/ recent use of RW due to increased hip pain  Prior to hip pain pt reports functional mobility w/ no AD  Pt w/ assistance for transportation  Upon evaluation, pt w/ set up self-feeding, MIN assist grooming, MIN assist UB ADLS, MAX assist LB ADLS, MAX assist toileting, MAX assist x2 supine>sit bed mobility w/ VC for hand placement and LE management (/63 supine, /52 EOB), MOD assist x2 sit<>stand functional transfers w/ VC for hand placement, MOD assist x2 functional mobility w/ RW and cues for RW management   Pt seated bedside chair at end of session for breakfast w/ chair alarm intact, /53, SPO2 97% at 2L 02  Pt presents w/ the following deficits impacting occupational performance: impaired cognition (impaired orientation, requires redirection to tasks, impaired STM, decreased carryover of transfer safety, increased processing time), decreased strength and endurance, increased pain and fatigue, decreased balance, decreased activity tolerance, decreased functional reach, impaired trunk control  These deficits functionally impact participation w/ ADLS, IADLS, functional transfers and mobility  Occupational performance areas to address include: dressing, bathing, grooming, functional transfers and mobility w/ RW, formal cognitive assessment, pt education on energy conservation techniques and fall prevention strategies  From an OT standpoint, recommendation at time of d/c is STR       OT Discharge Recommendation: Short Term Rehab  OT - OK to Discharge:  (STR when medically stable)      Comments: Sean Burrows, OTS

## 2018-11-02 NOTE — PROGRESS NOTES
Progress Note - Laurita Rodriguez 9/26/1923, 80 y o  female MRN: [id number]    Unit/Bed#: E2 -01 Encounter: [id number]    Primary Care Provider: Angelina Venegas DO   Date and time admitted to hospital: 10/30/2018  5:11 PM        * Closed 2-part intertrochanteric fracture of proximal end of right femur [facility])   Assessment & Plan    · S/p closed reduction internal fixation for right hip fracture 10/31   · Appears to be due to mechanical fall   · Pain management changed to scheduled Tylenol and oxycodone PRN as no IV access  · DVT prophylaxis per orthopedic surgical team  · S/p transfusion 2 units PRBC  · Resumed clear liquid diet - initially mild dysphagia postoperatively - will advance diet as recommended by Speech therapy  · Patient's ambulatory status is with a walker although she will frequently ambulate without a walker  · Was living at assisted living facility due to need for close monitoring/administration of medication regimens and will need rehab following hip fracture repair     Lytic bone lesion of right femur   Assessment & Plan    · Per H&P, lateral femur film on hip series shows large lytic area of bone and subsequent AP views of the femur show cortical thinning at the distal aspect within the available imaging  · I did not find evidence of a lytic lesion on current imaging  · Discussed with orthopedic surgery and it is felt current fracture is related to mechanical fall and not necessarily to the described lesion     Ambulatory dysfunction   Assessment & Plan    · Patient ambulates with a walker at baseline but is independent with all other ADLs  · Patient will occasionally forget to take her walker with her at times creating a fall risk  · PT/OT consulted     Benign essential hypertension   Assessment & Plan    · Patient was hypotensive postoperatively, received boluses and RBCs, currently normotensive  · Restarted metoprolol with holding parameters     Coronary artery disease   Assessment & Plan Normal ECG, no chest pain  Restarted ASA and metoprolol  Patient tolerated procedure without cardiac complications     Dementia without behavioral disturbance   Assessment & Plan    Patient was able to carry out ADLs at assisted living  She is normally well oriented to situation in her home environement  Patient's mental status appears improved today, she is alert and oriented to self and place  Continue Aricept  Obtained consent for blood transfusion from daughter, Shalonda Ingram  Continue serial exams     Hematoma of parietal scalp   Assessment & Plan    "Right posterior parietal/occipital scalp hematoma" per CT head wo contrast s/p fall  Previously reported subdural hematoma not identified  Serial exams     Hx of non-ST elevation myocardial infarction (NSTEMI)   Assessment & Plan    · Continue ASA 81mg and metoprolol  · Echocardiogram 2017 revealed LVEF:  55%-65% with mild aortic regurgitation, mild tricuspid regurgitation, mild pulmonic valve regurgitation  · NSTEMI May 2014, recurrent chest pain episodes May 2015 with a peak troponin of 1 5 and 3 hospitalizations for chest pain in June 2015 with a peak troponin of 0 76  · Patient currently chest pain free and with a normal ECG, tolerated procedure without cardiac complications     Other dysphagia   Assessment & Plan    Transient postoperatively  Consulted Speech therapy, appreciate input  Will advance diet as advised     Hypokalemia   Assessment & Plan    Due to decrease in PO intake  Oral replacement  Restarted diet, will advance per Speech therapist     Thrombocytopenia ([facility] [address] )   Assessment & Plan    Platelets dropped to 94 this morning  Due to high risk of DVT will continue Lovenox DVT prophylaxis, decreased dose to 30 mg daily  Monitor daily CBC     Postprocedural hypotension   Assessment & Plan    Resolved, was provided IV NS bolus  Monitor BP closely, obtain manual reading  Patient transfused 2 u PRBC     Acute blood loss anemia   Assessment & Plan    S/p closed reduction internal fixation for right hip fracture 10/31  Consent obtained from daughter Manuel Yo due to patient's baseline dementia  Transfuse 2 units PRBC , Lasix 20 mg IV given between the units  Hb at 8 2 today  Monitor CBC  Patient does not have IV access as continued to pull her IVs  Monitor closely without IV access for now          History of subdural hemorrhage   Assessment & Plan    · May 2014  · Managed medically  · Not demonstrated on current CT of the brain     Generalized anxiety disorder   Assessment & Plan    · Continue Lexapro, Depakote q h s , Xanax     Hypothyroidism   Assessment & Plan    · Continue levothyroxine  · Check TSH       VTE Pharmacologic Prophylaxis:   Pharmacologic: Enoxaparin (Lovenox)  Mechanical VTE Prophylaxis in Place: Yes    Patient Centered Rounds: I have performed bedside rounds with nursing staff today  Discussions with Specialists or Other Care Team Provider: Orthopedic surgery    Education and Discussions with Family / Patient: will update family    Time Spent for Care: 30 minutes  More than 50% of total time spent on counseling and coordination of care as described above  Current Length of Stay: 3 day(s)    Current Patient Status: Inpatient   Certification Statement: The patient will continue to require additional inpatient hospital stay due to need for close monitoring     Discharge Plan: TBD    Code Status: Level 3 - DNAR and DNI      Subjective:   Patient seen and examined  She is c/o pain in right hip and is asking for help  She is alert and oriented to self and place  The patient pulled out her IV line and is currently without IV access  Objective:     Vitals:   Temp (24hrs), Av 4 °F (36 9 °C), Min:96 5 °F (35 8 °C), Max:99 3 °F (37 4 °C)    Temp:  [96 5 °F (35 8 °C)-99 3 °F (37 4 °C)] 96 5 °F (35 8 °C)  HR:  [62-97] 67  Resp:  [18-19] 18  BP: (104-149)/(41-68) 113/48  SpO2:  [94 %-100 %] 94 %  Body mass index is 19 6 kg/m²       Input and Output Summary (last 24 hours): Intake/Output Summary (Last 24 hours) at 11/02/18 1035  Last data filed at 11/02/18 0900   Gross per 24 hour   Intake              330 ml   Output              794 ml   Net             -464 ml       Physical Exam:     Physical Exam   Constitutional: No distress  HENT:   Head: Normocephalic and atraumatic  Eyes: Conjunctivae are normal    Neck: No JVD present  Cardiovascular: Normal rate and regular rhythm  No murmur heard  Pulmonary/Chest: Effort normal  No respiratory distress  She has no wheezes  She has no rales  Abdominal: Soft  She exhibits no distension  There is no tenderness  There is no guarding  Musculoskeletal: She exhibits no edema  Neurological: She is alert  Skin: Skin is warm and dry  Psychiatric: Cognition and memory are impaired  Additional Data:     Labs:      Results from last 7 days  Lab Units 11/02/18  0803   WBC Thousand/uL 8 76   HEMOGLOBIN g/dL 8 2*   HEMATOCRIT % 25 3*   PLATELETS Thousands/uL 94*   NEUTROS PCT % 73   LYMPHS PCT % 16   MONOS PCT % 11   EOS PCT % 0       Results from last 7 days  Lab Units 11/02/18  0803  10/30/18  1855   POTASSIUM mmol/L 3 2*  < > 3 9   CHLORIDE mmol/L 107  < > 104   CO2 mmol/L 31  < > 30   BUN mg/dL 28*  < > 22   CREATININE mg/dL 0 87  < > 0 74   ANION GAP mmol/L 4  < > 9   CALCIUM mg/dL 7 8*  < > 9 3   ALBUMIN g/dL  --   --  3 5   TOTAL BILIRUBIN mg/dL  --   --  0 40   ALK PHOS U/L  --   --  57   ALT U/L  --   --  18   AST U/L  --   --  19   < > = values in this interval not displayed  * I Have Reviewed All Lab Data Listed Above  * Additional Pertinent Lab Tests Reviewed:  Sj [address] Admission Reviewed    Imaging:    Imaging Reports Reviewed Today Include: no new      Recent Cultures (last 7 days):           Last 24 Hours Medication List:     Current Facility-Administered Medications:  acetaminophen 975 mg Oral Q8H [address] Geovanna Osborn MD   ALPRAZolam 0 25 mg Oral BID PRN Mckay eWn PA-C   aluminum-magnesium hydroxide-simethicone 30 mL Oral Q6H PRN Belinda Dalton PA-C   aspirin 81 mg Oral Daily Anu Peralta MD   calcium carbonate 1,000 mg Oral Daily PRN Belinda Dalton PA-C   divalproex sodium 250 mg Oral HS Anu Peralta MD   donepezil 10 mg Oral HS Anu Peralta MD   [START ON 11/3/2018] enoxaparin 30 mg Subcutaneous Daily Anu Peralta MD   escitalopram 10 mg Oral Daily Anu Peralta MD   levothyroxine 75 mcg Oral Early Morning Anu Peralta MD   metoprolol tartrate 25 mg Oral Q12H [address] Geovanna Osborn MD   oxyCODONE 2 5 mg Oral Q4H PRN Anu Peralta MD   oxyCODONE 5 mg Oral Q4H PRN Anu Peralta MD   potassium chloride 20 mEq Oral BID Anu Peralta MD   simethicone 80 mg Oral 4x Daily PRN Belinda Dalton PA-C        Today, Patient Was Seen By: Lenny Lutz MD    ** Please Note: Dictation voice to text software may have been used in the creation of this document   **

## 2018-11-02 NOTE — ASSESSMENT & PLAN NOTE
· Patient ambulates with a walker at baseline but is independent with all other ADLs  · Patient will occasionally forget to take her walker with her at times creating a fall risk  · PT/OT consulted

## 2018-11-02 NOTE — DISCHARGE INSTR - DIET
VBS done 11/6/18  Summary:  Pt presented w/ mild oral stage and moderate pharyngeal dysphagia  Mildy weak transfer w/ mild posterior lingual residue  The epiglottis does not invert  The pt had both vallecular and pyriform retention throughout the study, as well as a posterior pharyngeal wall coating  Inconsistent response to inconsistent aspiration on thin liquids  Silent aspiration on puree at the end of the study  Hyoid excursion was variable, weak at times  Pharyngeal constriction was at least midly weak  Postural maneuvers did not improve swallow/clearance of PO  Pt may be at risk for intermittent aspiration of all consistencies  Images are on PACS      Recommendations:  Diet: trial level 2 mechanical soft  Nothing DRY  (if not tolerating may need to resume puree)  Liquids: nectar  Meds:CRUSH  Strategies: refrain from speaking w/ food in mouth, limit distractions, swallow x 2, cue to cough periodically as precaution, avoid neck extension, "swallow hard"  Upright position  F/u ST tx: yes  Therapy Prognosis: fair  May improve as overall strength improves  Prognosis considerations: difficulty following cues, needs repetition, aspiration is silent at times  Full Supervision  Aspiration Precautions  Consider consult with: nutrition  Results reviewed with: pt, nursing, physician  Aspiration precautions posted      Patient's goal:  "i want to go back to my room"     Goals:  Pt will tolerate least restrictive diet w/out s/s aspiration or oral/pharyngeal difficulties  To reduce aspiration risk:  Pt will swallow x 2 80% x to clear retention  Pt will refrain from speaking w/ po in her mouth w/ min cues  Pt will keep head at midline while eating/drinking w/ min cues  Pt will cough periodically throughout meal w/ cues as precaution to expel possible penetration/aspiration  Pt will perform at least 5 ome's per session w/ mod resistance to improve strength and ROM     Pt will tolerate level 2 mechanical soft w/ nectar thick liquids w/out s/s aspiration       Pt is a 89yof referred for VBS following upgraded trial yesterday  Pt reported in the past that she needed her "lettuce cut small" and has to "dunk my sandwich"  Previous VBS:  None known  Current Diet:  Puree w/ thin  Premorbid diet:  ? Regular w/ modifications  Dentition:  Has most of her teeth  O2 requirement:  none  Vocal Quality/Speech:  Goes off on a tangent about unrelated topics, needs redirection  Cognitive status:  Pleasantly confused but is able to make needs known     Consistencies administered: Barium laden applesauce, soft solid soaked in barium, hard solid, nectar thick, thin liquids, 1/2 13mm barium pill in puree but she chewed it  Liquids were administered by tsp x 1 and straw       Pt was seated laterally at 90 degrees  Needed frequent repositioning     Oral stage:  MILD  Lip closure:wfl  Mastication:wfl, mildly prolonged  Bolus formation: mildly reduced w/ liquids  Bolus control:mildly reduced w/ liquids  Transfer:mildly weak  Residue: posterior lingual     Pharyngeal stage:  MOD  Swallow promptness: prompt to mild delay  Did not always perform a secondary swallow to clear retention  Spill to valleculae: liquids,inconsistent  Spill to pyriforms:liquids, inconsistent  Epiglottic inversion: none/incomplete  Laryngeal rise: fair, variable  Pharyngeal constriction: mildly weak  Vallecular retention: all trials, greatest w/ puree/soft/hard solid  Pyriform retention: noted more so after the initial swallow  PPW coating: yes  Osteophytes: +  Transient penetration: liquids, inconsistent otherwise  Epiglottic undercoat:mostly w/ puree and liquids  Penetration: thin, nectar (less w/ nectar)  Aspiration: thin, puree at end of study (not when given at start of study)  Strategies: neck flexion and head rotation were not effective, secondary swallow cleared partial residue  Cued cough cleared penetration and ? Partial aspiration   ? If effortful swallow made any signif change  Response to aspiration: inconsistent     Screening of Esophageal stage:  Dysmotility and retropulsion observed at beginning of study  Retention: mild   unable to view distal esophagus

## 2018-11-02 NOTE — ASSESSMENT & PLAN NOTE
Normal ECG, no chest pain  Restarted ASA and metoprolol  Patient tolerated procedure without cardiac complications

## 2018-11-02 NOTE — ASSESSMENT & PLAN NOTE
Platelets dropped to 94 this morning  Due to high risk of DVT will continue Lovenox DVT prophylaxis, decreased dose to 30 mg daily  Monitor daily CBC

## 2018-11-02 NOTE — ASSESSMENT & PLAN NOTE
· S/p closed reduction internal fixation for right hip fracture 10/31   · Appears to be due to mechanical fall   · Pain management changed to scheduled Tylenol and oxycodone PRN as no IV access  · DVT prophylaxis per orthopedic surgical team  · S/p transfusion 2 units PRBC  · Resumed clear liquid diet - initially mild dysphagia postoperatively - will advance diet as recommended by Speech therapy  · Patient's ambulatory status is with a walker although she will frequently ambulate without a walker  · Was living at assisted living facility due to need for close monitoring/administration of medication regimens and will need rehab following hip fracture repair

## 2018-11-02 NOTE — SOCIAL WORK
CM received call back from patient's daughter Dellar Card, daughter is requesting a referral to [facility]  CM educated daughter on providing additional choices- CM left preferred provider list at bedside and educated daughter on benefits of staying in network  Due to patient being from [facility] first choice is still [facility]  Preferred provider list left at bedside for daughter to review when visiting  CM following as needed

## 2018-11-02 NOTE — PROGRESS NOTES
Patient was ordered 40 mg of Lovenox  Spoke with Dr Chema Briseno  Instructed to hold dose  Clarissa said she would be changing the dose  Informed oncoming nurse of anticipated change at this time

## 2018-11-03 LAB
ANION GAP SERPL CALCULATED.3IONS-SCNC: 5 MMOL/L (ref 4–13)
BASOPHILS # BLD AUTO: 0.04 THOUSANDS/ΜL (ref 0–0.1)
BASOPHILS NFR BLD AUTO: 1 % (ref 0–1)
BUN SERPL-MCNC: 26 MG/DL (ref 5–25)
CALCIUM SERPL-MCNC: 8.2 MG/DL (ref 8.3–10.1)
CHLORIDE SERPL-SCNC: 108 MMOL/L (ref 100–108)
CO2 SERPL-SCNC: 31 MMOL/L (ref 21–32)
CREAT SERPL-MCNC: 0.65 MG/DL (ref 0.6–1.3)
EOSINOPHIL # BLD AUTO: 0.09 THOUSAND/ΜL (ref 0–0.61)
EOSINOPHIL NFR BLD AUTO: 1 % (ref 0–6)
ERYTHROCYTE [DISTWIDTH] IN BLOOD BY AUTOMATED COUNT: 16.1 % (ref 11.6–15.1)
GFR SERPL CREATININE-BSD FRML MDRD: 76 ML/MIN/1.73SQ M
GLUCOSE SERPL-MCNC: 108 MG/DL (ref 65–140)
HCT VFR BLD AUTO: 25.8 % (ref 34.8–46.1)
HGB BLD-MCNC: 8.5 G/DL (ref 11.5–15.4)
IMM GRANULOCYTES # BLD AUTO: 0.03 THOUSAND/UL (ref 0–0.2)
IMM GRANULOCYTES NFR BLD AUTO: 0 % (ref 0–2)
LYMPHOCYTES # BLD AUTO: 1.04 THOUSANDS/ΜL (ref 0.6–4.47)
LYMPHOCYTES NFR BLD AUTO: 12 % (ref 14–44)
MCH RBC QN AUTO: 32.2 PG (ref 26.8–34.3)
MCHC RBC AUTO-ENTMCNC: 32.9 G/DL (ref 31.4–37.4)
MCV RBC AUTO: 98 FL (ref 82–98)
MONOCYTES # BLD AUTO: 0.84 THOUSAND/ΜL (ref 0.17–1.22)
MONOCYTES NFR BLD AUTO: 10 % (ref 4–12)
NEUTROPHILS # BLD AUTO: 6.53 THOUSANDS/ΜL (ref 1.85–7.62)
NEUTS SEG NFR BLD AUTO: 76 % (ref 43–75)
NRBC BLD AUTO-RTO: 0 /100 WBCS
PLATELET # BLD AUTO: 104 THOUSANDS/UL (ref 149–390)
PMV BLD AUTO: 10 FL (ref 8.9–12.7)
POTASSIUM SERPL-SCNC: 3.9 MMOL/L (ref 3.5–5.3)
RBC # BLD AUTO: 2.64 MILLION/UL (ref 3.81–5.12)
SODIUM SERPL-SCNC: 144 MMOL/L (ref 136–145)
WBC # BLD AUTO: 8.57 THOUSAND/UL (ref 4.31–10.16)

## 2018-11-03 PROCEDURE — 99232 SBSQ HOSP IP/OBS MODERATE 35: CPT | Performed by: FAMILY MEDICINE

## 2018-11-03 PROCEDURE — 99024 POSTOP FOLLOW-UP VISIT: CPT | Performed by: ORTHOPAEDIC SURGERY

## 2018-11-03 PROCEDURE — 85025 COMPLETE CBC W/AUTO DIFF WBC: CPT | Performed by: PHYSICIAN ASSISTANT

## 2018-11-03 PROCEDURE — 80048 BASIC METABOLIC PNL TOTAL CA: CPT | Performed by: PHYSICIAN ASSISTANT

## 2018-11-03 RX ADMIN — DIVALPROEX SODIUM 250 MG: 250 TABLET, DELAYED RELEASE ORAL at 21:25

## 2018-11-03 RX ADMIN — ACETAMINOPHEN 975 MG: 325 TABLET, FILM COATED ORAL at 21:25

## 2018-11-03 RX ADMIN — ENOXAPARIN SODIUM 30 MG: 40 INJECTION SUBCUTANEOUS at 13:12

## 2018-11-03 RX ADMIN — POTASSIUM CHLORIDE 20 MEQ: 20 SOLUTION ORAL at 21:22

## 2018-11-03 RX ADMIN — METOPROLOL TARTRATE 25 MG: 25 TABLET ORAL at 21:22

## 2018-11-03 RX ADMIN — ESCITALOPRAM OXALATE 10 MG: 10 TABLET ORAL at 13:12

## 2018-11-03 RX ADMIN — DONEPEZIL HYDROCHLORIDE 10 MG: 10 TABLET, FILM COATED ORAL at 21:25

## 2018-11-03 RX ADMIN — ACETAMINOPHEN 975 MG: 325 TABLET, FILM COATED ORAL at 13:11

## 2018-11-03 RX ADMIN — ASPIRIN 81 MG: 81 TABLET, CHEWABLE ORAL at 13:11

## 2018-11-03 RX ADMIN — POTASSIUM CHLORIDE 20 MEQ: 20 SOLUTION ORAL at 13:11

## 2018-11-03 NOTE — ASSESSMENT & PLAN NOTE
Resolved, was provided IV NS bolus  Monitor BP closely, obtain manual reading  Patient transfused 2 u PRBC with hemoglobin subsequently remaining stable

## 2018-11-03 NOTE — ASSESSMENT & PLAN NOTE
· Patient ambulates with a walker at baseline but is independent with all other ADLs  · Patient will occasionally forget to take her walker with her at times creating a fall risk  · PT/OT consulted  · Anticipate discharge to SNF

## 2018-11-03 NOTE — SOCIAL WORK
Call received from patient's daughter Jack Cr with additional STR choices  Along with [employer], additional choices are [facility]; [facility] and [facility]  Referrals submitted  Cm provided Whitley with SNF application for [facility]  No other needs at present  Cm to follow as needed

## 2018-11-03 NOTE — PROGRESS NOTES
Progress Note - Satya Payor 9/26/1923, 80 y o  female MRN: [id number]    Unit/Bed#: E2 -01 Encounter: [id number]    Primary Care Provider: Agustina Elaine DO   Date and time admitted to hospital: 10/30/2018  5:11 PM        * Closed 2-part intertrochanteric fracture of proximal end of right femur [facility])   Assessment & Plan    · S/p closed reduction internal fixation for right hip fracture 10/31   · Appears to be due to mechanical fall   · Continue pain management with scheduled Tylenol and oxycodone PRN as no IV access  · DVT prophylaxis per orthopedic surgical team  · S/p transfusion 2 units PRBC  · Advanced diet to pureed with thin liquids as recommended by Speech therapy  · Patient's ambulatory status is with a walker although she will frequently ambulate without a walker  · Was living at assisted living facility due to need for close monitoring/administration of medication regimens and will need rehab following hip fracture repair     Lytic bone lesion of right femur   Assessment & Plan    · Per H&P, lateral femur film on hip series shows large lytic area of bone and subsequent AP views of the femur show cortical thinning at the distal aspect within the available imaging  · I did not find evidence of a lytic lesion on current imaging  · Discussed with orthopedic surgery and it is felt current fracture is related to mechanical fall and not necessarily to the described lesion     Ambulatory dysfunction   Assessment & Plan    · Patient ambulates with a walker at baseline but is independent with all other ADLs  · Patient will occasionally forget to take her walker with her at times creating a fall risk  · PT/OT consulted  · Anticipate discharge to [facility]     Benign essential hypertension   Assessment & Plan    · Patient was hypotensive postoperatively, received boluses and RBCs, remains normotensive since  · Restarted metoprolol with holding parameters     Coronary artery disease   Assessment & Plan    Normal ECG, no chest pain  Restarted ASA and metoprolol  Patient tolerated procedure without cardiac complications     Dementia without behavioral disturbance   Assessment & Plan    Patient was able to carry out ADLs at assisted living  She is normally well oriented to situation in her home environement  Patient's mental status continues to improve nearing baseline  Continue Aricept       Hematoma of parietal scalp   Assessment & Plan    "Right posterior parietal/occipital scalp hematoma" per CT head wo contrast s/p fall  Previously reported subdural hematoma not identified  Serial exams     Hx of non-ST elevation myocardial infarction (NSTEMI)   Assessment & Plan    · Continue ASA 81mg and metoprolol  · Echocardiogram 2017 revealed LVEF:  55%-65% with mild aortic regurgitation, mild tricuspid regurgitation, mild pulmonic valve regurgitation  · NSTEMI May 2014, recurrent chest pain episodes May 2015 with a peak troponin of 1 5 and 3 hospitalizations for chest pain in June 2015 with a peak troponin of 0 76  · Patient currently chest pain free and with a normal ECG, tolerated procedure without cardiac complications     Other dysphagia   Assessment & Plan    Transient postoperatively  Consulted Speech therapy, appreciate input  Diet advanced to pureed with thin liquid diet     Hypokalemia   Assessment & Plan    Resolved  Initially due to decrease in PO intake which has now improved       Thrombocytopenia (HCC)   Assessment & Plan    Platelets dropped to 104 this morning  Due to high risk of DVT will continue Lovenox DVT prophylaxis, decreased dose to 30 mg daily  Monitor daily CBC     Postprocedural hypotension   Assessment & Plan    Resolved, was provided IV NS bolus  Monitor BP closely, obtain manual reading  Patient transfused 2 u PRBC with hemoglobin subsequently remaining stable     Acute blood loss anemia   Assessment & Plan    S/p closed reduction internal fixation for right hip fracture 10/31  Transfused 2 units PRBC , Lasix 20 mg IV given between the units  Hb stable at 8 5 today  Monitor CBC  Patient does not have IV access as continued to pull her IVs  Monitor closely without IV access for now          History of subdural hemorrhage   Assessment & Plan    · May 2014  · Managed medically  · Not demonstrated on current CT of the brain     Generalized anxiety disorder   Assessment & Plan    · Continue Lexapro, Depakote q h s , Xanax     Hypothyroidism   Assessment & Plan    · Controlled  · Continue levothyroxine         VTE Pharmacologic Prophylaxis:   Pharmacologic: Enoxaparin (Lovenox)  Mechanical VTE Prophylaxis in Place: Yes    Patient Centered Rounds: I have performed bedside rounds with nursing staff today  Discussions with Specialists or Other Care Team Provider: case management    Education and Discussions with Family / Patient: daughter Whitley    Time Spent for Care: 30 minutes  More than 50% of total time spent on counseling and coordination of care as described above  Current Length of Stay: 4 day(s)    Current Patient Status: Inpatient   Certification Statement: The patient will continue to require additional inpatient hospital stay due to need for close monitoring, discharge planning    Discharge Plan: Monday to rehab    Code Status: Level 3 - DNAR and DNI      Subjective:   Patient seen and examined  She is alert and awake, denies any a right hip pain at rest but does complain of pain when being turned  She is afebrile  No overnight events  Daughter Bree Foster is in the room and per the daughter, patient's mental status is near her baseline    Objective:     Vitals:   Temp (24hrs), Av 9 °F (36 6 °C), Min:97 7 °F (36 5 °C), Max:98 1 °F (36 7 °C)    Temp:  [97 7 °F (36 5 °C)-98 1 °F (36 7 °C)] 98 °F (36 7 °C)  HR:  [57-61] 60  Resp:  [16-18] 16  BP: (103-129)/(50-64) 105/52  SpO2:  [98 %-99 %] 98 %  Body mass index is 19 6 kg/m²  Input and Output Summary (last 24 hours):        Intake/Output Summary (Last 24 hours) at 11/03/18 1138  Last data filed at 11/03/18 0850   Gross per 24 hour   Intake                0 ml   Output              464 ml   Net             -464 ml       Physical Exam:     Physical Exam   Constitutional: No distress  HENT:   Head: Normocephalic and atraumatic  Eyes: Conjunctivae are normal    Neck: No JVD present  Cardiovascular: Normal rate and regular rhythm  No murmur heard  Pulmonary/Chest: Effort normal  No respiratory distress  She has no wheezes  She has no rales  Abdominal: Soft  She exhibits no distension  There is no tenderness  There is no guarding  Musculoskeletal: She exhibits no edema  Neurological: She is alert  Skin: Skin is warm and dry  Psychiatric: Cognition and memory are impaired  Additional Data:     Labs:      Results from last 7 days  Lab Units 11/03/18  0436   WBC Thousand/uL 8 57   HEMOGLOBIN g/dL 8 5*   HEMATOCRIT % 25 8*   PLATELETS Thousands/uL 104*   NEUTROS PCT % 76*   LYMPHS PCT % 12*   MONOS PCT % 10   EOS PCT % 1       Results from last 7 days  Lab Units 11/03/18  0436  10/30/18  1855   POTASSIUM mmol/L 3 9  < > 3 9   CHLORIDE mmol/L 108  < > 104   CO2 mmol/L 31  < > 30   BUN mg/dL 26*  < > 22   CREATININE mg/dL 0 65  < > 0 74   ANION GAP mmol/L 5  < > 9   CALCIUM mg/dL 8 2*  < > 9 3   ALBUMIN g/dL  --   --  3 5   TOTAL BILIRUBIN mg/dL  --   --  0 40   ALK PHOS U/L  --   --  57   ALT U/L  --   --  18   AST U/L  --   --  19   < > = values in this interval not displayed  * I Have Reviewed All Lab Data Listed Above  * Additional Pertinent Lab Tests Reviewed:  Sj 66 Admission Reviewed    Imaging:    Imaging Reports Reviewed Today Include: no new      Recent Cultures (last 7 days):           Last 24 Hours Medication List:     Current Facility-Administered Medications:  acetaminophen 975 mg Oral Q8H [address] Geovanna Osborn MD   ALPRAZolam 0 25 mg Oral BID PRN Chon Blake PA-C   aluminum-magnesium hydroxide-simethicone 30 mL Oral Q6H PRN Belinda Dalton PA-C   aspirin 81 mg Oral Daily Merline Richters, MD   calcium carbonate 1,000 mg Oral Daily PRN Belinda Dalton PA-C   divalproex sodium 250 mg Oral HS Merline Richters, MD   donepezil 10 mg Oral HS Geovanna Osborn MD   enoxaparin 30 mg Subcutaneous Daily Merline Richters, MD   escitalopram 10 mg Oral Daily Merline Richters, MD   levothyroxine 75 mcg Oral Early Morning Merline Richters, MD   metoprolol tartrate 25 mg Oral Q12H [facility] Geovanna Osborn MD   oxyCODONE 2 5 mg Oral Q4H PRN Merline Richters, MD   oxyCODONE 5 mg Oral Q4H PRN Merline Richters, MD   potassium chloride 20 mEq Oral BID Merline Richters, MD   simethicone 80 mg Oral 4x Daily PRN Belinda Dalton PA-C        Today, Patient Was Seen By: Lisseth Nunes MD    ** Please Note: Dictation voice to text software may have been used in the creation of this document   **

## 2018-11-03 NOTE — ASSESSMENT & PLAN NOTE
Patient was able to carry out ADLs at assisted living  She is normally well oriented to situation in her home environement  Patient's mental status continues to improve nearing baseline  Continue Aricept

## 2018-11-03 NOTE — ASSESSMENT & PLAN NOTE
S/p closed reduction internal fixation for right hip fracture 10/31  Transfused 2 units PRBC 11/1, Lasix 20 mg IV given between the units  Hb stable at 8 5 today  Monitor CBC  Patient does not have IV access as continued to pull her IVs  Monitor closely without IV access for now

## 2018-11-03 NOTE — ASSESSMENT & PLAN NOTE
Platelets dropped to 104 this morning  Due to high risk of DVT will continue Lovenox DVT prophylaxis, decreased dose to 30 mg daily  Monitor daily CBC

## 2018-11-03 NOTE — ASSESSMENT & PLAN NOTE
· Patient was hypotensive postoperatively, received boluses and RBCs, remains normotensive since  · Restarted metoprolol with holding parameters

## 2018-11-03 NOTE — ASSESSMENT & PLAN NOTE
· S/p closed reduction internal fixation for right hip fracture 10/31   · Appears to be due to mechanical fall   · Continue pain management with scheduled Tylenol and oxycodone PRN as no IV access  · DVT prophylaxis per orthopedic surgical team  · S/p transfusion 2 units PRBC  · Advanced diet to pureed with thin liquids as recommended by Speech therapy  · Patient's ambulatory status is with a walker although she will frequently ambulate without a walker  · Was living at assisted living facility due to need for close monitoring/administration of medication regimens and will need rehab following hip fracture repair

## 2018-11-03 NOTE — ASSESSMENT & PLAN NOTE
Transient postoperatively  Consulted Speech therapy, appreciate input  Diet advanced to pureed with thin liquid diet

## 2018-11-03 NOTE — PROGRESS NOTES
Progress Note - Orthopedics   Malka Preciado 80 y o  female MRN: [id number]  Unit/Bed#: E2 -01 Encounter: [id number]    Assessment:  80 y o  female s/p closed reduction internal fixation for right hip fracture  POD 3, ABLA    Plan:  Pain control prn  PT/OT-WBAT right LE   Lovenox/TEDs/SCDs for DVT prophylaxis-will need lovenox x 4 weeks from date of surgery  Maintain dressing x 7 days from date of surgery, then dressing changes daily  Ortho stable- f/u in 2 weeks from date of surgery    Subjective: Pt S&E  Resting comfortably  Family at bedside  Vitals: Blood pressure 105/52, pulse 60, temperature 98 °F (36 7 °C), temperature source Tympanic, resp  rate 16, height 5' 3" (1 6 m), weight 50 2 kg (110 lb 10 7 oz), SpO2 98 %  ,Body mass index is 19 6 kg/m²        Intake/Output Summary (Last 24 hours) at 11/03/18 1029  Last data filed at 11/03/18 0850   Gross per 24 hour   Intake                0 ml   Output              464 ml   Net             -464 ml       Invasive Devices          No matching active lines, drains, or airways          Ortho Exam: rightLE:  Drsg:  C/D/I, sensation grossly intact L4, L5, S1, brisk capillary refill, EHL/AT/GS intact    Lab, Imaging and other studies:   CBC:   Lab Results   Component Value Date    WBC 8 57 11/03/2018    HGB 8 5 (L) 11/03/2018    HCT 25 8 (L) 11/03/2018    MCV 98 11/03/2018     (L) 11/03/2018    MCH 32 2 11/03/2018    MCHC 32 9 11/03/2018    RDW 16 1 (H) 11/03/2018    MPV 10 0 11/03/2018    NRBC 0 11/03/2018     CMP:   Lab Results   Component Value Date     11/03/2018    CO2 31 11/03/2018    BUN 26 (H) 11/03/2018    CREATININE 0 65 11/03/2018    CALCIUM 8 2 (L) 11/03/2018    EGFR 76 11/03/2018

## 2018-11-04 LAB
ANION GAP SERPL CALCULATED.3IONS-SCNC: 3 MMOL/L (ref 4–13)
BASOPHILS # BLD AUTO: 0.02 THOUSANDS/ΜL (ref 0–0.1)
BASOPHILS NFR BLD AUTO: 0 % (ref 0–1)
BUN SERPL-MCNC: 19 MG/DL (ref 5–25)
CALCIUM SERPL-MCNC: 8.3 MG/DL (ref 8.3–10.1)
CHLORIDE SERPL-SCNC: 107 MMOL/L (ref 100–108)
CO2 SERPL-SCNC: 32 MMOL/L (ref 21–32)
CREAT SERPL-MCNC: 0.54 MG/DL (ref 0.6–1.3)
EOSINOPHIL # BLD AUTO: 0.42 THOUSAND/ΜL (ref 0–0.61)
EOSINOPHIL NFR BLD AUTO: 6 % (ref 0–6)
ERYTHROCYTE [DISTWIDTH] IN BLOOD BY AUTOMATED COUNT: 15.3 % (ref 11.6–15.1)
GFR SERPL CREATININE-BSD FRML MDRD: 80 ML/MIN/1.73SQ M
GLUCOSE SERPL-MCNC: 93 MG/DL (ref 65–140)
HCT VFR BLD AUTO: 25.4 % (ref 34.8–46.1)
HGB BLD-MCNC: 8.2 G/DL (ref 11.5–15.4)
IMM GRANULOCYTES # BLD AUTO: 0.03 THOUSAND/UL (ref 0–0.2)
IMM GRANULOCYTES NFR BLD AUTO: 0 % (ref 0–2)
LYMPHOCYTES # BLD AUTO: 1.49 THOUSANDS/ΜL (ref 0.6–4.47)
LYMPHOCYTES NFR BLD AUTO: 22 % (ref 14–44)
MCH RBC QN AUTO: 31.5 PG (ref 26.8–34.3)
MCHC RBC AUTO-ENTMCNC: 32.3 G/DL (ref 31.4–37.4)
MCV RBC AUTO: 98 FL (ref 82–98)
MONOCYTES # BLD AUTO: 0.64 THOUSAND/ΜL (ref 0.17–1.22)
MONOCYTES NFR BLD AUTO: 9 % (ref 4–12)
NEUTROPHILS # BLD AUTO: 4.25 THOUSANDS/ΜL (ref 1.85–7.62)
NEUTS SEG NFR BLD AUTO: 63 % (ref 43–75)
NRBC BLD AUTO-RTO: 0 /100 WBCS
PLATELET # BLD AUTO: 132 THOUSANDS/UL (ref 149–390)
PMV BLD AUTO: 9.4 FL (ref 8.9–12.7)
POTASSIUM SERPL-SCNC: 3.6 MMOL/L (ref 3.5–5.3)
RBC # BLD AUTO: 2.6 MILLION/UL (ref 3.81–5.12)
SODIUM SERPL-SCNC: 142 MMOL/L (ref 136–145)
WBC # BLD AUTO: 6.85 THOUSAND/UL (ref 4.31–10.16)

## 2018-11-04 PROCEDURE — 97116 GAIT TRAINING THERAPY: CPT

## 2018-11-04 PROCEDURE — 85025 COMPLETE CBC W/AUTO DIFF WBC: CPT | Performed by: FAMILY MEDICINE

## 2018-11-04 PROCEDURE — 97110 THERAPEUTIC EXERCISES: CPT

## 2018-11-04 PROCEDURE — 99232 SBSQ HOSP IP/OBS MODERATE 35: CPT | Performed by: INTERNAL MEDICINE

## 2018-11-04 PROCEDURE — 80048 BASIC METABOLIC PNL TOTAL CA: CPT | Performed by: FAMILY MEDICINE

## 2018-11-04 PROCEDURE — 97530 THERAPEUTIC ACTIVITIES: CPT

## 2018-11-04 RX ADMIN — METOPROLOL TARTRATE 25 MG: 25 TABLET ORAL at 21:40

## 2018-11-04 RX ADMIN — POTASSIUM CHLORIDE 20 MEQ: 20 SOLUTION ORAL at 09:13

## 2018-11-04 RX ADMIN — LEVOTHYROXINE SODIUM 75 MCG: 75 TABLET ORAL at 05:55

## 2018-11-04 RX ADMIN — ASPIRIN 81 MG: 81 TABLET, CHEWABLE ORAL at 09:12

## 2018-11-04 RX ADMIN — ACETAMINOPHEN 975 MG: 325 TABLET, FILM COATED ORAL at 15:26

## 2018-11-04 RX ADMIN — METOPROLOL TARTRATE 25 MG: 25 TABLET ORAL at 09:12

## 2018-11-04 RX ADMIN — ACETAMINOPHEN 975 MG: 325 TABLET, FILM COATED ORAL at 21:40

## 2018-11-04 RX ADMIN — POTASSIUM CHLORIDE 20 MEQ: 20 SOLUTION ORAL at 17:11

## 2018-11-04 RX ADMIN — ACETAMINOPHEN 975 MG: 325 TABLET, FILM COATED ORAL at 05:55

## 2018-11-04 RX ADMIN — ENOXAPARIN SODIUM 30 MG: 40 INJECTION SUBCUTANEOUS at 09:12

## 2018-11-04 RX ADMIN — DONEPEZIL HYDROCHLORIDE 10 MG: 10 TABLET, FILM COATED ORAL at 21:40

## 2018-11-04 RX ADMIN — DIVALPROEX SODIUM 250 MG: 250 TABLET, DELAYED RELEASE ORAL at 21:40

## 2018-11-04 RX ADMIN — ESCITALOPRAM OXALATE 10 MG: 10 TABLET ORAL at 09:12

## 2018-11-04 RX ADMIN — OXYCODONE HYDROCHLORIDE 2.5 MG: 5 TABLET ORAL at 21:40

## 2018-11-04 NOTE — PROGRESS NOTES
[facility] Internal Medicine Progress Note  Patient: Naif Alexander 80 y o  female   MRN: [id number]  PCP: Mel Ruiz DO  Unit/Bed#: E2 -01 Encounter: [id number]  Date Of Visit: 11/04/18    Assessment:    Principal Problem:    Closed 2-part intertrochanteric fracture of proximal end of right femur ([address] )  Active Problems:    Benign essential hypertension    Hypothyroidism    Generalized anxiety disorder    Dementia without behavioral disturbance    Ambulatory dysfunction    Coronary artery disease    Vitamin D deficiency    Lytic bone lesion of right femur    Hx of non-ST elevation myocardial infarction (NSTEMI)    History of subdural hemorrhage    Hematoma of parietal scalp    Acute blood loss anemia    Postprocedural hypotension    Thrombocytopenia (HCC)    Hypokalemia    Other dysphagia      Plan: This is a 27-year-old female who resides at [facility] admitted on 10/30/2018 after suffering a mechanical fall  Patient uses a walker at home    She sustained a closed intertrochanteric fracture status post closed reduction and internal fixation on 10/31/2018     1 ) Mechanical fall with closed intertrochanteric fracture of right femur  -status post close reduction and internal fixation of right hip on 10/31/2018  -PT OT recommending rehab  -DVT prophylaxis  -orthopedic follow-up appreciated    2 ) Acute blood loss anemia  -likely secondary to recent surgery, status post 2 unit PRBC  -on 11/01/2018 will be today is 8 2, no signs of any active bleeding, will monitor CBC    3 ) Hypertension  -continue with metoprolol, monitor blood pressure    4 ) CAD  -continue with aspirin, metoprolol  -echocardiogram in 2017 revealed ejection fraction 55-65%, mild aortic regurgitation, mild tricuspid regurgitation, mild pulmonic valve regurgitation  -no active chest pain    5 ) Hematoma of parietal scalp  -CT head revealed right posterior parietal/occipital scalp hematoma    6 ) Dysphagia  -likely transient postoperative  -will have speech re-evaluate to advance diet    7 ) Anxiety  -continue with Lexapro, Depakote, Xanax    8 ) Hypothyroidism  -continue levothyroxine    9 ) History of dementia  -mental status at baseline    10 ) Thrombocytopenia  -platelets gradually improving  -will monitor CBC    VTE Pharmacologic Prophylaxis:   Pharmacologic:  Lovenox    Patient Centered Rounds: I have performed bedside rounds with nursing staff today  Education and Discussions with Family / Patient:  Daughter, at bedside    Time Spent for Care: 20 minutes  More than 50% of total time spent on counseling and coordination of care as described above  Current Length of Stay: 5 day(s)    Current Patient Status: Inpatient   Certification Statement: The patient will continue to require additional inpatient hospital stay due to Awaiting placement    Discharge Plan / Estimated Discharge Date: 2-3 days    Code Status: Level 3 - DNAR and DNI      Subjective:   Patient seen and examined at bedside, currently denies any complaints    Objective:     Vitals:   Temp (24hrs), Av 9 °F (36 6 °C), Min:97 7 °F (36 5 °C), Max:98 1 °F (36 7 °C)    Temp:  [97 7 °F (36 5 °C)-98 1 °F (36 7 °C)] 98 1 °F (36 7 °C)  HR:  [57-85] 60  Resp:  [15-18] 15  BP: (115-148)/(57-65) 140/62  SpO2:  [91 %-98 %] 95 %  Body mass index is 19 6 kg/m²  Input and Output Summary (last 24 hours): Intake/Output Summary (Last 24 hours) at 18 1125  Last data filed at 18 0912   Gross per 24 hour   Intake                0 ml   Output             1462 ml   Net            -1462 ml       Physical Exam:    Constitutional: Patient is in no acute distress  HEENT:  Normocephalic, atraumatic, EOMI, PERRLA, no scleral icterus, no pallor, moist oral mucosa  Neck:  Supple, no masses, no thyromegaly, no bruits Normal range of motion  Lymph nodes:  No lymphadenopathy  Cardiovascular: Normal S1S2, RRR, No murmurs/rubs/gallops appreciated    Pulmonary: Clear to auscultation bilaterally, No rhonchi/rales/wheezing appreciated  Abdominal: Soft, Bowel sounds present, Non-tender, Non-distended, No rebound/guarding, no hepatomegaly   Musculoskeletal:  Right lower extremity with dressing in place  Extremities:  No cyanosis, clubbing or edema  Peripheral pulses palpable and equal bilaterally  Neurological: Cranial nerves II-XII grossly intact, sensation intact, otherwise no focal neurological symptoms  Skin: Skin is warm and dry, no rashes  Additional Data:     Labs:      Results from last 7 days  Lab Units 11/04/18  0535   WBC Thousand/uL 6 85   HEMOGLOBIN g/dL 8 2*   HEMATOCRIT % 25 4*   PLATELETS Thousands/uL 132*   NEUTROS PCT % 63   LYMPHS PCT % 22   MONOS PCT % 9   EOS PCT % 6       Results from last 7 days  Lab Units 11/04/18  0536  10/30/18  1855   POTASSIUM mmol/L 3 6  < > 3 9   CHLORIDE mmol/L 107  < > 104   CO2 mmol/L 32  < > 30   BUN mg/dL 19  < > 22   CREATININE mg/dL 0 54*  < > 0 74   CALCIUM mg/dL 8 3  < > 9 3   ALK PHOS U/L  --   --  57   ALT U/L  --   --  18   AST U/L  --   --  19   < > = values in this interval not displayed  I Have Reviewed All Lab Data Listed Above        Recent Cultures (last 7 days):           Last 24 Hours Medication List:     Current Facility-Administered Medications:  acetaminophen 975 mg Oral Q8H [address] Geovanna Osborn MD   ALPRAZolam 0 25 mg Oral BID PRN Kristi Dickinson PA-C   aluminum-magnesium hydroxide-simethicone 30 mL Oral Q6H PRN Belinda Dalton PA-C   aspirin 81 mg Oral Daily Merari Mansfield MD   calcium carbonate 1,000 mg Oral Daily PRN Belinda Dalton PA-C   divalproex sodium 250 mg Oral HS Merari Mansfield MD   donepezil 10 mg Oral HS Merari Mansfield MD   enoxaparin 30 mg Subcutaneous Daily Merari Mansfield MD   escitalopram 10 mg Oral Daily Merari Mansfield MD   levothyroxine 75 mcg Oral Early Morning Merari Mansfield MD   metoprolol tartrate 25 mg Oral Q12H [address] Geovanna Osborn MD   oxyCODONE 2 5 mg Oral Q4H PRN Geovanna MD Irena   oxyCODONE 5 mg Oral Q4H PRN Trino Evans MD   potassium chloride 20 mEq Oral BID Trino Evans MD   simethicone 80 mg Oral 4x Daily PRN Belinda Dalton PA-C        Today, Patient Was Seen By: Manjeet Shannon MD

## 2018-11-04 NOTE — SPEECH THERAPY NOTE
Speech Language/Pathology  Call received from RN re: advancing patient to a regular diet  Patient is currently on ST caseload and will be assessed tomorrow for potential upgrade

## 2018-11-04 NOTE — PLAN OF CARE
Problem: PHYSICAL THERAPY ADULT  Goal: Performs mobility at highest level of function for planned discharge setting  See evaluation for individualized goals  Treatment/Interventions: Functional transfer training, LE strengthening/ROM, Elevations, Therapeutic exercise, Endurance training, Patient/family training, Equipment eval/education, Bed mobility, Gait training, Compensatory technique education, Continued evaluation, Spoke to nursing, OT, Spoke to MD  Equipment Recommended: Merlin Sovereign       See flowsheet documentation for full assessment, interventions and recommendations  Outcome: Progressing  Prognosis: Fair  Problem List: Decreased strength, Decreased range of motion, Decreased endurance, Impaired balance, Decreased mobility, Decreased cognition, Decreased safety awareness, Pain, Orthopedic restrictions, Decreased coordination, Impaired judgement, Decreased skin integrity  Assessment: Pt seated out of bed in chair upon arrival  Pt agreeable to PT  Pt performed seated b/l le arom exercises x 10 rps with increased time, moderate verbal and tactile cues for exercise techniques and performance  PT demonstrates impaired ability to perform sit to stand transfers requiring mod assist x1 with moderated cues for handplacement and transfer techniques  Pt demonstrates retropulsion upon standing requiring mod assist x1 to correct  Pt progressed with ambulation distances to 6' with mod assist x1 and standby assist of another with verbal cues for le sequencing, improved posture, ue weightbearing,improved safety and improved gait pattern  Mobility limited by anxiety, fearful of falling, pain and fatigue  PT will continue to benefit from skilled inpt PT and rehab at d/c  Pt remained out of bed in chair at conclusion of PT session scd's applied to b/l le's  Call bell in reach chair alarm activated  Barriers to Discharge:  Other (Comment) (cognition)     Recommendation: Short-term skilled PT     PT - OK to Discharge: Yes (to rehab)    See flowsheet documentation for full assessment

## 2018-11-04 NOTE — PHYSICAL THERAPY NOTE
Physical Therapy Treatment Note     11/04/18 1238   Pain Assessment   Pain Assessment FLACC   Pain Type Surgical pain;Acute pain   Pain Location Leg   Pain Orientation Right   Hospital Pain Intervention(s) Ambulation/increased activity; Emotional support; Rest   Pain Rating: FLACC (Rest) - Face 0   Pain Rating: FLACC (Rest) - Legs 0   Pain Rating: FLACC (Rest) - Activity 0   Pain Rating: FLACC (Rest) - Cry 0   Pain Rating: FLACC (Rest) - Consolability 0   Score: FLACC (Rest) 0   Pain Rating: FLACC (Activity) - Face 1   Pain Rating: FLACC (Activity) - Legs 1   Pain Rating: FLACC (Activity) - Activity 1   Pain Rating: FLACC (Activity) - Cry 1   Pain Rating: FLACC (Activity) - Consolability 1   Score: FLACC (Activity) 5   Restrictions/Precautions   RLE Weight Bearing Per Order WBAT   Other Precautions Cognitive; Chair Alarm;Multiple lines; Fall Risk;Pain   General   Chart Reviewed Yes   Family/Caregiver Present Yes   Cognition   Overall Cognitive Status Impaired   Arousal/Participation Alert; Cooperative   Attention Attends with cues to redirect   Orientation Level Oriented to person;Oriented to situation;Oriented to place   Memory Decreased recall of precautions;Decreased recall of recent events;Decreased short term memory   Following Commands Follows one step commands with increased time or repetition   Subjective   Subjective Pt out of bed in chair upon arrival  tp reports no pain at rest   PT agreeable to PT  Bed Mobility   Additional Comments no observed pt already out of bed in chair upon arrival     Transfers   Sit to Stand 3  Moderate assistance   Additional items Assist x 1; Armrests; Increased time required;Verbal cues  (retropulsion upon standing  )   Stand to Sit 3  Moderate assistance   Additional items Assist x 1; Armrests; Increased time required;Verbal cues   Ambulation/Elevation   Gait pattern Poor UE support; Forward Flexion;Decreased foot clearance; Short stride; Step to;Decreased R stance  (short quick steps  )   Gait Assistance 3  Moderate assist  (standby of another, )   Additional items Verbal cues; Assist x 1   Assistive Device Rolling walker   Distance 6'x1   Balance   Static Sitting Fair +   Static Standing Poor   Dynamic Standing Poor   Ambulatory Poor   Endurance Deficit   Endurance Deficit Yes   Endurance Deficit Description pain, fatigue   Activity Tolerance   Activity Tolerance Patient limited by pain; Patient limited by fatigue  (anxiety, fear of falling)   Nurse Made Aware Rissa Richardson   Exercises   Heelslides Sitting;10 reps;AROM; Right;Bilateral   Glute Sets Sitting;10 reps;AROM; Bilateral   Hip Flexion Sitting;10 reps;AROM; Right   Hip Abduction Sitting;10 reps;AROM; Bilateral   Hip Adduction Sitting;10 reps;AROM; Bilateral  (isometric hip add   )   Knee AROM Long Arc Quad Sitting;10 reps;AROM; Left;Bilateral   Ankle Pumps Sitting;10 reps;AROM; Bilateral   Assessment   Prognosis Fair   Problem List Decreased strength;Decreased range of motion;Decreased endurance; Impaired balance;Decreased mobility; Decreased cognition;Decreased safety awareness;Pain;Orthopedic restrictions;Decreased coordination; Impaired judgement;Decreased skin integrity   Assessment Pt seated out of bed in chair upon arrival  Pt agreeable to PT  Pt performed seated b/l le arom exercises x 10 rps with increased time, moderate verbal and tactile cues for exercise techniques and performance  PT demonstrates impaired ability to perform sit to stand transfers requiring mod assist x1 with moderated cues for handplacement and transfer techniques  Pt demonstrates retropulsion upon standing requiring mod assist x1 to correct  Pt progressed with ambulation distances to 6' with mod assist x1 and standby assist of another with verbal cues for le sequencing, improved posture, ue weightbearing,improved safety and improved gait pattern  Mobility limited by anxiety, fearful of falling, pain and fatigue    PT will continue to benefit from skilled inpt PT and rehab at d/c  Pt remained out of bed in chair at conclusion of PT session scd's applied to b/l le's  Call bell in reach chair alarm activated  Goals   Patient Goals "To be able to walk  "     [facility] Expiration Date 11/12/18   Treatment Day 1   Plan   Treatment/Interventions Functional transfer training;LE strengthening/ROM; Therapeutic exercise; Endurance training;Patient/family training;Equipment eval/education; Bed mobility;Gait training;Spoke to nursing;Family   Progress Slow progress, decreased activity tolerance   PT Frequency 7x/wk;5x/wk; Weekend   Recommendation   Recommendation Short-term skilled PT   PT - OK to Discharge Yes  (to rehab)      11/04/18 1238   Pain Assessment   Pain Assessment FLACC   Pain Type Surgical pain;Acute pain   Pain Location Leg   Pain Orientation [facility] Pain Intervention(s) Ambulation/increased activity; Emotional support; Rest   Pain Rating: FLACC (Rest) - Face 0   Pain Rating: FLACC (Rest) - Legs 0   Pain Rating: FLACC (Rest) - Activity 0   Pain Rating: FLACC (Rest) - Cry 0   Pain Rating: FLACC (Rest) - Consolability 0   Score: FLACC (Rest) 0   Pain Rating: FLACC (Activity) - Face 1   Pain Rating: FLACC (Activity) - Legs 1   Pain Rating: FLACC (Activity) - Activity 1   Pain Rating: FLACC (Activity) - Cry 1   Pain Rating: FLACC (Activity) - Consolability 1   Score: FLACC (Activity) 5   Restrictions/Precautions   RLE Weight Bearing Per Order WBAT   Other Precautions Cognitive; Chair Alarm;Multiple lines; Fall Risk;Pain   General   Chart Reviewed Yes   Family/Caregiver Present Yes   Cognition   Overall Cognitive Status Impaired   Arousal/Participation Alert; Cooperative   Attention Attends with cues to redirect   Orientation Level Oriented to person;Oriented to situation;Oriented to place   Memory Decreased recall of precautions;Decreased recall of recent events;Decreased short term memory   Following Commands Follows one step commands with increased time or repetition Subjective   Subjective Pt out of bed in chair upon arrival  tp reports no pain at rest   PT agreeable to PT  Bed Mobility   Additional Comments no observed pt already out of bed in chair upon arrival     Transfers   Sit to Stand 3  Moderate assistance   Additional items Assist x 1; Armrests; Increased time required;Verbal cues  (retropulsion upon standing  )   Stand to Sit 3  Moderate assistance   Additional items Assist x 1; Armrests; Increased time required;Verbal cues   Ambulation/Elevation   Gait pattern Poor UE support; Forward Flexion;Decreased foot clearance; Short stride; Step to;Decreased R stance  (short quick steps  )   Gait Assistance 3  Moderate assist  (standby of another, )   Additional items Verbal cues; Assist x 1   Assistive Device Rolling walker   Distance 6'x1   Balance   Static Sitting Fair +   Static Standing Poor   Dynamic Standing Poor   Ambulatory Poor   Endurance Deficit   Endurance Deficit Yes   Endurance Deficit Description pain, fatigue   Activity Tolerance   Activity Tolerance Patient limited by pain; Patient limited by fatigue  (anxiety, fear of falling)   Nurse Made Aware Rissa Richardson   Exercises   Heelslides Sitting;10 reps;AROM; Right;Bilateral   Glute Sets Sitting;10 reps;AROM; Bilateral   Hip Flexion Sitting;10 reps;AROM; Right   Hip Abduction Sitting;10 reps;AROM; Bilateral   Hip Adduction Sitting;10 reps;AROM; Bilateral  (isometric hip add   )   Knee AROM Long Arc Quad Sitting;10 reps;AROM; Left;Bilateral   Ankle Pumps Sitting;10 reps;AROM; Bilateral   Assessment   Prognosis Fair   Problem List Decreased strength;Decreased range of motion;Decreased endurance; Impaired balance;Decreased mobility; Decreased cognition;Decreased safety awareness;Pain;Orthopedic restrictions;Decreased coordination; Impaired judgement;Decreased skin integrity   Assessment Pt seated out of bed in chair upon arrival  Pt agreeable to PT    Pt performed seated b/l le arom exercises x 10 rps with increased time, moderate verbal and tactile cues for exercise techniques and performance  PT demonstrates impaired ability to perform sit to stand transfers requiring mod assist x1 with moderated cues for handplacement and transfer techniques  Pt demonstrates retropulsion upon standing requiring mod assist x1 to correct  Pt progressed with ambulation distances to 6' with mod assist x1 and standby assist of another with verbal cues for le sequencing, improved posture, ue weightbearing,improved safety and improved gait pattern  Mobility limited by anxiety, fearful of falling, pain and fatigue  PT will continue to benefit from skilled inpt PT and rehab at d/c  Pt remained out of bed in chair at conclusion of PT session scd's applied to b/l le's  Call bell in reach chair alarm activated  Goals   Patient Goals "To be able to walk  "     [facility] Expiration Date 11/12/18   Treatment Day 1   Plan   Treatment/Interventions Functional transfer training;LE strengthening/ROM; Therapeutic exercise; Endurance training;Patient/family training;Equipment eval/education; Bed mobility;Gait training;Spoke to nursing;Family   Progress Slow progress, decreased activity tolerance   PT Frequency 7x/wk;5x/wk; Weekend   Recommendation   Recommendation Short-term skilled PT   PT - OK to Discharge Yes  (to rehab)       Janell Ball PTA

## 2018-11-05 LAB
ANION GAP SERPL CALCULATED.3IONS-SCNC: 5 MMOL/L (ref 4–13)
BASOPHILS # BLD AUTO: 0.07 THOUSANDS/ΜL (ref 0–0.1)
BASOPHILS NFR BLD AUTO: 1 % (ref 0–1)
BUN SERPL-MCNC: 15 MG/DL (ref 5–25)
CALCIUM SERPL-MCNC: 8.8 MG/DL (ref 8.3–10.1)
CHLORIDE SERPL-SCNC: 104 MMOL/L (ref 100–108)
CO2 SERPL-SCNC: 32 MMOL/L (ref 21–32)
CREAT SERPL-MCNC: 0.54 MG/DL (ref 0.6–1.3)
EOSINOPHIL # BLD AUTO: 0.56 THOUSAND/ΜL (ref 0–0.61)
EOSINOPHIL NFR BLD AUTO: 6 % (ref 0–6)
ERYTHROCYTE [DISTWIDTH] IN BLOOD BY AUTOMATED COUNT: 15.1 % (ref 11.6–15.1)
GFR SERPL CREATININE-BSD FRML MDRD: 80 ML/MIN/1.73SQ M
GLUCOSE SERPL-MCNC: 97 MG/DL (ref 65–140)
HCT VFR BLD AUTO: 29.6 % (ref 34.8–46.1)
HGB BLD-MCNC: 9.4 G/DL (ref 11.5–15.4)
IMM GRANULOCYTES # BLD AUTO: 0.04 THOUSAND/UL (ref 0–0.2)
IMM GRANULOCYTES NFR BLD AUTO: 0 % (ref 0–2)
LYMPHOCYTES # BLD AUTO: 1.41 THOUSANDS/ΜL (ref 0.6–4.47)
LYMPHOCYTES NFR BLD AUTO: 16 % (ref 14–44)
MCH RBC QN AUTO: 31.4 PG (ref 26.8–34.3)
MCHC RBC AUTO-ENTMCNC: 31.8 G/DL (ref 31.4–37.4)
MCV RBC AUTO: 99 FL (ref 82–98)
MONOCYTES # BLD AUTO: 0.93 THOUSAND/ΜL (ref 0.17–1.22)
MONOCYTES NFR BLD AUTO: 10 % (ref 4–12)
NEUTROPHILS # BLD AUTO: 6.1 THOUSANDS/ΜL (ref 1.85–7.62)
NEUTS SEG NFR BLD AUTO: 67 % (ref 43–75)
NRBC BLD AUTO-RTO: 0 /100 WBCS
PLATELET # BLD AUTO: 187 THOUSANDS/UL (ref 149–390)
PMV BLD AUTO: 9.3 FL (ref 8.9–12.7)
POTASSIUM SERPL-SCNC: 3.9 MMOL/L (ref 3.5–5.3)
RBC # BLD AUTO: 2.99 MILLION/UL (ref 3.81–5.12)
SODIUM SERPL-SCNC: 141 MMOL/L (ref 136–145)
WBC # BLD AUTO: 9.11 THOUSAND/UL (ref 4.31–10.16)

## 2018-11-05 PROCEDURE — 97530 THERAPEUTIC ACTIVITIES: CPT

## 2018-11-05 PROCEDURE — 97535 SELF CARE MNGMENT TRAINING: CPT

## 2018-11-05 PROCEDURE — 97110 THERAPEUTIC EXERCISES: CPT

## 2018-11-05 PROCEDURE — 92526 ORAL FUNCTION THERAPY: CPT

## 2018-11-05 PROCEDURE — 85025 COMPLETE CBC W/AUTO DIFF WBC: CPT | Performed by: INTERNAL MEDICINE

## 2018-11-05 PROCEDURE — 97116 GAIT TRAINING THERAPY: CPT

## 2018-11-05 PROCEDURE — 80048 BASIC METABOLIC PNL TOTAL CA: CPT | Performed by: INTERNAL MEDICINE

## 2018-11-05 PROCEDURE — 99232 SBSQ HOSP IP/OBS MODERATE 35: CPT | Performed by: INTERNAL MEDICINE

## 2018-11-05 RX ADMIN — DONEPEZIL HYDROCHLORIDE 10 MG: 10 TABLET, FILM COATED ORAL at 22:45

## 2018-11-05 RX ADMIN — METOPROLOL TARTRATE 25 MG: 25 TABLET ORAL at 20:39

## 2018-11-05 RX ADMIN — ESCITALOPRAM OXALATE 10 MG: 10 TABLET ORAL at 08:30

## 2018-11-05 RX ADMIN — METOPROLOL TARTRATE 25 MG: 25 TABLET ORAL at 08:30

## 2018-11-05 RX ADMIN — POTASSIUM CHLORIDE 20 MEQ: 20 SOLUTION ORAL at 17:53

## 2018-11-05 RX ADMIN — POTASSIUM CHLORIDE 20 MEQ: 20 SOLUTION ORAL at 08:30

## 2018-11-05 RX ADMIN — ASPIRIN 81 MG: 81 TABLET, CHEWABLE ORAL at 08:29

## 2018-11-05 RX ADMIN — ACETAMINOPHEN 975 MG: 325 TABLET, FILM COATED ORAL at 06:31

## 2018-11-05 RX ADMIN — DIVALPROEX SODIUM 250 MG: 250 TABLET, DELAYED RELEASE ORAL at 22:45

## 2018-11-05 RX ADMIN — OXYCODONE HYDROCHLORIDE 5 MG: 5 TABLET ORAL at 20:42

## 2018-11-05 RX ADMIN — ACETAMINOPHEN 975 MG: 325 TABLET, FILM COATED ORAL at 14:09

## 2018-11-05 RX ADMIN — LEVOTHYROXINE SODIUM 75 MCG: 75 TABLET ORAL at 06:31

## 2018-11-05 RX ADMIN — ACETAMINOPHEN 975 MG: 325 TABLET, FILM COATED ORAL at 22:25

## 2018-11-05 RX ADMIN — ENOXAPARIN SODIUM 30 MG: 40 INJECTION SUBCUTANEOUS at 08:30

## 2018-11-05 NOTE — OCCUPATIONAL THERAPY NOTE
Occupational Therapy Treatment Note:       11/05/18 0900   Restrictions/Precautions   Weight Bearing Precautions Per Order Yes   RUE Weight Bearing Per Order WBAT   LUE Weight Bearing Per Order WBAT   RLE Weight Bearing Per Order WBAT   LLE Weight Bearing Per Order WBAT   Other Precautions Pain; Fall Risk;Cognitive; Chair Alarm; Bed Alarm;Multiple lines   Pain Assessment   Pain Assessment FLACC   Pain Score 4   Pain Type Acute pain   Pain Location Hip;Leg   Pain Orientation Right   Pain Rating: FLACC (Rest) - Face 0   Pain Rating: FLACC (Rest) - Legs 0   Pain Rating: FLACC (Rest) - Activity 0   Pain Rating: FLACC (Rest) - Cry 0   Pain Rating: FLACC (Rest) - Consolability 0   Score: FLACC (Rest) 0   Pain Rating: FLACC (Activity) - Face 1   Pain Rating: FLACC (Activity) - Legs 1   Pain Rating: FLACC (Activity) - Activity 0   Pain Rating: FLACC (Activity) - Cry 1   Pain Rating: FLACC (Activity) - Consolability 1   Score: FLACC (Activity) 4   ADL   Where Assessed Supine, bed   Grooming Assistance 4  Minimal Assistance   Grooming Deficit Setup   UB Bathing Assistance 3  Moderate Assistance   UB Bathing Deficit Setup   LB Bathing Assistance 2  Maximal Assistance   LB Bathing Deficit Setup;Steadying;Verbal cueing;Supervision/safety; Increased time to complete;Perineal area; Buttocks;Right lower leg including foot; Left lower leg including foot   UB Dressing Assistance 4  Minimal Assistance   UB Dressing Deficit Setup   LB Dressing Assistance 2  Maximal Assistance   LB Dressing Deficit Setup;Steadying; Requires assistive device for steadying;Verbal cueing;Supervision/safety; Increased time to complete; Don/doff L sock; Don/doff R sock; Thread RLE into underwear; Thread LLE into underwear;Pull up over hips   LB Dressing Comments Pt with limited functional reach to BLE  Increased anxiety noted this tx session  Functional Standing Tolerance   Time 3 mins   Activity dynamic stand balance activity      Comments Pt with signs of pain with increased anxiety with activity B/P 198/70   Bed Mobility   Sit to Supine 3  Moderate assistance   Additional items Assist x 1;Bedrails; Increased time required;LE management;Verbal cues   Additional Comments Pt with increased pain with activity  Extended time required to stabilize    Transfers   Sit to Stand 4  Minimal assistance   Additional items Assist x 2;Armrests; Bedrails; Increased time required;Verbal cues   Stand to Sit 4  Minimal assistance   Additional items Assist x 2;Bedrails;Armrests; Increased time required;Verbal cues   Stand pivot 4  Minimal assistance   Additional items Assist x 2;Armrests; Bedrails; Increased time required;Verbal cues   Additional Comments cue for safe hand palcement and to advance steps and RW required  Functional Mobility   Functional Mobility 4  Minimal assistance   Additional Comments x2   Additional items Rolling walker   Cognition   Overall Cognitive Status Impaired   Arousal/Participation Alert; Responsive; Cooperative;Lethargic   Attention Difficulty attending to directions   Orientation Level Oriented to person;Disoriented to place; Disoriented to time;Oriented to situation   Memory Decreased short term memory;Decreased recall of recent events;Decreased recall of precautions   Following Commands Follows one step commands without difficulty   Comments Pt with limited focus to tasks  Increased distractibility through out tx session  Activity Tolerance   Activity Tolerance Patient limited by fatigue;Patient limited by pain   Medical Staff Made Aware reported noted vomiting and increased B/P of 198/70 to [employer]  Assessment   Assessment Pt was seen for skilled OT with focus on completion of self care tasks, functional mobility, review of fall prevention and review of current plan of care  Pt with off topic communication noted through out tx session  Pt pleasant with self care tasks while long sitting in bed  Increased A required to bring self to EOB   Extended time provided to achieve stabilization while seated EOB  See above levels of A required for all functional tasks  Pt does become anxious with functional tasks instance  Noted vomiting following functional mobility  B/P 198/70 following self care routine and functional mobility  Pt may benefit from further rehab with focus on achieving optimal performance levels with all functional tasks  Continue to recommend Inpatient rehab   Plan   Treatment Interventions ADL retraining;Functional transfer training; Endurance training;Cognitive reorientation   Goal Expiration Date 11/11/18   Treatment Day 2   OT Frequency 3-5x/wk   Recommendation   OT Discharge Recommendation Short Term Rehab   Barthel Index   Feeding 5   Bathing 0   Grooming Score 0   Dressing Score 5   Bladder Score 10   Bowels Score 10   Toilet Use Score 5   Transfers (Bed/Chair) Score 5   Mobility (Level Surface) Score 0   Stairs Score 0   Barthel Index Score 40   Modified [place] Scale   Modified Climax Scale 4   Mahin Darnell [place]

## 2018-11-05 NOTE — SPEECH THERAPY NOTE
Speech Language/Pathology    Speech/Language Pathology Progress Note    Patient Name: King BENNETT Date: 11/5/2018     Problem List  Patient Active Problem List   Diagnosis    Word finding difficulty    Confusion    Benign essential hypertension    Memory loss    Hyperlipidemia    Hypothyroidism    Generalized anxiety disorder    Dementia without behavioral disturbance    Anxiety    Ambulatory dysfunction    Coronary artery disease    Vitamin D deficiency    Xerostomia    Closed 2-part intertrochanteric fracture of proximal end of right femur (HCC)    Lytic bone lesion of right femur    Hx of non-ST elevation myocardial infarction (NSTEMI)    History of subdural hemorrhage    Hematoma of parietal scalp    Acute blood loss anemia    Postprocedural hypotension    Thrombocytopenia (HCC)    Hypokalemia    Other dysphagia        Past Medical History  Past Medical History:   Diagnosis Date    Abnormal CT scan, neck     Last Assessed:  5/28/14    Altered mental status 11/22/2017    Anxiety     Basal cell adenocarcinoma     Benign essential hypertension     Last Assessed:  1/14/13    Closed 2-part intertrochanteric fracture of proximal end of right femur ([facility] [address] ) 10/30/2018    Coronary artery disease     Dementia 2006    Depression     Diverticulosis     Essential hypertension 11/22/2017    Hyperlipidemia     Hypertension     Hypothyroid 11/22/2017    Hypothyroid     Insomnia     Long term (current) use of antithrombotics/antiplatelets     Last Assessed:  9/23/13    Lytic bone lesion of right femur 10/30/2018    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 ([address] )     MI (myocardial infarction) ([address] )     Last Assessed:  6/11/15    Subdural hematoma, post-traumatic ([address] ) 2014    left sided, treated nonoperatively    Subdural hygroma 2014    s/p subdural hematoma    TIA (transient ischemic attack)     Unstable angina ([address] ) 2015    Vertigo         Past Surgical History  Past Surgical History:   Procedure Laterality Date    BREAST LUMPECTOMY Right     CATARACT EXTRACTION      CHOLECYSTECTOMY      COLONOSCOPY  2003    HYSTERECTOMY      Total abdominal with removal of both ovaries    PA OPEN RX FEMUR FX+INTRAMED ANTOINETTE Right 10/31/2018    Procedure: INSERTION NAIL IM FEMUR ANTEGRADE (TROCHANTERIC); Surgeon: Stacia Vo DO;  Location: [facility];  Service: Orthopedics         Subjective:  Pt alert, confused, abl eto make basic needs  k nown but has diffiuclty trying to say what she wants to say sometimes  Objective:  Pt/family requesting diet upgrade  As I was ordering w/ pt, she happened to mention that "they cut my lettuce up really small" and that she needs broth to dunk her sandwich in  Pt requested a grilled cheese w/ broth, hot tea, and fresh fruit  She dunked her sandwich in broth, and either coughed or throat cleared w/ each swallow, stating she needed some water to get it down  Swallows were audible  I removed the fruit that was harder, however she was still throat clearing excessively  Ended meal    Assessment:  I suspect pt have a long standing dysphagia that may be getting progressively worse  Throat clear and cough w/ solid trials today  "i need the water to get it down"  Plan/Recommendations:  Continue puree w/ thin for now  Cush meds  VBS tomorrow  Time tbd

## 2018-11-05 NOTE — PHYSICAL THERAPY NOTE
Physical Therapy Treatment Note   11/05/18 0903   Pain Assessment   Pain Assessment FLACC   Pain Type Acute pain   Pain Location Hip;Leg   Hospital Pain Intervention(s) Ambulation/increased activity; Emotional support;Rest;Repositioned   Pain Rating: FLACC (Rest) - Face 0   Pain Rating: FLACC (Rest) - Legs 0   Pain Rating: FLACC (Rest) - Activity 0   Pain Rating: FLACC (Rest) - Cry 0   Pain Rating: FLACC (Rest) - Consolability 0   Score: FLACC (Rest) 0   Pain Rating: FLACC (Activity) - Face 1   Pain Rating: FLACC (Activity) - Legs 1   Pain Rating: FLACC (Activity) - Activity 1   Pain Rating: FLACC (Activity) - Cry 1   Pain Rating: FLACC (Activity) - Consolability 1   Score: FLACC (Activity) 5   Restrictions/Precautions   RLE Weight Bearing Per Order WBAT   Other Precautions Chair Alarm;Cognitive;Aspiration;Multiple lines; Fall Risk;Pain   General   Chart Reviewed Yes   Family/Caregiver Present No   Cognition   Overall Cognitive Status Impaired   Arousal/Participation Alert; Cooperative   Attention Difficulty attending to directions   Orientation Level Oriented to person;Oriented to place   Memory Decreased recall of precautions;Decreased recall of recent events;Decreased short term memory   Following Commands Follows one step commands without difficulty   Subjective   Subjective Pt reports pain R hip, Pt unable to rate  Pt agreeable to PT  Transfers   Sit to Stand 4  Minimal assistance   Additional items Assist x 2;Armrests; Increased time required;Verbal cues   Stand to Sit 4  Minimal assistance   Additional items Assist x 2;Armrests; Increased time required;Verbal cues   Additional Comments cues for handplacement and technique   Ambulation/Elevation   Gait pattern Poor UE support;Decreased R stance; Foward flexed; Short stride; Step to;Excessively slow   Gait Assistance 4  Minimal assist   Additional items Assist x 2;Verbal cues   Assistive Device Rolling walker   Distance 10'x1   Balance   Static Sitting Fair + Dynamic Sitting Poor +   Static Standing Poor   Dynamic Standing Poor   Ambulatory Poor   Endurance Deficit   Endurance Deficit Yes   Endurance Deficit Description pain fatigue   Activity Tolerance   Activity Tolerance Patient limited by pain; Patient limited by fatigue   Medical Staff Made Aware Paul Valadez   Nurse Made Aware RN, Rissa Delarosa   Exercises   Heelslides Sitting;AROM; Right  (x 12 reps   )   Glute Sets Sitting;AROM; Bilateral  (x 12 reps   )   Hip Flexion Sitting;AROM;AAROM; Right  (x 12 reps   )   Hip Adduction Sitting;AROM; Bilateral  (x 12 reps  isometric hip add   )   Knee AROM [employer]; Right  (x 12 reps   )   Ankle Pumps Sitting;AROM; Bilateral  (x 12 reps   )   Assessment   Prognosis Fair   Problem List Decreased strength;Decreased endurance;Decreased range of motion; Impaired balance;Decreased mobility; Decreased cognition;Decreased safety awareness;Pain;Orthopedic restrictions   Assessment Pt agreeable to PT  Pt performed seated b/l le arom exercises x 12 rps with increased time, moderate verbal and tactile cues for exercise techniques and performance  Pt requiring assistance to perform hip flexion exercises  PT demonstrates impaired ability to perform sit to stand transfers requiring min assist x2 with moderate cues for handplacement and transfer techniques  Pt demonstrates retropulsion upon standing requiring mod assist x1 to correct  Pt progressed with ambulation distances to10'x1 with min assist x2 with verbal cues for le sequencing, improved posture, ue weightbearing,improved safety and improved gait pattern  Mobility limited by anxiety, fearful of falling, pain and fatigue  PT will continue to benefit from skilled inpt PT and rehab at d/c  Pt remained out of bed in chair at conclusion of PT session scd's applied to b/l le's  Call bell in reach chair alarm activated      Goals   Patient Goals " to be able to walk "     [facility] Expiration Date 11/12/18   Treatment Day 2   Plan Treatment/Interventions Functional transfer training;LE strengthening/ROM; Elevations; Therapeutic exercise; Endurance training;Patient/family training;Equipment eval/education; Bed mobility;Gait training;Spoke to nursing;OT   Progress Slow progress, decreased activity tolerance   PT Frequency (5-7x week)   Recommendation   Recommendation Short-term skilled PT   PT - OK to Discharge Yes  (to rehab)       Hailey Thompson, PTA

## 2018-11-05 NOTE — UTILIZATION REVIEW
Continued Stay Review    Date/POD#:       FOR  11/3    POD  #    3    Vital Signs: /66 (BP Location: Right arm)   Pulse 56   Temp 98 1 °F (36 7 °C) (Temporal)   Resp 18   Ht 5' 3" (1 6 m) Comment: per previous records  Wt 50 2 kg (110 lb 10 7 oz)   SpO2 92%   BMI 19 60 kg/m²     Medication:   Scheduled Meds:   Current Facility-Administered Medications:  acetaminophen 975 mg Oral Q8H [facility] Geovanna Osborn MD   ALPRAZolam 0 25 mg Oral BID PRN Vermell Dancer, PA-C   aluminum-magnesium hydroxide-simethicone 30 mL Oral Q6H PRN Belinda Dalton PA-C   aspirin 81 mg Oral Daily Dennys Rojas MD   calcium carbonate 1,000 mg Oral Daily PRN Belinda Dalton PA-C   divalproex sodium 250 mg Oral HS Geovanna Osborn MD   donepezil 10 mg Oral HS Geovanna Osborn MD   enoxaparin 30 mg Subcutaneous Daily Geovanna Osborn MD   escitalopram 10 mg Oral Daily Dennys Rojas MD   levothyroxine 75 mcg Oral Early Morning Dennys Rojas MD   metoprolol tartrate 25 mg Oral Q12H [facility] Geovanna Osborn MD   oxyCODONE 2 5 mg Oral Q4H PRN Dennys Rojas MD   oxyCODONE 5 mg Oral Q4H PRN Dennys Rojas MD   potassium chloride 20 mEq Oral BID Dennys Rojas MD   simethicone 80 mg Oral 4x Daily PRN Belinda Dalton PA-C     Continuous Infusions:    PRN Meds: ALPRAZolam    aluminum-magnesium hydroxide-simethicone    calcium carbonate    oxyCODONE    oxyCODONE    simethicone    Abnormal Labs/Diagnostic Results:   H/H    8 5/25 8    Age/Sex: 80 y o  female     Assessment/Plan: 80 y o  female s/p closed reduction internal fixation for right hip fracture  POD 3, ABLA     Plan:  Pain control prn  PT/OT-WBAT right LE   Lovenox/TEDs/SCDs for DVT prophylaxis-will need lovenox x 4 weeks from date of surgery  Maintain dressing x 7 days from date of surgery, then dressing changes daily    Discharge Plan:    SNF

## 2018-11-05 NOTE — PROGRESS NOTES
[facility] Internal Medicine Progress Note  Patient: Good Thomas 80 y o  female   MRN: [id number]  PCP: Ely Alvarez DO  Unit/Bed#: E2 -01 Encounter: [id number]  Date Of Visit: 11/05/18    Assessment:    Principal Problem:    Closed 2-part intertrochanteric fracture of proximal end of right femur ([address] )  Active Problems:    Benign essential hypertension    Hypothyroidism    Generalized anxiety disorder    Dementia without behavioral disturbance    Ambulatory dysfunction    Coronary artery disease    Vitamin D deficiency    Lytic bone lesion of right femur    Hx of non-ST elevation myocardial infarction (NSTEMI)    History of subdural hemorrhage    Hematoma of parietal scalp    Acute blood loss anemia    Postprocedural hypotension    Thrombocytopenia (HCC)    Hypokalemia    Other dysphagia      Plan: This is a 66-year-old female who resides at [facility] admitted on 10/30/2018 after suffering a mechanical fall  Patient uses a walker at home    She sustained a closed intertrochanteric fracture status post closed reduction and internal fixation on 10/31/2018      1 ) Mechanical fall with closed intertrochanteric fracture of right femur  -status post close reduction and internal fixation of right hip on 10/31/2018  -PT OT recommending rehab  -DVT prophylaxis  -orthopedic follow-up appreciated     2 ) Acute blood loss anemia  -likely secondary to recent surgery, status post 2 unit PRBC  -on 11/01/2018 will be today is 8 2, no signs of any active bleeding, will monitor CBC     3 ) Hypertension  -continue with metoprolol, monitor blood pressure     4 ) CAD  -continue with aspirin, metoprolol  -echocardiogram in 2017 revealed ejection fraction 55-65%, mild aortic regurgitation, mild tricuspid regurgitation, mild pulmonic valve regurgitation  -no active chest pain     5 ) Hematoma of parietal scalp  -CT head revealed right posterior parietal/occipital scalp hematoma     6 ) Dysphagia  -likely transient postoperative  -speech therapist recommending video barium swallow     7 ) Anxiety  -continue with Lexapro, Depakote, Xanax     8 ) Hypothyroidism  -continue levothyroxine     9 ) History of dementia  -mental status at baseline     10 ) Thrombocytopenia  -platelets gradually improving  -will monitor CBC       VTE Pharmacologic Prophylaxis:   Pharmacologic: lovenox    Patient Centered Rounds: I have performed bedside rounds with nursing staff today  Education and Discussions with Family / Patient: LM with daughter Santi Perez    Time Spent for Care: 20 minutes  More than 50% of total time spent on counseling and coordination of care as described above  Current Length of Stay: 6 day(s)    Current Patient Status: Inpatient   Certification Statement: The patient will continue to require additional inpatient hospital stay due to VBS    Discharge Plan / Estimated Discharge Date: 2-3 days    Code Status: Level 3 - DNAR and DNI      Subjective:   Patient seen and examined at bedside, currently denies any complaints  Objective:     Vitals:   Temp (24hrs), Av 2 °F (36 8 °C), Min:97 9 °F (36 6 °C), Max:98 7 °F (37 1 °C)    Temp:  [97 9 °F (36 6 °C)-98 7 °F (37 1 °C)] 98 1 °F (36 7 °C)  HR:  [55-68] 56  Resp:  [18] 18  BP: (144-187)/(65-90) 152/66  SpO2:  [92 %-97 %] 92 %  Body mass index is 19 6 kg/m²  Input and Output Summary (last 24 hours): Intake/Output Summary (Last 24 hours) at 18 1431  Last data filed at 18 1133   Gross per 24 hour   Intake                0 ml   Output             1902 ml   Net            -1902 ml       Physical Exam:    Constitutional: Patient is in no acute distress  HEENT:  Normocephalic, atraumatic, EOMI, PERRLA, no scleral icterus, no pallor, moist oral mucosa  Neck:  Supple, no masses, no thyromegaly, no bruits Normal range of motion  Lymph nodes:  No lymphadenopathy  Cardiovascular: Normal S1S2, RRR, No murmurs/rubs/gallops appreciated    Pulmonary: Clear to auscultation bilaterally, No rhonchi/rales/wheezing appreciated  Abdominal: Soft, Bowel sounds present, Non-tender, Non-distended, No rebound/guarding, no hepatomegaly   Musculoskeletal:  Right lower extremity dressing in place  Extremities:  No cyanosis, clubbing or edema  Peripheral pulses palpable and equal bilaterally  Neurological: Cranial nerves II-XII grossly intact, sensation intact, otherwise no focal neurological symptoms  Skin: Skin is warm and dry, no rashes  Additional Data:     Labs:      Results from last 7 days  Lab Units 11/05/18  0641   WBC Thousand/uL 9 11   HEMOGLOBIN g/dL 9 4*   HEMATOCRIT % 29 6*   PLATELETS Thousands/uL 187   NEUTROS PCT % 67   LYMPHS PCT % 16   MONOS PCT % 10   EOS PCT % 6       Results from last 7 days  Lab Units 11/05/18  0641  10/30/18  1855   POTASSIUM mmol/L 3 9  < > 3 9   CHLORIDE mmol/L 104  < > 104   CO2 mmol/L 32  < > 30   BUN mg/dL 15  < > 22   CREATININE mg/dL 0 54*  < > 0 74   CALCIUM mg/dL 8 8  < > 9 3   ALK PHOS U/L  --   --  57   ALT U/L  --   --  18   AST U/L  --   --  19   < > = values in this interval not displayed  I Have Reviewed All Lab Data Listed Above          Recent Cultures (last 7 days):           Last 24 Hours Medication List:     Current Facility-Administered Medications:  acetaminophen 975 mg Oral Q8H [address] Geovanna Osborn MD   ALPRAZolam 0 25 mg Oral BID PRN Ekta Coburn PA-C   aluminum-magnesium hydroxide-simethicone 30 mL Oral Q6H PRN Belinda Dalton PA-C   aspirin 81 mg Oral Daily Lien Rivera MD   calcium carbonate 1,000 mg Oral Daily PRN Belinda Dalton PA-C   divalproex sodium 250 mg Oral HS Lien Rivera MD   donepezil 10 mg Oral HS Lien Rviera MD   enoxaparin 30 mg Subcutaneous Daily Lien Rivera MD   escitalopram 10 mg Oral Daily Lien Rivera MD   levothyroxine 75 mcg Oral Early Morning Lien Rivera MD   metoprolol tartrate 25 mg Oral Q12H [address] Geovanna Osborn MD   oxyCODONE 2 5 mg Oral Q4H PRN Geovanna MD Irena   oxyCODONE 5 mg Oral Q4H PRN Emeterio Medellin MD   potassium chloride 20 mEq Oral BID Emeterio Medellin MD   simethicone 80 mg Oral 4x Daily PRN Belinda Dalton PA-C        Today, Patient Was Seen By: Kennedi Lowry MD

## 2018-11-05 NOTE — PLAN OF CARE
Problem: PHYSICAL THERAPY ADULT  Goal: Performs mobility at highest level of function for planned discharge setting  See evaluation for individualized goals  Treatment/Interventions: Functional transfer training, LE strengthening/ROM, Elevations, Therapeutic exercise, Endurance training, Patient/family training, Equipment eval/education, Bed mobility, Gait training, Compensatory technique education, Continued evaluation, Spoke to nursing, OT, Spoke to MD  Equipment Recommended: Lubna Padilla       See flowsheet documentation for full assessment, interventions and recommendations  Outcome: Progressing  Prognosis: Fair  Problem List: Decreased strength, Decreased endurance, Decreased range of motion, Impaired balance, Decreased mobility, Decreased cognition, Decreased safety awareness, Pain, Orthopedic restrictions  Assessment: Pt agreeable to PT  Pt performed seated b/l le arom exercises x 12 rps with increased time, moderate verbal and tactile cues for exercise techniques and performance  Pt requiring assistance to perform hip flexion exercises  PT demonstrates impaired ability to perform sit to stand transfers requiring min assist x2 with moderate cues for handplacement and transfer techniques  Pt demonstrates retropulsion upon standing requiring mod assist x1 to correct  Pt progressed with ambulation distances to10'x1 with min assist x2 with verbal cues for le sequencing, improved posture, ue weightbearing,improved safety and improved gait pattern  Mobility limited by anxiety, fearful of falling, pain and fatigue  PT will continue to benefit from skilled inpt PT and rehab at d/c  Pt remained out of bed in chair at conclusion of PT session scd's applied to b/l le's  Call bell in reach chair alarm activated  Barriers to Discharge: Other (Comment) (cognition)     Recommendation: Short-term skilled PT     PT - OK to Discharge: Yes (to rehab)    See flowsheet documentation for full assessment

## 2018-11-05 NOTE — PLAN OF CARE
Problem: OCCUPATIONAL THERAPY ADULT  Goal: Performs self-care activities at highest level of function for planned discharge setting  See evaluation for individualized goals  Treatment Interventions: ADL retraining, Functional transfer training, UE strengthening/ROM, Cognitive reorientation, Endurance training, Patient/family training, Equipment evaluation/education, Compensatory technique education, Energy conservation, Activityengagement          See flowsheet documentation for full assessment, interventions and recommendations  Outcome: Progressing  Limitation: Decreased ADL status, Decreased UE ROM, Decreased UE strength, Decreased Safe judgement during ADL, Decreased cognition, Decreased endurance, Decreased self-care trans, Decreased high-level ADLs  Prognosis: Good  Assessment: Pt was seen for skilled OT with focus on completion of self care tasks, functional mobility, review of fall prevention and review of current plan of care  Pt with off topic communication noted through out tx session  Pt pleasant with self care tasks while long sitting in bed  Increased A required to bring self to EOB  Extended time provided to achieve stabilization while seated EOB  See above levels of A required for all functional tasks  Pt does become anxious with functional tasks instance  Noted vomiting following functional mobility  B/P 198/70 following self care routine and functional mobility  Pt may benefit from further rehab with focus on achieving optimal performance levels with all functional tasks   Continue to recommend Inpatient rehab     OT Discharge Recommendation: Short Term Rehab  OT - OK to Discharge:  (STR when medically stable)      Comments: Joseluis Corea

## 2018-11-05 NOTE — WOUND OSTOMY CARE
Progress Note - Wound   Kendra Hampton 80 y o  female MRN: [id number]  Unit/Bed#: E2 -01 Encounter: [id number]      Assessment:   Patient seen for skin assessment  80year old female presented to the hospital from [facility] status post fall  Currently denies pain  Able to turn for assessment with minimal assist x 1  Incontinent of bowel and bladder  Appetite fair--nutrition following  Findings:  Bruising to right shoulder, hip and bilateral arms  Skin is dry, warm fragile with loss of subcutaneous tissue  Skin folds intact  Heels intact with Allevyn foam dressings for prevention  Bilateral buttocks and sacrum are blanchable and intact  Dressings intact to right hip per orthopedics team--patient is status post insertion nail IM right femur 10/31/2018  1   Skin tear to right elbow  2   Present on admission stage 1 pressure injury to buttocks--RESOLVED  Plan:   1-Hydraguard lotion to bilateral sacrum and buttocks once per shift and PRN with incontinence care  2-Elevate heels to offload pressure  3-Soft care cushion when out of bed  4-Moisturize skin daily with skin nourishing cream   5-Turn/reposition q2h or when medically stable for pressure re-distribution on skin  6-Allevyn Life foam dressings to heels for prevention  7-Cleanse right elbow skin tear with soap and water, pat dry  Apply Allevyn Life foam dressing  Change every 3 days and PRN with soilage  Wound care team will sign-off on this patient      Abner Lacey BSN, RN, CWON    Wound 11/04/18 Skin tear Elbow Right (Active)   Wound Description Clean;Pink;Dry 11/5/2018  3:00 PM   Greta-wound Assessment Purple 11/5/2018  3:00 PM   Shape Cresent 11/5/2018  3:00 PM   Wound Length (cm) 2 3 cm 11/5/2018  3:00 PM   Wound Width (cm) 1 cm 11/5/2018  3:00 PM   Wound Depth (cm) 0 1 11/5/2018  3:00 PM   Calculated Wound Volume (cm^3) 0 23 cm^3 11/5/2018  3:00 PM   Tunneling 0 cm 11/5/2018  3:00 PM   Undermining 0 11/5/2018  3:00 PM   Drainage Amount None 11/5/2018  3:00 PM   Non-staged Wound Description Not applicable 13/1/9562  4:76 PM   Treatments Cleansed;Site care 11/5/2018  3:00 PM   Dressing Other (Comment) 11/5/2018  3:00 PM   Dressing Changed New 11/5/2018  3:00 PM   Patient Tolerance Tolerated well 11/5/2018  3:00 PM   Dressing Status Clean;Dry; Intact 11/5/2018  3:00 PM         Una GRIMESN, RN, [employer]

## 2018-11-06 ENCOUNTER — APPOINTMENT (INPATIENT)
Dept: RADIOLOGY | Facility: HOSPITAL | Age: 83
DRG: 481 | End: 2018-11-06
Payer: MEDICARE

## 2018-11-06 ENCOUNTER — TRANSITIONAL CARE MANAGEMENT (OUTPATIENT)
Dept: FAMILY MEDICINE CLINIC | Facility: CLINIC | Age: 83
End: 2018-11-06

## 2018-11-06 VITALS
RESPIRATION RATE: 16 BRPM | TEMPERATURE: 98.8 F | HEART RATE: 54 BPM | SYSTOLIC BLOOD PRESSURE: 136 MMHG | DIASTOLIC BLOOD PRESSURE: 74 MMHG | HEIGHT: 63 IN | BODY MASS INDEX: 19.61 KG/M2 | OXYGEN SATURATION: 96 % | WEIGHT: 110.67 LBS

## 2018-11-06 PROBLEM — D62 ACUTE BLOOD LOSS ANEMIA: Status: RESOLVED | Noted: 2018-11-01 | Resolved: 2018-11-06

## 2018-11-06 PROBLEM — D69.6 THROMBOCYTOPENIA (HCC): Status: RESOLVED | Noted: 2018-11-02 | Resolved: 2018-11-06

## 2018-11-06 PROBLEM — E87.6 HYPOKALEMIA: Status: RESOLVED | Noted: 2018-11-02 | Resolved: 2018-11-06

## 2018-11-06 PROBLEM — I95.81 POSTPROCEDURAL HYPOTENSION: Status: RESOLVED | Noted: 2018-11-01 | Resolved: 2018-11-06

## 2018-11-06 PROBLEM — R13.19 OTHER DYSPHAGIA: Status: RESOLVED | Noted: 2018-11-02 | Resolved: 2018-11-06

## 2018-11-06 LAB
ANION GAP SERPL CALCULATED.3IONS-SCNC: 3 MMOL/L (ref 4–13)
BASOPHILS # BLD AUTO: 0.03 THOUSANDS/ΜL (ref 0–0.1)
BASOPHILS NFR BLD AUTO: 0 % (ref 0–1)
BUN SERPL-MCNC: 13 MG/DL (ref 5–25)
CALCIUM SERPL-MCNC: 8.8 MG/DL (ref 8.3–10.1)
CHLORIDE SERPL-SCNC: 104 MMOL/L (ref 100–108)
CO2 SERPL-SCNC: 34 MMOL/L (ref 21–32)
CREAT SERPL-MCNC: 0.63 MG/DL (ref 0.6–1.3)
EOSINOPHIL # BLD AUTO: 0.72 THOUSAND/ΜL (ref 0–0.61)
EOSINOPHIL NFR BLD AUTO: 7 % (ref 0–6)
ERYTHROCYTE [DISTWIDTH] IN BLOOD BY AUTOMATED COUNT: 15.3 % (ref 11.6–15.1)
GFR SERPL CREATININE-BSD FRML MDRD: 76 ML/MIN/1.73SQ M
GLUCOSE SERPL-MCNC: 83 MG/DL (ref 65–140)
HCT VFR BLD AUTO: 28.4 % (ref 34.8–46.1)
HGB BLD-MCNC: 9.1 G/DL (ref 11.5–15.4)
IMM GRANULOCYTES # BLD AUTO: 0.07 THOUSAND/UL (ref 0–0.2)
IMM GRANULOCYTES NFR BLD AUTO: 1 % (ref 0–2)
LYMPHOCYTES # BLD AUTO: 1.84 THOUSANDS/ΜL (ref 0.6–4.47)
LYMPHOCYTES NFR BLD AUTO: 18 % (ref 14–44)
MCH RBC QN AUTO: 31.7 PG (ref 26.8–34.3)
MCHC RBC AUTO-ENTMCNC: 32 G/DL (ref 31.4–37.4)
MCV RBC AUTO: 99 FL (ref 82–98)
MONOCYTES # BLD AUTO: 0.91 THOUSAND/ΜL (ref 0.17–1.22)
MONOCYTES NFR BLD AUTO: 9 % (ref 4–12)
NEUTROPHILS # BLD AUTO: 6.66 THOUSANDS/ΜL (ref 1.85–7.62)
NEUTS SEG NFR BLD AUTO: 65 % (ref 43–75)
NRBC BLD AUTO-RTO: 0 /100 WBCS
PLATELET # BLD AUTO: 238 THOUSANDS/UL (ref 149–390)
PMV BLD AUTO: 9.3 FL (ref 8.9–12.7)
POTASSIUM SERPL-SCNC: 5.1 MMOL/L (ref 3.5–5.3)
RBC # BLD AUTO: 2.87 MILLION/UL (ref 3.81–5.12)
SODIUM SERPL-SCNC: 141 MMOL/L (ref 136–145)
WBC # BLD AUTO: 10.23 THOUSAND/UL (ref 4.31–10.16)

## 2018-11-06 PROCEDURE — 99239 HOSP IP/OBS DSCHRG MGMT >30: CPT | Performed by: INTERNAL MEDICINE

## 2018-11-06 PROCEDURE — 85025 COMPLETE CBC W/AUTO DIFF WBC: CPT | Performed by: INTERNAL MEDICINE

## 2018-11-06 PROCEDURE — 74230 X-RAY XM SWLNG FUNCJ C+: CPT

## 2018-11-06 PROCEDURE — 92611 MOTION FLUOROSCOPY/SWALLOW: CPT

## 2018-11-06 PROCEDURE — 80048 BASIC METABOLIC PNL TOTAL CA: CPT | Performed by: INTERNAL MEDICINE

## 2018-11-06 PROCEDURE — 99024 POSTOP FOLLOW-UP VISIT: CPT | Performed by: PHYSICIAN ASSISTANT

## 2018-11-06 RX ORDER — OXYCODONE HYDROCHLORIDE 5 MG/1
2.5 TABLET ORAL EVERY 4 HOURS PRN
Qty: 12 TABLET | Refills: 0 | Status: SHIPPED | OUTPATIENT
Start: 2018-11-06 | End: 2019-01-11 | Stop reason: SDUPTHER

## 2018-11-06 RX ADMIN — ENOXAPARIN SODIUM 30 MG: 40 INJECTION SUBCUTANEOUS at 08:24

## 2018-11-06 RX ADMIN — LEVOTHYROXINE SODIUM 75 MCG: 75 TABLET ORAL at 06:37

## 2018-11-06 RX ADMIN — ACETAMINOPHEN 975 MG: 325 TABLET, FILM COATED ORAL at 06:37

## 2018-11-06 RX ADMIN — ACETAMINOPHEN 975 MG: 325 TABLET, FILM COATED ORAL at 14:46

## 2018-11-06 RX ADMIN — ASPIRIN 81 MG: 81 TABLET, CHEWABLE ORAL at 08:24

## 2018-11-06 RX ADMIN — METOPROLOL TARTRATE 25 MG: 25 TABLET ORAL at 08:24

## 2018-11-06 RX ADMIN — ESCITALOPRAM OXALATE 10 MG: 10 TABLET ORAL at 08:24

## 2018-11-06 NOTE — PROGRESS NOTES
[facility] Progress Note  Patient: Rajwinder Martinez 80 y o  female   MRN: [id number]  PCP: Francine Lovelace DO  Unit/Bed#: E2 -01 Encounter: [id number]  Date Of Visit: 11/06/18    Assessment:    Principal Problem:    Closed 2-part intertrochanteric fracture of proximal end of right femur ([address] )  Active Problems:    Benign essential hypertension    Hypothyroidism    Generalized anxiety disorder    Dementia without behavioral disturbance    Ambulatory dysfunction    Coronary artery disease    Vitamin D deficiency    Lytic bone lesion of right femur    Hx of non-ST elevation myocardial infarction (NSTEMI)    History of subdural hemorrhage    Hematoma of parietal scalp    Acute blood loss anemia    Postprocedural hypotension    Thrombocytopenia (HCC)    Hypokalemia    Other dysphagia      Plan: This is a 27-year-old female who resides at [facility] admitted on 10/30/2018 after suffering a mechanical fall   Patient uses a walker at home  Cynthia Marie sustained a closed intertrochanteric fracture status post closed reduction and internal fixation on 10/31/2018      1  ) Mechanical fall with closed intertrochanteric fracture of right femur  -status post close reduction and internal fixation of right hip on 10/31/2018  -PT OT recommending rehab  -DVT prophylaxis  -orthopedic follow-up appreciated     2 ) Acute blood loss anemia  -likely secondary to recent surgery, status post 2 unit PRBC  -on 11/01/2018 will be today is 8 2, no signs of any active bleeding, will monitor CBC     3  ) Hypertension  -continue with metoprolol, monitor blood pressure     4 ) CAD  -continue with aspirin, metoprolol  -echocardiogram in 2017 revealed ejection fraction 55-65%, mild aortic regurgitation, mild tricuspid regurgitation, mild pulmonic valve regurgitation  -no active chest pain     5 ) Hematoma of parietal scalp  -CT head revealed right posterior parietal/occipital scalp hematoma     6  ) FWLKYIDUP  -likely transient postoperative  -status post VPS, speech recommending dysphagia 2 diet mechanical soft     7  ) Anxiety  -continue with Lexapro, Depakote, Xanax     8  ) Hypothyroidism  -continue levothyroxine     9  ) History of dementia  -mental status at baseline     10  ) Thrombocytopenia  -platelets gradually improving  -will monitor CBC    VTE Pharmacologic Prophylaxis:   Pharmacologic: lovenox    Patient Centered Rounds: I have performed bedside rounds with nursing staff today  Time Spent for Care: 20 minutes  More than 50% of total time spent on counseling and coordination of care as described above  Current Length of Stay: 7 day(s)    Current Patient Status: Inpatient   Certification Statement: The patient will continue to require additional inpatient hospital stay due to awaiting placement    Discharge Plan / Estimated Discharge Date: 24-48 hours    Code Status: Level 3 - DNAR and DNI      Subjective:   Patient seen and examined at bedside, currently denies any chest pain, palpitations, nausea, vomiting  Objective:     Vitals:   Temp (24hrs), Av 7 °F (37 1 °C), Min:98 6 °F (37 °C), Max:98 8 °F (37 1 °C)    Temp:  [98 6 °F (37 °C)-98 8 °F (37 1 °C)] 98 8 °F (37 1 °C)  HR:  [54-62] 54  Resp:  [16] 16  BP: (110-141)/(60-74) 136/74  SpO2:  [94 %-96 %] 96 %  Body mass index is 19 6 kg/m²  Input and Output Summary (last 24 hours): Intake/Output Summary (Last 24 hours) at 18 1311  Last data filed at 18 1101   Gross per 24 hour   Intake             1300 ml   Output              645 ml   Net              655 ml       Physical Exam:    Constitutional: Patient is oriented to person, place and time, no acute distress  HEENT:  Normocephalic, atraumatic, EOMI, PERRLA, no scleral icterus, no pallor, moist oral mucosa  Neck:  Supple, no masses, no thyromegaly, no bruits Normal range of motion     Lymph nodes:  No lymphadenopathy  Cardiovascular: Normal S1S2, RRR, No murmurs/rubs/gallops appreciated  Pulmonary: Clear to auscultation bilaterally, No rhonchi/rales/wheezing appreciated  Abdominal: Soft, Bowel sounds present, Non-tender, Non-distended, No rebound/guarding, no hepatomegaly   Musculoskeletal:  Right lower extremity dressing in place  Extremities:  No cyanosis, clubbing or edema  Peripheral pulses palpable and equal bilaterally  Neurological: Cranial nerves II-XII grossly intact, sensation intact, otherwise no focal neurological symptoms  Skin: Skin is warm and dry, no rashes  Additional Data:     Labs:      Results from last 7 days  Lab Units 11/06/18  0528   WBC Thousand/uL 10 23*   HEMOGLOBIN g/dL 9 1*   HEMATOCRIT % 28 4*   PLATELETS Thousands/uL 238   NEUTROS PCT % 65   LYMPHS PCT % 18   MONOS PCT % 9   EOS PCT % 7*       Results from last 7 days  Lab Units 11/06/18  0528  10/30/18  1855   POTASSIUM mmol/L 5 1  < > 3 9   CHLORIDE mmol/L 104  < > 104   CO2 mmol/L 34*  < > 30   BUN mg/dL 13  < > 22   CREATININE mg/dL 0 63  < > 0 74   CALCIUM mg/dL 8 8  < > 9 3   ALK PHOS U/L  --   --  57   ALT U/L  --   --  18   AST U/L  --   --  19   < > = values in this interval not displayed  I Have Reviewed All Lab Data Listed Above          Recent Cultures (last 7 days):           Last 24 Hours Medication List:     Current Facility-Administered Medications:  acetaminophen 975 mg Oral Q8H [facility] Geovanna Osborn MD   ALPRAZolam 0 25 mg Oral BID PRN Greg Cha PA-C   aluminum-magnesium hydroxide-simethicone 30 mL Oral Q6H PRN Belinda Dalton PA-C   aspirin 81 mg Oral Daily Alix Jauregui MD   calcium carbonate 1,000 mg Oral Daily PRN Belinda Dalton PA-C   divalproex sodium 250 mg Oral HS Alix Jauregui MD   donepezil 10 mg Oral HS Alix Jauregui MD   enoxaparin 30 mg Subcutaneous Daily Alix Jauregui MD   escitalopram 10 mg Oral Daily lAix Jauregui MD   levothyroxine 75 mcg Oral Early Morning Alix Jauregui MD   metoprolol tartrate 25 mg Oral Q12H Johnathan Seo MD   oxyCODONE 2 5 mg Oral Q4H PRN Shelby Paredes MD   oxyCODONE 5 mg Oral Q4H PRN Shelby Paredes, MD   potassium chloride 20 mEq Oral BID Shelby Paredes, MD   simethicone 80 mg Oral 4x Daily PRN Belinda Dalton PA-C        Today, Patient Was Seen By: Eda Andrea MD

## 2018-11-06 NOTE — PROCEDURES
Video Swallow Study      Patient Name: Yasmin Schmid  LUNKJ'I Date: 11/6/2018        Past Medical History  Past Medical History:   Diagnosis Date    Abnormal CT scan, neck     Last Assessed:  5/28/14    Altered mental status 11/22/2017    Anxiety     Basal cell adenocarcinoma     Benign essential hypertension     Last Assessed:  1/14/13    Closed 2-part intertrochanteric fracture of proximal end of right femur ([address] ) 10/30/2018    Coronary artery disease     Dementia 2006    Depression     Diverticulosis     Essential hypertension 11/22/2017    Hyperlipidemia     Hypertension     Hypothyroid 11/22/2017    Hypothyroid     Insomnia     Long term (current) use of antithrombotics/antiplatelets     Last Assessed:  9/23/13    Lytic bone lesion of right femur 10/30/2018    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 ([address] )     MI (myocardial infarction) [address] [address] [facility] )     Last Assessed:  6/11/15    Subdural hematoma, post-traumatic ([address] ) 2014    left sided, treated nonoperatively    Subdural hygroma 2014    s/p subdural hematoma    TIA (transient ischemic attack)     Unstable angina (HCC) 2015    Vertigo         Past Surgical History  Past Surgical History:   Procedure Laterality Date    BREAST LUMPECTOMY Right     CATARACT EXTRACTION      CHOLECYSTECTOMY      COLONOSCOPY  2003    HYSTERECTOMY      Total abdominal with removal of both ovaries    ME OPEN RX FEMUR FX+INTRAMED ANTOINETTE Right 10/31/2018    Procedure: INSERTION NAIL IM FEMUR ANTEGRADE (TROCHANTERIC); Surgeon: Devora Flynn DO;  Location: [facility];  Service: Orthopedics       Video Barium Swallow Study    Summary:  Pt presented w/ mild oral stage and moderate pharyngeal dysphagia  Mildy weak transfer w/ mild posterior lingual residue  The epiglottis does not invert  The pt had both vallecular and pyriform retention throughout the study, as well as a posterior pharyngeal wall coating   Inconsistent response to inconsistent aspiration on thin liquids  Silent aspiration on puree at the end of the study  Hyoid excursion was variable, weak at times  Pharyngeal constriction was at least midly weak  Postural maneuvers did not improve swallow/clearance of PO  Pt may be at risk for intermittent aspiration of all consistencies  Images are on PACS  Recommendations:  Diet: trial level 2 mechanical soft  Nothing DRY  (if not tolerating may need to resume puree)  Liquids: nectar  Meds:CRUSH  Strategies: refrain from speaking w/ food in mouth, limit distractions, swallow x 2, cue to cough periodically as precaution, avoid neck extension, "swallow hard"  Upright position  F/u ST tx: yes  Therapy Prognosis: fair  May improve as overall strength improves  Prognosis considerations: difficulty following cues, needs repetition, aspiration is silent at times  Full Supervision  Aspiration Precautions  Consider consult with: nutrition  Results reviewed with: pt, nursing, physician  Aspiration precautions posted  Patient's goal:  "i want to go back to my room"    Goals:  Pt will tolerate least restrictive diet w/out s/s aspiration or oral/pharyngeal difficulties  To reduce aspiration risk:  Pt will swallow x 2 80% x to clear retention  Pt will refrain from speaking w/ po in her mouth w/ min cues  Pt will keep head at midline while eating/drinking w/ min cues  Pt will cough periodically throughout meal w/ cues as precaution to expel possible penetration/aspiration  Pt will perform at least 5 ome's per session w/ mod resistance to improve strength and ROM  Pt will tolerate level 2 mechanical soft w/ nectar thick liquids w/out s/s aspiration  Pt is a 89yof referred for VBS following upgraded trial yesterday  Pt reported in the past that she needed her "lettuce cut small" and has to "dunk my sandwich"    Previous VBS:  None known  Current Diet:  Puree w/ thin  Premorbid diet:  ? Regular w/ modifications  Dentition:  Has most of her teeth  O2 requirement:  none  Vocal Quality/Speech:  Goes off on a tangent about unrelated topics, needs redirection  Cognitive status:  Pleasantly confused but is able to make needs known    Consistencies administered: Barium laden applesauce, soft solid soaked in barium, hard solid, nectar thick, thin liquids, 1/2 13mm barium pill in puree but she chewed it  Liquids were administered by tsp x 1 and straw  Pt was seated laterally at 90 degrees  Needed frequent repositioning    Oral stage:  MILD  Lip closure:wfl  Mastication:wfl, mildly prolonged  Bolus formation: mildly reduced w/ liquids  Bolus control:mildly reduced w/ liquids  Transfer:mildly weak  Residue: posterior lingual    Pharyngeal stage:  MOD  Swallow promptness: prompt to mild delay  Did not always perform a secondary swallow to clear retention  Spill to valleculae: liquids,inconsistent  Spill to pyriforms:liquids, inconsistent  Epiglottic inversion: none/incomplete  Laryngeal rise: fair, variable  Pharyngeal constriction: mildly weak  Vallecular retention: all trials, greatest w/ puree/soft/hard solid  Pyriform retention: noted more so after the initial swallow  PPW coating: yes  Osteophytes: +  Transient penetration: liquids, inconsistent otherwise  Epiglottic undercoat:mostly w/ puree and liquids  Penetration: thin, nectar (less w/ nectar)  Aspiration: thin, puree at end of study (not when given at start of study)  Strategies: neck flexion and head rotation were not effective, secondary swallow cleared partial residue  Cued cough cleared penetration and ? Partial aspiration  ? If effortful swallow made any signif change  Response to aspiration: inconsistent    Screening of Esophageal stage:  Dysmotility and retropulsion observed at beginning of study  Retention: mild  unable to view distal esophagus

## 2018-11-06 NOTE — DISCHARGE SUMMARY
Discharge Summary - [facility]    Patient Information: Amor Guzman 80 y o  female MRN: [id number]  Unit/Bed#: E2 -01 Encounter: [id number]    Discharging Physician / Practitioner: Dominique Bryant MD  PCP: Anthony Ibarra DO  Admission Date: 10/30/2018  Discharge Date: 11/06/18    Disposition:     Other: Rehab     Reason for Admission: fall    Discharge Diagnoses:     Principal Problem:    Closed 2-part intertrochanteric fracture of proximal end of right femur ([address] )  Active Problems:    Benign essential hypertension    Hypothyroidism    Generalized anxiety disorder    Dementia without behavioral disturbance    Ambulatory dysfunction    Coronary artery disease    Vitamin D deficiency    Lytic bone lesion of right femur    Hx of non-ST elevation myocardial infarction (NSTEMI)    History of subdural hemorrhage    Hematoma of parietal scalp  Resolved Problems:    Acute blood loss anemia    Postprocedural hypotension    Thrombocytopenia (HCC)    Hypokalemia    Other dysphagia      Consultations During Hospital Stay:  · Orthopedic surgery    Procedures Performed:     · Close reduction and internal fixation of right hip on 10/31/2018    Significant Findings / Test Results:     · X-ray right hip:  Impacted intertrochanteric right hip fracture  · Chest x-ray:  Negative  · CT head:  Right posterior parietal occipital scalp hematoma, no acute intracranial abnormality  · CT cervical spine:  No traumatic malalignment  · X-ray right femur:Nonvisualization of intratrochanteric fracture on today's examination due to patient positioning  The area is obscured by soft tissue artifact  X-ray pelvis:Right hip fracture status post ORIF  Please refer to the separate procedure notes for additional details  Hospital Course:     Amor Guzman is a 80 y o  female patient who originally presented to the hospital on 10/30/2018 due to mechanical fall    Patient resides at [facility] and uses a walker to walk  Patient suffered a mechanical fall  Imaging revealed right intertrochanteric hip fracture  Orthopedic consulted and patient underwent close reduction internal fixation of right hip  Postoperatively patient had acute blood loss anemia and received 2 unit PRBC after it patient's hemoglobin has been stable  Patient was maintained on DVT prophylaxis as per orthopedic recommendations  PT recommending rehab  For patient's CAD she will be maintained on aspirin, metoprolol  Patient was noted to have dysphagia which we to be secondary to postoperative, speech evaluated patient patient underwent video barium swallow, speech recommending dysphagia to mechanical soft  Patient otherwise hemodynamically stable for discharge to rehab today  Condition at Discharge: good     Discharge Day Visit / Exam:     * Please refer to separate progress note for these details *      Discharge instructions/Information to patient and family:   See after visit summary for information provided to patient and family  Provisions for Follow-Up Care:  See after visit summary for information related to follow-up care and any pertinent home health orders  Planned Readmission: no    Discharge Statement:  I spent 30 minutes discharging the patient  This time was spent on the day of discharge  I had direct contact with the patient on the day of discharge  Greater than 50% of the total time was spent examining patient, answering all patient questions, arranging and discussing plan of care with patient as well as directly providing post-discharge instructions  Additional time then spent on discharge activities  Discharge Medications:  See after visit summary for reconciled discharge medications provided to patient and family  ** Please Note: This note has been constructed using a voice recognition system   **

## 2018-11-06 NOTE — SOCIAL WORK
Patient cleared for discharge  Patient arranged for [address] transport with Ravindra MAGALLON completed PASRR with patient's daughter Liane Starks  CM also explained IMM letter to Liane Starks and left copy at bedside  No other needs at present  Cm to follow as needed

## 2018-11-06 NOTE — PLAN OF CARE
Problem: Potential for Falls  Goal: Patient will remain free of falls  INTERVENTIONS:  - Assess patient frequently for physical needs  -  Identify cognitive and physical deficits and behaviors that affect risk of falls    -  [place] fall precautions as indicated by assessment   - Educate patient/family on patient safety including physical limitations  - Instruct patient to call for assistance with activity based on assessment  - Modify environment to reduce risk of injury  - Consider OT/PT consult to assist with strengthening/mobility   Outcome: Progressing      Problem: PAIN - ADULT  Goal: Verbalizes/displays adequate comfort level or baseline comfort level  Interventions:  - Encourage patient to monitor pain and request assistance  - Assess pain using appropriate pain scale  - Administer analgesics based on type and severity of pain and evaluate response  - Implement non-pharmacological measures as appropriate and evaluate response  - Consider cultural and social influences on pain and pain management  - Notify physician/advanced practitioner if interventions unsuccessful or patient reports new pain   Outcome: Progressing      Problem: INFECTION - ADULT  Goal: Absence or prevention of progression during hospitalization  INTERVENTIONS:  - Assess and monitor for signs and symptoms of infection  - Monitor lab/diagnostic results  - Monitor all insertion sites, i e  indwelling lines, tubes, and drains  - Monitor endotracheal (as able) and nasal secretions for changes in amount and color  - [place] appropriate cooling/warming therapies per order  - Administer medications as ordered  - Instruct and encourage patient and family to use good hand hygiene technique  - Identify and instruct in appropriate isolation precautions for identified infection/condition   Outcome: Progressing    Goal: Absence of fever/infection during neutropenic period  INTERVENTIONS:  - Monitor WBC  - Implement neutropenic guidelines   Outcome: Progressing      Problem: SAFETY ADULT  Goal: Maintain or return to baseline ADL function  INTERVENTIONS:  -  Assess patient's ability to carry out ADLs; assess patient's baseline for ADL function and identify physical deficits which impact ability to perform ADLs (bathing, care of mouth/teeth, toileting, grooming, dressing, etc )  - Assess/evaluate cause of self-care deficits   - Assess range of motion  - Assess patient's mobility; develop plan if impaired  - Assess patient's need for assistive devices and provide as appropriate  - Encourage maximum independence but intervene and supervise when necessary  ¯ Involve family in performance of ADLs  ¯ Assess for home care needs following discharge   ¯ Request OT consult to assist with ADL evaluation and planning for discharge  ¯ Provide patient education as appropriate   Outcome: Progressing    Goal: Maintain or return mobility status to optimal level  INTERVENTIONS:  - Assess patient's baseline mobility status (ambulation, transfers, stairs, etc )    - Identify cognitive and physical deficits and behaviors that affect mobility  - Identify mobility aids required to assist with transfers and/or ambulation (gait belt, sit-to-stand, lift, walker, cane, etc )  - [place] fall precautions as indicated by assessment  - Record patient progress and toleration of activity level on Mobility SBAR; progress patient to next Phase/Stage  - Instruct patient to call for assistance with activity based on assessment  - Request Rehabilitation consult to assist with strengthening/weightbearing, etc    Outcome: Progressing      Problem: DISCHARGE PLANNING  Goal: Discharge to home or other facility with appropriate resources  INTERVENTIONS:  - Identify barriers to discharge w/patient and caregiver  - Arrange for needed discharge resources and transportation as appropriate  - Identify discharge learning needs (meds, wound care, etc )  - Arrange for interpretive services to assist at discharge as needed  - Refer to Case Management Department for coordinating discharge planning if the patient needs post-hospital services based on physician/advanced practitioner order or complex needs related to functional status, cognitive ability, or social support system   Outcome: Progressing      Problem: Knowledge Deficit  Goal: Patient/family/caregiver demonstrates understanding of disease process, treatment plan, medications, and discharge instructions  Complete learning assessment and assess knowledge base    Interventions:  - Provide teaching at level of understanding  - Provide teaching via preferred learning methods   Outcome: Progressing      Problem: MUSCULOSKELETAL - ADULT  Goal: Maintain or return mobility to safest level of function  INTERVENTIONS:  - Assess patient's ability to carry out ADLs; assess patient's baseline for ADL function and identify physical deficits which impact ability to perform ADLs (bathing, care of mouth/teeth, toileting, grooming, dressing, etc )  - Assess/evaluate cause of self-care deficits   - Assess range of motion  - Assess patient's mobility; develop plan if impaired  - Assess patient's need for assistive devices and provide as appropriate  - Encourage maximum independence but intervene and supervise when necessary  - Involve family in performance of ADLs  - Assess for home care needs following discharge   - Request OT consult to assist with ADL evaluation and planning for discharge  - Provide patient education as appropriate   Outcome: Progressing    Goal: Maintain proper alignment of affected body part  INTERVENTIONS:  - Support, maintain and protect limb and body alignment  - Provide pt/fam with appropriate education   Outcome: Progressing      Problem: Prexisting or High Potential for Compromised Skin Integrity  Goal: Skin integrity is maintained or improved  INTERVENTIONS:  - Identify patients at risk for skin breakdown  - Assess and monitor skin integrity  - Assess and monitor nutrition and hydration status  - Monitor labs (i e  albumin)  - Assess for incontinence   - Turn and reposition patient  - Assist with mobility/ambulation  - Relieve pressure over bony prominences  - Avoid friction and shearing  - Provide appropriate hygiene as needed including keeping skin clean and dry  - Evaluate need for skin moisturizer/barrier cream  - Collaborate with interdisciplinary team (i e  Nutrition, Rehabilitation, etc )   - Patient/family teaching   Outcome: Progressing      Problem: Nutrition/Hydration-ADULT  Goal: Nutrient/Hydration intake appropriate for improving, restoring or maintaining nutritional needs  Monitor and assess patient's nutrition/hydration status for malnutrition (ex- brittle hair, bruises, dry skin, pale skin and conjunctiva, muscle wasting, smooth red tongue, and disorientation)  Collaborate with interdisciplinary team and initiate plan and interventions as ordered  Monitor patient's weight and dietary intake as ordered or per policy  Utilize nutrition screening tool and intervene per policy  Determine patient's food preferences and provide high-protein, high-caloric foods as appropriate       INTERVENTIONS:  - Monitor oral intake, urinary output, labs, and treatment plans  - Assess nutrition and hydration status and recommend course of action  - Evaluate amount of meals eaten  - Assist patient with eating if necessary   - Allow adequate time for meals  - Recommend/ encourage appropriate diets, oral nutritional supplements, and vitamin/mineral supplements  - Order, calculate, and assess calorie counts as needed  - Recommend, monitor, and adjust tube feedings and TPN/PPN based on assessed needs  - Assess need for intravenous fluids  - Provide specific nutrition/hydration education as appropriate  - Include patient/family/caregiver in decisions related to nutrition   Outcome: Progressing      Problem: DISCHARGE PLANNING - CARE MANAGEMENT  Goal: Discharge to post-acute care or home with appropriate resources  INTERVENTIONS:  - Conduct assessment to determine patient/family and health care team treatment goals, and need for post-acute services based on payer coverage, community resources, and patient preferences, and barriers to discharge  - Address psychosocial, clinical, and financial barriers to discharge as identified in assessment in conjunction with the patient/family and health care team  - Arrange appropriate level of post-acute services according to patients   needs and preference and payer coverage in collaboration with the physician and health care team  - Communicate with and update the patient/family, physician, and health care team regarding progress on the discharge plan  - Arrange appropriate transportation to post-acute venues   Outcome: Progressing

## 2018-11-06 NOTE — NURSING NOTE
Hand over given to the facility,explained about pills to crush and send transport team with prescrp and avs summary

## 2018-11-06 NOTE — PROGRESS NOTES
Progress Note - Orthopedics   Gonzalo Smith 80 y o  female MRN: [id number]  Unit/Bed#: [facility]      Subjective:    89 y  o female Patient seen and examined this morning  No acute events, no complaints  Pt doing well  Reports she's been working with PT and "shuffling" to the bathroom several times per day  Patient appears less confused this morning than she's been during prior visits in the hospital stay  Pain controlled   Denies fevers chills, CP, SOB    Labs:    0  Lab Value Date/Time   HCT 28 4 (L) 11/06/2018 0528   HCT 29 6 (L) 11/05/2018 0641   HCT 25 4 (L) 11/04/2018 0535   HCT 35 8 06/02/2015 0555   HCT 36 2 06/01/2015 0455   HCT 39 6 05/31/2015 0939   HGB 9 1 (L) 11/06/2018 0528   HGB 9 4 (L) 11/05/2018 0641   HGB 8 2 (L) 11/04/2018 0535   HGB 11 8 06/02/2015 0555   HGB 12 1 06/01/2015 0455   HGB 13 2 05/31/2015 0939   INR 1 06 11/21/2017 2355   INR 1 11 05/31/2015 0939   WBC 10 23 (H) 11/06/2018 0528   WBC 9 11 11/05/2018 0641   WBC 6 85 11/04/2018 0535   WBC 7 27 06/02/2015 0555   WBC 9 04 06/01/2015 0455   WBC 6 56 05/31/2015 0939   ESR 36 (H) 03/11/2014 1404   CRP 9 6 (H) 03/11/2014 1404       Meds:    Current Facility-Administered Medications:     acetaminophen (TYLENOL) tablet 975 mg, 975 mg, Oral, Q8H [facility], Cristina Andrew MD, 975 mg at 11/06/18 2133    ALPRAZolam (XANAX) tablet 0 25 mg, 0 25 mg, Oral, BID PRN, Emily Lilly PA-C, 0 25 mg at 11/01/18 1034    aluminum-magnesium hydroxide-simethicone (MYLANTA) 200-200-20 mg/5 mL oral suspension 30 mL, 30 mL, Oral, Q6H PRN, Belinda Dalton PA-C    aspirin chewable tablet 81 mg, 81 mg, Oral, Daily, Cristina Andrew MD, 81 mg at 11/05/18 0829    calcium carbonate (TUMS) chewable tablet 1,000 mg, 1,000 mg, Oral, Daily PRN, Belinda Dalton PA-C    divalproex sodium (DEPAKOTE) EC tablet 250 mg, 250 mg, Oral, HS, Geovanna Osborn MD, 250 mg at 11/05/18 224    donepezil (ARICEPT) tablet 10 mg, 10 mg, Oral, HS, Cristina Andrew MD, 10 mg at 11/05/18 0093   enoxaparin (LOVENOX) subcutaneous injection 30 mg, 30 mg, Subcutaneous, Daily, Susy Mansfield MD, 30 mg at 11/05/18 0830    escitalopram (LEXAPRO) tablet 10 mg, 10 mg, Oral, Daily, Susy Mansfield MD, 10 mg at 11/05/18 0830    levothyroxine tablet 75 mcg, 75 mcg, Oral, Early Morning, Susy Mansfield MD, 75 mcg at 11/06/18 0637    metoprolol tartrate (LOPRESSOR) tablet 25 mg, 25 mg, Oral, Q12H [address], Susy Mansfield MD, 25 mg at 11/05/18 2039    oxyCODONE (ROXICODONE) IR tablet 2 5 mg, 2 5 mg, Oral, Q4H PRN, Susy Mansfield MD, 2 5 mg at 11/04/18 2140    oxyCODONE (ROXICODONE) IR tablet 5 mg, 5 mg, Oral, Q4H PRN, Susy Mansfield MD, 5 mg at 11/05/18 2042    potassium chloride 10 % oral solution 20 mEq, 20 mEq, Oral, BID, Susy Mansfield MD, 20 mEq at 11/05/18 1753    simethicone (MYLICON) chewable tablet 80 mg, 80 mg, Oral, 4x Daily PRN, Belinda Dalton PA-C    Blood Culture:   No results found for: BLOODCX    Wound Culture:   No results found for: WOUNDCULT    Ins and Outs:  I/O last 24 hours: In: 600 [P O :600]  Out: 1593 [[id number]]          Physical:  Vitals:    11/05/18 2313   BP: 141/60   Pulse: (!) 54   Resp: 16   Temp: 98 6 °F (37 °C)   SpO2: 94%     Musculoskeletal: right Lower Extremity  · Skin: no erythema, ecchymosis   · Dressing:c/d/i  · SILT s/s/sp/dp/t  +fhl/ehl, +ankle dorsi/plantar flexion  +PT pulse    Assessment:    89 y  o female POD 6 closed reduction internal fixation for right hip fracture, ABLA  Plan:  · WBAT RLE  · PT/OT  · Dressings will be changed tomorrow  · Pain control  · DVT ppx: Lovenox x 4 weeks from the date of surgery (10/31)  · Medical mgt per SLIM   · Dispo: Ortho stable, will need ortho f/u 2 weeks from the date of surgery       Belinda Dalton PA-C

## 2018-11-08 ENCOUNTER — TRANSITIONAL CARE MANAGEMENT (OUTPATIENT)
Dept: FAMILY MEDICINE CLINIC | Facility: CLINIC | Age: 83
End: 2018-11-08

## 2018-11-09 ENCOUNTER — DOCUMENTATION (OUTPATIENT)
Dept: FAMILY MEDICINE CLINIC | Facility: CLINIC | Age: 83
End: 2018-11-09

## 2018-11-09 ENCOUNTER — TRANSITIONAL CARE MANAGEMENT (OUTPATIENT)
Dept: FAMILY MEDICINE CLINIC | Facility: CLINIC | Age: 83
End: 2018-11-09

## 2018-11-16 ENCOUNTER — APPOINTMENT (OUTPATIENT)
Dept: RADIOLOGY | Facility: CLINIC | Age: 83
End: 2018-11-16
Payer: COMMERCIAL

## 2018-11-16 ENCOUNTER — OFFICE VISIT (OUTPATIENT)
Dept: OBGYN CLINIC | Facility: MEDICAL CENTER | Age: 83
End: 2018-11-16

## 2018-11-16 VITALS
SYSTOLIC BLOOD PRESSURE: 114 MMHG | HEIGHT: 63 IN | DIASTOLIC BLOOD PRESSURE: 68 MMHG | WEIGHT: 107 LBS | HEART RATE: 68 BPM | BODY MASS INDEX: 18.96 KG/M2

## 2018-11-16 DIAGNOSIS — S72.141D CLOSED 2-PART INTERTROCHANTERIC FRACTURE OF RIGHT FEMUR WITH ROUTINE HEALING, SUBSEQUENT ENCOUNTER: ICD-10-CM

## 2018-11-16 DIAGNOSIS — M25.552 LEFT HIP PAIN: ICD-10-CM

## 2018-11-16 DIAGNOSIS — S72.141D CLOSED 2-PART INTERTROCHANTERIC FRACTURE OF RIGHT FEMUR WITH ROUTINE HEALING, SUBSEQUENT ENCOUNTER: Primary | ICD-10-CM

## 2018-11-16 PROCEDURE — 73502 X-RAY EXAM HIP UNI 2-3 VIEWS: CPT

## 2018-11-16 PROCEDURE — 99024 POSTOP FOLLOW-UP VISIT: CPT | Performed by: ORTHOPAEDIC SURGERY

## 2018-11-16 NOTE — PROGRESS NOTES
Assessment/Plan:  1  Closed 2-part intertrochanteric fracture of right femur with routine healing, subsequent encounter    2  Left hip pain      Orders Placed This Encounter   Procedures    XR hip/pelv 2-3 vws right if performed     · Staples removed toady  · Continue PT  · Pain control prn- tylenol  · Patient aware to call ahead for abx prior to dental appts  · Follow up in 4 weeks    Return in about 4 weeks (around 12/14/2018)  I answered all of the patient's questions during the visit and provided education of the patient's condition during the visit  The patient verbalized understanding of the information given and agrees with the plan  This note was dictated using Stormpulse software  It may contain errors including improperly dictated words  Please contact physician directly for any questions  Subjective   Chief Complaint:   Chief Complaint   Patient presents with    Right Hip - Post-op       Good Thomas is a 80 y o  female who presents 2 weeks post op right insertion IM nail antegrade, 10/31/18  Today she complains of mild pain  She is currently in physical hterapy including walking  She continues to use Lovenox  Per assisted living documentation she is prescibed ASA 81mg, oxycodone and tylenol  Currently the patient takes tylenol as needed  Review of Systems  ROS:    See HPI for musculoskeletal review     All other systems reviewed are negative     History:  Past Medical History:   Diagnosis Date    Abnormal CT scan, neck     Last Assessed:  5/28/14    Altered mental status 11/22/2017    Anxiety     Basal cell adenocarcinoma     Benign essential hypertension     Last Assessed:  1/14/13    Closed 2-part intertrochanteric fracture of proximal end of right femur ([address] ) 10/30/2018    Coronary artery disease     Dementia 2006    Depression     Diverticulosis     Essential hypertension 11/22/2017    Hyperlipidemia     Hypertension     Hypothyroid 11/22/2017    Hypothyroid     Insomnia     Long term (current) use of antithrombotics/antiplatelets     Last Assessed:  9/23/13    Lytic bone lesion of right femur 10/30/2018    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 [address]     [facility]     Last Assessed:  6/11/15    Subdural hematoma, post-traumatic ([facility] ) 2014    left sided, treated nonoperatively    Subdural hygroma 2014    s/p subdural hematoma    TIA (transient ischemic attack)     Unstable angina [facility] ) 2015    Vertigo      Past Surgical History:   Procedure Laterality Date    BREAST LUMPECTOMY Right     CATARACT EXTRACTION      CHOLECYSTECTOMY      COLONOSCOPY  2003    HYSTERECTOMY      Total abdominal with removal of both ovaries    [place] OPEN RX FEMUR FX+INTRAMED ANTOINETTE Right 10/31/2018    Procedure: INSERTION NAIL IM FEMUR ANTEGRADE (TROCHANTERIC);   Surgeon: Geno Marie DO;  Location: [facility];  Service: Orthopedics     Social History   History   Alcohol Use No     Comment: Per [employer]:  Social drinker     History   Drug Use No     History   Smoking Status    Never Smoker   Smokeless Tobacco    Never Used     Family History:   Family History   Problem Relation Age of Onset    Diabetes Mother     Lung cancer Mother     Colon cancer Father     Dementia Father     Prostate cancer Brother        Meds/Allergies     (Not in a hospital admission)  Allergies   Allergen Reactions    Ace Inhibitors      Pt unsure of reaction    Celecoxib GI Intolerance    Haldol [Haloperidol]      Pt unsure of reaction    Sulfa Antibiotics      Pt unsure of reaction          Objective     /68   Pulse 68   Ht 5' 3" (1 6 m)   Wt 48 5 kg (107 lb)   BMI 18 95 kg/m²      PE:  AAOx 3  WDWN  Hearing intact, no drainage from eyes  no audible wheezing  no abdominal distension  LE compartments soft, AT/GS intact    Ortho Exam:  right hip:   INC: healed, No erythema, mild swelling  Mild pain with Flexion and extension  No pain with IR and ER     Imaging: I personally review imaging: Adequate prosthesis alignment  No acute changes        Scribe Attestation    I,:   Kitty Santoro am acting as a scribe while in the presence of the attending physician :        I,:   Radha Bennett, DO personally performed the services described in this documentation    as scribed in my presence :

## 2018-11-27 ENCOUNTER — TELEPHONE (OUTPATIENT)
Dept: FAMILY MEDICINE CLINIC | Facility: CLINIC | Age: 83
End: 2018-11-27

## 2018-11-27 NOTE — TELEPHONE ENCOUNTER
Scheduled for TCM visit 12/6/18, will be discharging on 11/28/18 from [facility]   Please call patient to do TCM note after discharge, thanks

## 2018-11-28 ENCOUNTER — TRANSITIONAL CARE MANAGEMENT (OUTPATIENT)
Dept: FAMILY MEDICINE CLINIC | Facility: CLINIC | Age: 83
End: 2018-11-28

## 2018-11-29 ENCOUNTER — HOSPITAL ENCOUNTER (EMERGENCY)
Facility: HOSPITAL | Age: 83
Discharge: HOME | End: 2018-11-29
Attending: EMERGENCY MEDICINE | Admitting: EMERGENCY MEDICINE
Payer: MEDICARE

## 2018-11-29 ENCOUNTER — APPOINTMENT (EMERGENCY)
Dept: CT IMAGING | Facility: HOSPITAL | Age: 83
End: 2018-11-29
Payer: MEDICARE

## 2018-11-29 ENCOUNTER — TRANSITIONAL CARE MANAGEMENT (OUTPATIENT)
Dept: FAMILY MEDICINE CLINIC | Facility: CLINIC | Age: 83
End: 2018-11-29

## 2018-11-29 VITALS
BODY MASS INDEX: 18.71 KG/M2 | OXYGEN SATURATION: 95 % | SYSTOLIC BLOOD PRESSURE: 99 MMHG | WEIGHT: 105.6 LBS | RESPIRATION RATE: 16 BRPM | TEMPERATURE: 98.4 F | HEART RATE: 63 BPM | DIASTOLIC BLOOD PRESSURE: 42 MMHG

## 2018-11-29 DIAGNOSIS — K59.00 CONSTIPATION: Primary | ICD-10-CM

## 2018-11-29 LAB
ALBUMIN SERPL BCP-MCNC: 2.8 G/DL (ref 3.5–5)
ALP SERPL-CCNC: 100 U/L (ref 46–116)
ALT SERPL W P-5'-P-CCNC: 15 U/L (ref 12–78)
ANION GAP SERPL CALCULATED.3IONS-SCNC: 8 MMOL/L (ref 4–13)
APTT PPP: 37 SECONDS (ref 26–38)
AST SERPL W P-5'-P-CCNC: 18 U/L (ref 5–45)
BASOPHILS # BLD AUTO: 0.02 THOUSANDS/ΜL (ref 0–0.1)
BASOPHILS NFR BLD AUTO: 0 % (ref 0–1)
BILIRUB SERPL-MCNC: 0.51 MG/DL (ref 0.2–1)
BUN SERPL-MCNC: 26 MG/DL (ref 5–25)
CALCIUM SERPL-MCNC: 9.2 MG/DL (ref 8.3–10.1)
CHLORIDE SERPL-SCNC: 103 MMOL/L (ref 100–108)
CO2 SERPL-SCNC: 28 MMOL/L (ref 21–32)
CREAT SERPL-MCNC: 0.87 MG/DL (ref 0.6–1.3)
EOSINOPHIL # BLD AUTO: 0.03 THOUSAND/ΜL (ref 0–0.61)
EOSINOPHIL NFR BLD AUTO: 0 % (ref 0–6)
ERYTHROCYTE [DISTWIDTH] IN BLOOD BY AUTOMATED COUNT: 16.7 % (ref 11.6–15.1)
GFR SERPL CREATININE-BSD FRML MDRD: 57 ML/MIN/1.73SQ M
GLUCOSE SERPL-MCNC: 134 MG/DL (ref 65–140)
HCT VFR BLD AUTO: 32.2 % (ref 34.8–46.1)
HGB BLD-MCNC: 10.2 G/DL (ref 11.5–15.4)
IMM GRANULOCYTES # BLD AUTO: 0.04 THOUSAND/UL (ref 0–0.2)
IMM GRANULOCYTES NFR BLD AUTO: 0 % (ref 0–2)
INR PPP: 1.14 (ref 0.86–1.17)
LYMPHOCYTES # BLD AUTO: 0.8 THOUSANDS/ΜL (ref 0.6–4.47)
LYMPHOCYTES NFR BLD AUTO: 8 % (ref 14–44)
MCH RBC QN AUTO: 33.7 PG (ref 26.8–34.3)
MCHC RBC AUTO-ENTMCNC: 31.7 G/DL (ref 31.4–37.4)
MCV RBC AUTO: 106 FL (ref 82–98)
MONOCYTES # BLD AUTO: 0.77 THOUSAND/ΜL (ref 0.17–1.22)
MONOCYTES NFR BLD AUTO: 8 % (ref 4–12)
NEUTROPHILS # BLD AUTO: 8.43 THOUSANDS/ΜL (ref 1.85–7.62)
NEUTS SEG NFR BLD AUTO: 84 % (ref 43–75)
NRBC BLD AUTO-RTO: 0 /100 WBCS
PLATELET # BLD AUTO: 206 THOUSANDS/UL (ref 149–390)
PMV BLD AUTO: 9.3 FL (ref 8.9–12.7)
POTASSIUM SERPL-SCNC: 3.8 MMOL/L (ref 3.5–5.3)
PROT SERPL-MCNC: 6.6 G/DL (ref 6.4–8.2)
PROTHROMBIN TIME: 14.7 SECONDS (ref 11.8–14.2)
RBC # BLD AUTO: 3.03 MILLION/UL (ref 3.81–5.12)
SODIUM SERPL-SCNC: 139 MMOL/L (ref 136–145)
WBC # BLD AUTO: 10.09 THOUSAND/UL (ref 4.31–10.16)

## 2018-11-29 PROCEDURE — 85025 COMPLETE CBC W/AUTO DIFF WBC: CPT | Performed by: PHYSICIAN ASSISTANT

## 2018-11-29 PROCEDURE — 85730 THROMBOPLASTIN TIME PARTIAL: CPT | Performed by: PHYSICIAN ASSISTANT

## 2018-11-29 PROCEDURE — 96372 THER/PROPH/DIAG INJ SC/IM: CPT

## 2018-11-29 PROCEDURE — 36415 COLL VENOUS BLD VENIPUNCTURE: CPT | Performed by: PHYSICIAN ASSISTANT

## 2018-11-29 PROCEDURE — 80053 COMPREHEN METABOLIC PANEL: CPT | Performed by: PHYSICIAN ASSISTANT

## 2018-11-29 PROCEDURE — 74177 CT ABD & PELVIS W/CONTRAST: CPT

## 2018-11-29 PROCEDURE — 85610 PROTHROMBIN TIME: CPT | Performed by: PHYSICIAN ASSISTANT

## 2018-11-29 PROCEDURE — 99284 EMERGENCY DEPT VISIT MOD MDM: CPT

## 2018-11-29 RX ORDER — LIDOCAINE HYDROCHLORIDE 20 MG/ML
JELLY TOPICAL ONCE
Status: COMPLETED | OUTPATIENT
Start: 2018-11-29 | End: 2018-11-29

## 2018-11-29 RX ORDER — DOCUSATE SODIUM 100 MG/1
100 CAPSULE, LIQUID FILLED ORAL 2 TIMES DAILY
Qty: 60 CAPSULE | Refills: 0 | Status: SHIPPED | OUTPATIENT
Start: 2018-11-29 | End: 2019-01-28 | Stop reason: SDUPTHER

## 2018-11-29 RX ORDER — MELATONIN
1000 DAILY
COMMUNITY
End: 2018-12-05 | Stop reason: SDUPTHER

## 2018-11-29 RX ORDER — POLYETHYLENE GLYCOL 3350 17 G/17G
17 POWDER, FOR SOLUTION ORAL DAILY
Qty: 250 G | Refills: 0 | Status: SHIPPED | OUTPATIENT
Start: 2018-11-29 | End: 2018-12-20 | Stop reason: SDUPTHER

## 2018-11-29 RX ADMIN — IODIXANOL 85 ML: 320 INJECTION, SOLUTION INTRAVASCULAR at 14:22

## 2018-11-29 RX ADMIN — METHYLNALTREXONE BROMIDE 4 MG: 12 INJECTION, SOLUTION SUBCUTANEOUS at 17:04

## 2018-11-29 RX ADMIN — IOHEXOL 50 ML: 240 INJECTION, SOLUTION INTRATHECAL; INTRAVASCULAR; INTRAVENOUS; ORAL at 12:30

## 2018-11-29 RX ADMIN — LIDOCAINE HYDROCHLORIDE: 20 JELLY TOPICAL at 15:56

## 2018-11-29 NOTE — ED NOTES
Patient had large bowel movement in bedside commode  Patient reports feeling better  [facility]ks[facility] PAC aware        Mai Boyce RN  11/29/18 7488

## 2018-11-29 NOTE — ED NOTES
Patient noted to have significant amount of liquid stool in brief  [facility]ks[facility] PAC aware  RN to wait on disimpaction until [facility] reevaluates patient        Mai Boyce RN  11/29/18 2258

## 2018-11-29 NOTE — ED NOTES
Significant amount of stool removed with disimpaction  Patient currently sitting on commode attempting to have bowel movement        Rahul Stein RN  11/29/18 [id number]

## 2018-11-29 NOTE — DISCHARGE INSTRUCTIONS
Constipation   WHAT YOU NEED TO KNOW:   Constipation is when you have hard, dry bowel movements, or you go longer than usual between bowel movements  DISCHARGE INSTRUCTIONS:   Return to the emergency department if:   · You have blood in your bowel movements  · You have a fever and abdominal pain with the constipation  Contact your healthcare provider if:   · Your constipation gets worse  · You start to vomit  · You have questions or concerns about your condition or care  Medicines:   · Medicine or a fiber supplement  may help make your bowel movement softer  A laxative may help relax and loosen your intestines to help you have a bowel movement  You may also be given medicine to increase fluid in your intestines  The fluid may help move bowel movements through your intestines  · Take your medicine as directed  Contact your healthcare provider if you think your medicine is not helping or if you have side effects  Tell him of her if you are allergic to any medicine  Keep a list of the medicines, vitamins, and herbs you take  Include the amounts, and when and why you take them  Bring the list or the pill bottles to follow-up visits  Carry your medicine list with you in case of an emergency  Manage your constipation:   · Drink liquids as directed  You may need to drink extra liquids to help soften and move your bowels  Ask how much liquid to drink each day and which liquids are best for you  · Eat high-fiber foods  This may help decrease constipation by adding bulk to your bowel movements  High-fiber foods include fruit, vegetables, whole-grain breads and cereals, and beans  Your healthcare provider or dietitian can help you create a high-fiber meal plan  · Exercise regularly  Regular physical activity can help stimulate your intestines  Ask which exercises are best for you  · Schedule a time each day to have a bowel movement    This may help train your body to have regular bowel movements  Bend forward while you are on the toilet to help move the bowel movement out  Sit on the toilet for at least 10 minutes, even if you do not have a bowel movement  Follow up with your healthcare provider as directed:  Write down your questions so you remember to ask them during your visits  © 2017 [address] Information is for End User's use only and may not be sold, redistributed or otherwise used for commercial purposes  All illustrations and images included in CareNotes® are the copyrighted property of [employer]  or Levy Gonzalez  The above information is an  only  It is not intended as medical advice for individual conditions or treatments  Talk to your doctor, nurse or pharmacist before following any medical regimen to see if it is safe and effective for you

## 2018-11-30 ENCOUNTER — TRANSITIONAL CARE MANAGEMENT (OUTPATIENT)
Dept: FAMILY MEDICINE CLINIC | Facility: CLINIC | Age: 83
End: 2018-11-30

## 2018-11-30 NOTE — ED PROVIDER NOTES
History  Chief Complaint   Patient presents with    Constipation     Patient arrives via EMS from [facility]  Per EMS, ambulance was called do to two days of no bowel movement  Hx of constipation  patient denies abdominal pain but reports discomfort when attempting to have a BM  72-year-old female with past medical history of dementia, hyperlipidemia, hypertension, hypothyroid, breast cancer in remission, hysterectomy, status post insertion of the nail for femur fracture approximately 1 month ago, who presents to the emergency department for constipation for the past 2-3 days  Per the patient's son, patient has been on narcotic medications, but stopped using them approximately 3 days ago after she started to become constipated  Since then she has not had bowel movement  Patient does complain of left lower quadrant moderate abdominal pain that is waxing and waning and rectal pain  She denies any blood  Denies urinary pain/frequency/urgency, nausea, vomiting  Denies fevers or chills states that this has happened previously  Attempted relief with miralax without relief  History provided by:  Patient, medical records and relative  History limited by:  Dementia   used: No    Constipation   Associated symptoms: abdominal pain    Associated symptoms: no back pain, no diarrhea, no dysuria, no fever, no nausea and no vomiting        Prior to Admission Medications   Prescriptions Last Dose Informant Patient Reported? Taking?    ALPRAZolam (XANAX) 0 25 mg tablet   Yes No   Sig: Take 1 tablet by mouth   Menthol, Topical Analgesic, (BENGAY EX)   Yes Yes   Sig: Apply topically   acetaminophen (TYLENOL) 500 mg tablet   Yes Yes   Sig: Take 500 mg by mouth every 6 (six) hours as needed for mild pain   aspirin 81 mg chewable tablet   No Yes   Sig: CHEW 1 TABLET AND SWALLOW ORALLY DAILY ON MONDAY, WEDNESDAY & FRIDAY (HEART)   cholecalciferol (VITAMIN D3) 1,000 units tablet Yes Yes   Sig: Take 1,000 Units by mouth daily   divalproex sodium (DEPAKOTE) 250 mg EC tablet   No Yes   Sig: TAKE 1 TABLET ORALLY AT BEDTIME (DO NOT CRUSH) (ALTERED MENTAL STATUS/ANXIETY DISORDER)   donepezil (ARICEPT) 10 mg tablet   No Yes   Sig: TAKE 1 TABLET ORALLY DAILY (MEMORY LOSS)   enoxaparin (LOVENOX) 30 mg/0 3 mL   No Yes   Sig: Inject 0 3 mL (30 mg total) under the skin daily for 30 days   escitalopram (LEXAPRO) 10 mg tablet   No Yes   Sig: TAKE 1 TABLET ORALLY DAILY (ANXIETY DISORDER)   levothyroxine 75 mcg tablet   No Yes   Sig: TAKE 1 TABLET ORALLY DAILY (THYROID)   metoprolol tartrate (LOPRESSOR) 25 mg tablet   No Yes   Sig: TAKE 1 TABLET ORALLY TWICE DAILY ( BLOOD PRESSURE )   oxyCODONE (ROXICODONE) 5 mg immediate release tablet   No No   Sig: Take 0 5 tablets (2 5 mg total) by mouth every 4 (four) hours as needed for moderate pain Max Daily Amount: 15 mg   polyethylene glycol (GLYCOLAX) powder   Yes No   Sig: Take 17 g by mouth daily      Facility-Administered Medications: None       Past Medical History:   Diagnosis Date    Abnormal CT scan, neck     Last Assessed:  5/28/14    Altered mental status 11/22/2017    Anxiety     Basal cell adenocarcinoma     Benign essential hypertension     Last Assessed:  1/14/13    Closed 2-part intertrochanteric fracture of proximal end of right femur (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 [address] )     MI (myocardial infarction) ([facility] [address] )     Last Assessed:  6/11/15    Subdural hematoma, post-traumatic ([facility] [address] ) 2014    left sided, treated nonoperatively    Subdural hygroma 2014 s/p subdural hematoma    TIA (transient ischemic attack)     Unstable angina ([facility] [address] ) 2015    Vertigo        Past Surgical History:   Procedure Laterality Date    BREAST LUMPECTOMY Right     CATARACT EXTRACTION      CHOLECYSTECTOMY      COLONOSCOPY  2003    HYSTERECTOMY      Total abdominal with removal of both ovaries    PA OPEN RX FEMUR FX+INTRAMED ANTOINETTE Right 10/31/2018    Procedure: INSERTION NAIL IM FEMUR ANTEGRADE (TROCHANTERIC); Surgeon: Anisa Benitez DO;  Location: [facility];  Service: Orthopedics       Family History   Problem Relation Age of Onset    Diabetes Mother     Lung cancer Mother     Colon cancer Father     Dementia Father     Prostate cancer Brother      I have reviewed and agree with the history as documented  Social History   Substance Use Topics    Smoking status: Never Smoker    Smokeless tobacco: Never Used    Alcohol use No      Comment: Per [employer]:  Social drinker        Review of Systems   Constitutional: Negative for chills and fever  Eyes: Negative  Respiratory: Negative for cough, chest tightness, shortness of breath and wheezing  Cardiovascular: Negative for chest pain, palpitations and leg swelling  Gastrointestinal: Positive for abdominal pain, constipation and rectal pain  Negative for abdominal distention, anal bleeding, blood in stool, diarrhea, nausea and vomiting  Genitourinary: Negative for dysuria, flank pain, frequency, hematuria and urgency  Musculoskeletal: Negative for arthralgias, back pain, gait problem, joint swelling, myalgias, neck pain and neck stiffness  Skin: Negative for color change, pallor, rash and wound  Neurological: Negative for dizziness, syncope, weakness, light-headedness, numbness and headaches  Psychiatric/Behavioral: Negative  Physical Exam  Physical Exam   Constitutional: She is oriented to person, place, and time  She appears well-developed  No distress     cachectic    HENT:   Head: Normocephalic and atraumatic  Eyes: Pupils are equal, round, and reactive to light  Conjunctivae and EOM are normal    Neck: Normal range of motion  Neck supple  Cardiovascular: Normal rate, regular rhythm and intact distal pulses  Pulmonary/Chest: Effort normal and breath sounds normal  She has no wheezes  She has no rales  Abdominal: Soft  Bowel sounds are normal  She exhibits mass (Palpable firmness in the LLQ)  She exhibits no distension  There is tenderness (moderate with palpation to the LLQ)  There is no rebound and no guarding  No hernia  Genitourinary: Rectal exam shows guaiac negative stool  Genitourinary Comments: Brown stool, moderately soft, in the rectal vault  Musculoskeletal: Normal range of motion  She exhibits no edema or tenderness  Neurological: She is alert and oriented to person, place, and time  No cranial nerve deficit or sensory deficit  She exhibits normal muscle tone  Coordination normal    Skin: Skin is warm and dry  Capillary refill takes less than 2 seconds  She is not diaphoretic  Psychiatric: She has a normal mood and affect  Her behavior is normal    Nursing note and vitals reviewed        Vital Signs  ED Triage Vitals [11/29/18 1130]   Temperature Pulse Respirations Blood Pressure SpO2   98 4 °F (36 9 °C) 61 18 131/58 99 %      Temp Source Heart Rate Source Patient Position - Orthostatic VS BP Location FiO2 (%)   Oral Monitor Sitting Right arm --      Pain Score       No Pain           Vitals:    11/29/18 1130 11/29/18 1314 11/29/18 1446 11/29/18 1640   BP: 131/58 124/82 (!) 85/43 (!) 99/42   Pulse: 61 68 60 63   Patient Position - Orthostatic VS: Sitting Lying Lying Sitting       Visual Acuity      ED Medications  Medications   iodixanol (VISIPAQUE) 320 MG/ML injection 85 mL (85 mL Intravenous Given 11/29/18 1422)   iohexol (OMNIPAQUE) 240 MG/ML solution 50 mL (50 mL Oral Given 11/29/18 1230)   lidocaine (URO-JET) 2 % topical gel ( Topical Given 11/29/18 7316) methylnaltrexone (RELISTOR) subcutaneous injection 4 mg (4 mg Subcutaneous Given 11/29/18 1701)       Diagnostic Studies  Results Reviewed     Procedure Component Value Units Date/Time    Comprehensive metabolic panel [[id number]]  (Abnormal) Collected:  11/29/18 1224    Lab Status:  Final result Specimen:  Blood from Arm, Right Updated:  11/29/18 1253     Sodium 139 mmol/L      Potassium 3 8 mmol/L      Chloride 103 mmol/L      CO2 28 mmol/L      ANION GAP 8 mmol/L      BUN 26 (H) mg/dL      Creatinine 0 87 mg/dL      Glucose 134 mg/dL      Calcium 9 2 mg/dL      AST 18 U/L      ALT 15 U/L      Alkaline Phosphatase 100 U/L      Total Protein 6 6 g/dL      Albumin 2 8 (L) g/dL      Total Bilirubin 0 51 mg/dL      eGFR 57 ml/min/1 73sq m     Narrative:         National Kidney Disease Education Program recommendations are as follows:  GFR calculation is accurate only with a steady state creatinine  Chronic Kidney disease less than 60 ml/min/1 73 sq  meters  Kidney failure less than 15 ml/min/1 73 sq  meters      Protime-INR [[id number]]  (Abnormal) Collected:  11/29/18 1224    Lab Status:  Final result Specimen:  Blood from Arm, Right Updated:  11/29/18 1241     Protime 14 7 (H) seconds      INR 1 14    APTT [[id number]]  (Normal) Collected:  11/29/18 1224    Lab Status:  Final result Specimen:  Blood from Arm, Right Updated:  11/29/18 1241     PTT 37 seconds     CBC and differential [[id number]]  (Abnormal) Collected:  11/29/18 1224    Lab Status:  Final result Specimen:  Blood from Arm, Right Updated:  11/29/18 1235     WBC 10 09 Thousand/uL      RBC 3 03 (L) Million/uL      Hemoglobin 10 2 (L) g/dL      Hematocrit 32 2 (L) %       (H) fL      MCH 33 7 pg      MCHC 31 7 g/dL      RDW 16 7 (H) %      MPV 9 3 fL      Platelets 812 Thousands/uL      nRBC 0 /100 WBCs      Neutrophils Relative 84 (H) %      Immat GRANS % 0 %      Lymphocytes Relative 8 (L) %      Monocytes Relative 8 %      Eosinophils Relative 0 % Basophils Relative 0 %      Neutrophils Absolute 8 43 (H) Thousands/µL      Immature Grans Absolute 0 04 Thousand/uL      Lymphocytes Absolute 0 80 Thousands/µL      Monocytes Absolute 0 77 Thousand/µL      Eosinophils Absolute 0 03 Thousand/µL      Basophils Absolute 0 02 Thousands/µL                  CT abdomen pelvis with contrast   Final Result by Sunny Wall MD (11/29 9264)      Moderate fecal volume distending the rectal vault  Otherwise bowel appearing within normal limits  No acute abnormality identified in the left lower quadrant to account for patient's pain  Mild bilateral hydronephrosis, possibly related to bladder distention  Soft tissue filling defect within the stomach raising suspicion for possible leiomyoma  The study was marked in EPIC for significant notification  Workstation performed: [id number]                    Procedures  Procedures       Phone Contacts  ED Phone Contact    ED Course  ED Course as of Nov 30 0733   Thu Nov 29, 2018   1258 Guaiac negative  Brown stool  MDM  Number of Diagnoses or Management Options  Constipation:   Diagnosis management comments: Differential Diagnosis includes but is not limited to: constipation, bowel obstruction 2/2 adhesions or cancer, diverticulitis, ileus, other intraabdominal pathology  Labs are generally unremarkable  CT abdomen pelvis shows no obstruction, but does show soft tissue filling defect within the stomach raising suspicion for possible leiomyoma  Patient and son informed on possible leiomyoma  Constipation resolved with manual disimpaction performed by RN  Patient was able to have a large bowel movement on the commode at bedside prior to discharge  Abdominal pain improved following bowel movement  Dc home with PMD follow up  Instructed to follow bowel regiment  Recommended to cease taking narcotics         Amount and/or Complexity of Data Reviewed  Clinical lab tests: ordered and reviewed  Tests in the radiology section of CPT®: ordered and reviewed      CritCare Time    Disposition  Final diagnoses:   Constipation     Time reflects when diagnosis was documented in both MDM as applicable and the Disposition within this note     Time User Action Codes Description Comment    11/29/2018  4:16 PM Izaiah Cortez Add [K59 00] Constipation       ED Disposition     ED Disposition Condition Comment    Discharge  Alma Delia Haro discharge to home/self care  Condition at discharge: Good        Follow-up Information     Follow up With Specialties Details Why Contact Info    Pam Resendiz, DO Family Medicine In 1 week As needed, If symptoms worsen [address]  [phone]            Discharge Medication List as of 11/29/2018  4:17 PM      START taking these medications    Details   docusate sodium (COLACE) 100 mg capsule Take 1 capsule (100 mg total) by mouth 2 (two) times a day, Starting Thu 11/29/2018, Print      ! ! polyethylene glycol (GLYCOLAX) powder Take 17 g by mouth daily, Starting Thu 11/29/2018, Print       !! - Potential duplicate medications found  Please discuss with provider        CONTINUE these medications which have NOT CHANGED    Details   acetaminophen (TYLENOL) 500 mg tablet Take 500 mg by mouth every 6 (six) hours as needed for mild pain, Historical Med      aspirin 81 mg chewable tablet CHEW 1 TABLET AND SWALLOW ORALLY DAILY ON MONDAY, WEDNESDAY & FRIDAY (HEART), Normal      cholecalciferol (VITAMIN D3) 1,000 units tablet Take 1,000 Units by mouth daily, Historical Med      divalproex sodium (DEPAKOTE) 250 mg EC tablet TAKE 1 TABLET ORALLY AT BEDTIME (DO NOT CRUSH) (ALTERED MENTAL STATUS/ANXIETY DISORDER), Normal      donepezil (ARICEPT) 10 mg tablet TAKE 1 TABLET ORALLY DAILY (MEMORY LOSS), Normal      enoxaparin (LOVENOX) 30 mg/0 3 mL Inject 0 3 mL (30 mg total) under the skin daily for 30 days, Starting Wed 11/7/2018, Until Fri 12/7/2018, Print      escitalopram (LEXAPRO) 10 mg tablet TAKE 1 TABLET ORALLY DAILY (ANXIETY DISORDER), Normal      levothyroxine 75 mcg tablet TAKE 1 TABLET ORALLY DAILY (THYROID), Normal      Menthol, Topical Analgesic, (BENGAY EX) Apply topically, Historical Med      metoprolol tartrate (LOPRESSOR) 25 mg tablet TAKE 1 TABLET ORALLY TWICE DAILY ( BLOOD PRESSURE ), Normal      ALPRAZolam (XANAX) 0 25 mg tablet Take 1 tablet by mouth, Starting Tue 12/12/2017, Historical Med      oxyCODONE (ROXICODONE) 5 mg immediate release tablet Take 0 5 tablets (2 5 mg total) by mouth every 4 (four) hours as needed for moderate pain Max Daily Amount: 15 mg, Starting Tue 11/6/2018, Print      ! ! polyethylene glycol (GLYCOLAX) powder Take 17 g by mouth daily, Historical Med       !! - Potential duplicate medications found  Please discuss with provider  No discharge procedures on file      ED Provider  Electronically Signed by           Satya Groves PA-C  11/30/18 0492

## 2018-12-03 ENCOUNTER — PATIENT OUTREACH (OUTPATIENT)
Dept: OTHER | Facility: HOSPITAL | Age: 83
End: 2018-12-03

## 2018-12-05 ENCOUNTER — TELEPHONE (OUTPATIENT)
Dept: FAMILY MEDICINE CLINIC | Facility: CLINIC | Age: 83
End: 2018-12-05

## 2018-12-05 DIAGNOSIS — E55.9 VITAMIN D DEFICIENCY: Primary | ICD-10-CM

## 2018-12-05 RX ORDER — ACETAMINOPHEN 325 MG/1
TABLET ORAL
Qty: 180 TABLET | Refills: 4 | Status: SHIPPED | OUTPATIENT
Start: 2018-12-05 | End: 2019-01-16 | Stop reason: SDUPTHER

## 2018-12-05 RX ORDER — MULTIVIT-MIN/IRON/FOLIC ACID/K 18-600-40
CAPSULE ORAL
Qty: 30 TABLET | Refills: 4 | Status: SHIPPED | OUTPATIENT
Start: 2018-12-05 | End: 2019-02-10 | Stop reason: ALTCHOICE

## 2018-12-05 NOTE — TELEPHONE ENCOUNTER
[facility] called stating that pt has head lice  They needed a script for it to be okay to use their [facility] head lice kit   Fax was sent over for the okay by Dr Jaya Newman

## 2018-12-05 NOTE — TELEPHONE ENCOUNTER
If you could right take 1 Colace daily as needed for constipation and for boost pudding twice daily and see if Ladonna Donovan or Steve Hancock can sign it and  can fax it

## 2018-12-05 NOTE — TELEPHONE ENCOUNTER
[facility] called and asked if you could send over an order to give pt colace or miralax as needed for constipation  Also states since pt has been on a puree diet, she has not been eating enough  Asking if we can put another order in for boost protein pudding BID      [phone]

## 2018-12-06 ENCOUNTER — TELEPHONE (OUTPATIENT)
Dept: FAMILY MEDICINE CLINIC | Facility: CLINIC | Age: 83
End: 2018-12-06

## 2018-12-06 NOTE — TELEPHONE ENCOUNTER
Since pts appt for the [facility] was cancelled for today  Where can I squeeze her in with you?

## 2018-12-07 ENCOUNTER — PATIENT OUTREACH (OUTPATIENT)
Dept: OTHER | Facility: HOSPITAL | Age: 83
End: 2018-12-07

## 2018-12-10 ENCOUNTER — TELEPHONE (OUTPATIENT)
Dept: FAMILY MEDICINE CLINIC | Facility: CLINIC | Age: 83
End: 2018-12-10

## 2018-12-10 NOTE — TELEPHONE ENCOUNTER
Bria Duarte called and had to cancel her appt this Friday because her daughter made an ortho appt at the same time  Can you fit her into your schedule next week?     MY#[phone] [facility]

## 2018-12-14 ENCOUNTER — TELEPHONE (OUTPATIENT)
Dept: FAMILY MEDICINE CLINIC | Facility: CLINIC | Age: 83
End: 2018-12-14

## 2018-12-14 ENCOUNTER — OFFICE VISIT (OUTPATIENT)
Dept: OBGYN CLINIC | Facility: MEDICAL CENTER | Age: 83
End: 2018-12-14

## 2018-12-14 ENCOUNTER — TELEPHONE (OUTPATIENT)
Dept: OBGYN CLINIC | Facility: MEDICAL CENTER | Age: 83
End: 2018-12-14

## 2018-12-14 ENCOUNTER — APPOINTMENT (OUTPATIENT)
Dept: RADIOLOGY | Facility: CLINIC | Age: 83
End: 2018-12-14
Payer: MEDICARE

## 2018-12-14 VITALS
BODY MASS INDEX: 18.96 KG/M2 | WEIGHT: 107 LBS | DIASTOLIC BLOOD PRESSURE: 70 MMHG | SYSTOLIC BLOOD PRESSURE: 112 MMHG | HEIGHT: 63 IN

## 2018-12-14 DIAGNOSIS — S72.141D CLOSED 2-PART INTERTROCHANTERIC FRACTURE OF RIGHT FEMUR WITH ROUTINE HEALING, SUBSEQUENT ENCOUNTER: Primary | ICD-10-CM

## 2018-12-14 DIAGNOSIS — S72.141D CLOSED 2-PART INTERTROCHANTERIC FRACTURE OF RIGHT FEMUR WITH ROUTINE HEALING, SUBSEQUENT ENCOUNTER: ICD-10-CM

## 2018-12-14 PROCEDURE — 99024 POSTOP FOLLOW-UP VISIT: CPT | Performed by: ORTHOPAEDIC SURGERY

## 2018-12-14 PROCEDURE — 73501 X-RAY EXAM HIP UNI 1 VIEW: CPT

## 2018-12-14 NOTE — PROGRESS NOTES
Assessment/Plan     1  Closed 2-part intertrochanteric fracture of right femur with routine healing, subsequent encounter      No orders of the defined types were placed in this encounter  · WBAT RLE, continue activity as tolerated  · Continue Tylenol for pain as needed and  · Continue home exercises, or physical therapy   · Patient should present to future appointments with care giver or family member due to her confusion  Return in about 6 weeks (around 1/25/2019)  Subjective   Chief Complaint:   Chief Complaint   Patient presents with    Right Hip - Post-op, Follow-up       Kwesi Antonio is a 80 y o  female who presents 6 weeks postop right insertion of IM nail antegrade, performed 10/31/2018  Patient states she has no pain  Patient is presented alone to the appointment today and is somewhat confused due to dementia but able to provide some history  She was discharged from the skilled nursing facility back to her normal assisted living community  She has completed 4 weeks of Lovenox and now only uses ASA 81 mg daily  She only takes Tylenol as needed for pain  She is able to ambulate with her walker without difficulty for short distances and used her walker to arrive to the appointment today  Review of Systems  See HPI for musculoskeletal review     All other systems reviewed are negative     History:  Past Medical History:   Diagnosis Date    Abnormal CT scan, neck     Last Assessed:  5/28/14    Altered mental status 11/22/2017    Anxiety     Basal cell adenocarcinoma     Benign essential hypertension     Last Assessed:  1/14/13    Closed 2-part intertrochanteric fracture of proximal end of right femur ([facility] [address] ) 10/30/2018    Coronary artery disease     Dementia 2006    Depression     Diverticulosis     Essential hypertension 11/22/2017    Hyperlipidemia     Hypertension     Hypothyroid 11/22/2017    Hypothyroid     Insomnia     Long term (current) use of antithrombotics/antiplatelets     Last Assessed:  9/23/13    Lytic bone lesion of right femur 10/30/2018    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 ([facility] [address] )     [facility]     Last Assessed:  6/11/15    Subdural hematoma, post-traumatic ([facility] [address] ) 2014    left sided, treated nonoperatively    Subdural hygroma 2014    s/p subdural hematoma    TIA (transient ischemic attack)     Unstable angina ([facility] [address] ) 2015    Vertigo      Past Surgical History:   Procedure Laterality Date    BREAST LUMPECTOMY Right     CATARACT EXTRACTION      CHOLECYSTECTOMY      COLONOSCOPY  2003    HYSTERECTOMY      Total abdominal with removal of both ovaries    [place] OPEN RX FEMUR FX+INTRAMED ANTOINETTE Right 10/31/2018    Procedure: INSERTION NAIL IM FEMUR ANTEGRADE (TROCHANTERIC); Surgeon: Matheus Sotomayor DO;  Location: [facility];  Service: Orthopedics     Social History   History   Alcohol Use No     Comment: Per Allscripts:  Social drinker     History   Drug Use No     History   Smoking Status    Never Smoker   Smokeless Tobacco    Never Used     Family History:   Family History   Problem Relation Age of Onset    Diabetes Mother     Lung cancer Mother     Colon cancer Father     Dementia Father     Prostate cancer Brother        Meds/Allergies     (Not in a hospital admission)  Allergies   Allergen Reactions    Ace Inhibitors      Pt unsure of reaction    Celecoxib GI Intolerance    Haldol [Haloperidol]      Pt unsure of reaction    Sulfa Antibiotics      Pt unsure of reaction          Objective     /70   Ht 5' 3" (1 6 m)   Wt 48 5 kg (107 lb)   BMI 18 95 kg/m²      PE:  AAOx 3  WDWN  Hearing intact, no drainage from eyes  no audible wheezing  no abdominal distension  LE compartments soft, skin intact    Ortho Exam:  right hip:   No dislocation/deformity  ROM: full  No TTP over right hip   Incisions: healed  Objectively denies pain with range of motion      Imaging Studies: I have personally reviewed pertinent films in PACS  Right hip AP view only due to patient difficulty tolerating XR  Shows fractures healing appropriately with hardware in good alignment

## 2018-12-14 NOTE — TELEPHONE ENCOUNTER
Spoke to Shyann at [facility] in regards to the patient presenting to the appointment alone today  She states their policy at the facility is that if a patienthasa history of dementia or confusion, they do not go to appointments alone  However, per Shyann, the patient's daughter is the one who told them that she had the appointment to begin with and that the daughter was going to meet the patient here at the appointment but never showed  Shyann states that the daughter has been called regarding this issue

## 2018-12-14 NOTE — TELEPHONE ENCOUNTER
[facility] called stating that they need a script for pt for Biotin 2 times a day for dry mouth and a UA for anxiety   Please advise,     Phone: [phone]

## 2018-12-14 NOTE — TELEPHONE ENCOUNTER
Yonis Banda   [phone]  Dr Adrian George is requesting a call back from you  She said she would like to speak with you about this patient and her office visit from today  She would not tell me anything else about what else she needed I asked  She is asking that you please follow up when you have a chance thank you

## 2018-12-15 ENCOUNTER — TELEPHONE (OUTPATIENT)
Dept: FAMILY MEDICINE CLINIC | Facility: CLINIC | Age: 83
End: 2018-12-15

## 2018-12-17 ENCOUNTER — TELEPHONE (OUTPATIENT)
Dept: FAMILY MEDICINE CLINIC | Facility: CLINIC | Age: 83
End: 2018-12-17

## 2018-12-17 DIAGNOSIS — N39.0 URINARY TRACT INFECTION WITHOUT HEMATURIA, SITE UNSPECIFIED: Primary | ICD-10-CM

## 2018-12-17 DIAGNOSIS — N30.00 ACUTE CYSTITIS WITHOUT HEMATURIA: Primary | ICD-10-CM

## 2018-12-17 RX ORDER — CEPHALEXIN 500 MG/1
500 CAPSULE ORAL 3 TIMES DAILY
Qty: 21 CAPSULE | Refills: 0 | Status: CANCELLED | OUTPATIENT
Start: 2018-12-17 | End: 2018-12-24

## 2018-12-17 RX ORDER — CEPHALEXIN 500 MG/1
500 CAPSULE ORAL 3 TIMES DAILY
Qty: 21 CAPSULE | Refills: 0 | Status: SHIPPED | OUTPATIENT
Start: 2018-12-17 | End: 2018-12-24

## 2018-12-17 RX ORDER — SACCHAROMYCES BOULARDII 250 MG
250 CAPSULE ORAL 2 TIMES DAILY
Qty: 14 CAPSULE | Refills: 0 | Status: SHIPPED | OUTPATIENT
Start: 2018-12-17 | End: 2019-02-10 | Stop reason: ALTCHOICE

## 2018-12-17 NOTE — TELEPHONE ENCOUNTER
[facility] called and asked if you could prescribe florastor 250mg BID for pt to take with her antibotic to prevent diarrhea       Mee Mathur

## 2018-12-17 NOTE — TELEPHONE ENCOUNTER
[facility] called and stated they faxed over pts UA results yesterday, asking if you reviewed them  States pt has been having a lot more confusiion

## 2018-12-20 ENCOUNTER — TELEPHONE (OUTPATIENT)
Dept: FAMILY MEDICINE CLINIC | Facility: CLINIC | Age: 83
End: 2018-12-20

## 2018-12-20 ENCOUNTER — OFFICE VISIT (OUTPATIENT)
Dept: FAMILY MEDICINE CLINIC | Facility: CLINIC | Age: 83
End: 2018-12-20
Payer: MEDICARE

## 2018-12-20 VITALS
SYSTOLIC BLOOD PRESSURE: 100 MMHG | HEIGHT: 61 IN | BODY MASS INDEX: 16.95 KG/M2 | DIASTOLIC BLOOD PRESSURE: 50 MMHG | WEIGHT: 89.8 LBS

## 2018-12-20 DIAGNOSIS — S72.141D CLOSED 2-PART INTERTROCHANTERIC FRACTURE OF RIGHT FEMUR WITH ROUTINE HEALING, SUBSEQUENT ENCOUNTER: ICD-10-CM

## 2018-12-20 DIAGNOSIS — R63.4 WEIGHT LOSS: Primary | ICD-10-CM

## 2018-12-20 DIAGNOSIS — I25.10 CORONARY ARTERY DISEASE INVOLVING NATIVE CORONARY ARTERY OF NATIVE HEART WITHOUT ANGINA PECTORIS: ICD-10-CM

## 2018-12-20 DIAGNOSIS — F41.1 GENERALIZED ANXIETY DISORDER: ICD-10-CM

## 2018-12-20 DIAGNOSIS — E03.8 OTHER SPECIFIED HYPOTHYROIDISM: Chronic | ICD-10-CM

## 2018-12-20 DIAGNOSIS — Z86.79 HISTORY OF SUBDURAL HEMORRHAGE: ICD-10-CM

## 2018-12-20 DIAGNOSIS — E63.9 POOR NUTRITION: ICD-10-CM

## 2018-12-20 PROBLEM — I10 BENIGN ESSENTIAL HYPERTENSION: Chronic | Status: RESOLVED | Noted: 2017-11-22 | Resolved: 2018-12-20

## 2018-12-20 PROBLEM — F03.91 DEMENTIA WITH BEHAVIORAL DISTURBANCE (HCC): Status: ACTIVE | Noted: 2018-02-16

## 2018-12-20 PROCEDURE — 99215 OFFICE O/P EST HI 40 MIN: CPT | Performed by: FAMILY MEDICINE

## 2018-12-20 PROCEDURE — 1124F ACP DISCUSS-NO DSCNMKR DOCD: CPT | Performed by: FAMILY MEDICINE

## 2018-12-20 RX ORDER — MIRTAZAPINE 15 MG/1
7.5 TABLET, FILM COATED ORAL
Qty: 15 TABLET | Refills: 1 | Status: SHIPPED | OUTPATIENT
Start: 2018-12-20 | End: 2019-03-04 | Stop reason: SDUPTHER

## 2018-12-20 NOTE — PROGRESS NOTES
TCM Call (since 11/19/2018)     Date and time call was made  11/30/2018 11:05 AM    Hospital care reviewed  Records reviewed    Patient was hospitialized at  [address]    Date of Admission  10/30/18    Date of discharge  11/28/18    Diagnosis  Closed 2-part intertrochanteric fracture of proximal end of right femur     Disposition  Home    Current Symptoms  None      TCM Call (since 11/19/2018)     Post hospital issues  None    Did you obtain your prescribed medications  Yes    Do you need help managing your prescriptions or medications  No    Is transportation to your appointment needed  No    I have advised the patient to call PCP with any new or worsening symptoms  Eugenie Tao CMA    Living Arrangements  Alone    Counseling  Family

## 2018-12-20 NOTE — TELEPHONE ENCOUNTER
Shyann from [facility] called to let you know that Roxanne Adan has been having increased emotional outbursts  She wanted to know how you felt about [facility]-would you consider this  She just wanted to give you this info before she is seen today

## 2018-12-20 NOTE — PROGRESS NOTES
Assessment/Plan:    Patient here today brought in by her daughter the who is an RN someone have known for over 25 years patient unfortunately seems to be dwindling with her health  She has lost weight  I did advance her diet from pureed to mechanical soft on this past Saturday  Patient's labs are fairly up-to-date with her most recent hospitalization  Concern right now is the patient is in assisted living  Daughter and I have talked that we will have a low threshold to send her right over to the nursing home part [facility]  Patient may need to be on hospice in the near future  Daughter will stay in touch with me  I will add a little bit of Remeron to try to see if it will increase her appetite  Approximately 45 minutes was used in review of the hospitalization current clinical status  Greater 50 percent has been counseling  Diagnoses and all orders for this visit:    Weight loss    Poor nutrition    Other specified hypothyroidism    Generalized anxiety disorder    History of subdural hemorrhage    Closed 2-part intertrochanteric fracture of right femur with routine healing, subsequent encounter    Coronary artery disease involving native coronary artery of native heart without angina pectoris        1  Weight loss     2  Poor nutrition     3  Other specified hypothyroidism     4  Generalized anxiety disorder     5  History of subdural hemorrhage     6  Closed 2-part intertrochanteric fracture of right femur with routine healing, subsequent encounter     7  Coronary artery disease involving native coronary artery of native heart without angina pectoris         Subjective:        Patient ID: Kwesi Antonio is a 80 y o  female  Chief Complaint   Patient presents with    Transition of Care Management     surgey on Nov 1 then went to rehab; pt states that she is not feeling to good  pt states that when she is having a hard time walking  R neck pain   Discuss appetite pt daughter states that she is not eating and is lossing weight     Leg Pain     L leg pain     Immunizations     discuss t-dap       Patient here status post hospitalization bit back status post fall and head injury as well as broken femur  She was then subsequently released to [facility]   Daughter was very unhappy with the care there  Patient did developed lice there which have now improved  [facility] sent me a message earlier today that they think she might need to go to [facility] older adult unit  Daughter does not want that  Patient has not been eating the pureed food  I did increase it to mechanical soft this past Saturday  When alert the patient can converse somewhat with you  Otherwise she goes into sort of a withdrawn mode      Leg Pain          The following portions of the patient's history were reviewed and updated as appropriate: past medical history, past surgical history and problem list       Review of Systems   Constitutional: Positive for appetite change and unexpected weight change  Negative for fatigue and fever  HENT: Negative for congestion, ear pain, postnasal drip, rhinorrhea, sinus pain, sinus pressure and sore throat  Eyes: Negative for redness and visual disturbance  Respiratory: Negative for chest tightness and shortness of breath  Cardiovascular: Negative for chest pain, palpitations and leg swelling  Gastrointestinal: Negative for abdominal distention, abdominal pain, diarrhea and nausea  Endocrine: Negative for cold intolerance and heat intolerance  Genitourinary: Negative for dysuria and hematuria  Musculoskeletal: Negative for arthralgias, gait problem and myalgias  Skin: Negative for pallor and rash  Neurological: Negative for dizziness and headaches  Psychiatric/Behavioral: Negative for behavioral problems  The patient is nervous/anxious  Intermittent memory issues    Thought content always rational         Objective:  /50 (BP Location: Left arm, Patient Position: Sitting, Cuff Size: Standard)   Ht 5' 1" (1 549 m)   Wt 40 7 kg (89 lb 12 8 oz)   BMI 16 97 kg/m²        Physical Exam   HENT:   Head: Normocephalic  Mouth/Throat: No oropharyngeal exudate  Oral mucosa dry   Eyes: Conjunctivae are normal  No scleral icterus  Neck: No thyromegaly present  Cardiovascular: Normal rate, regular rhythm and normal heart sounds  Pulmonary/Chest: Breath sounds normal    Abdominal: Soft  She exhibits no distension and no mass  There is no tenderness  Musculoskeletal: She exhibits no edema  Lymphadenopathy:     She has no cervical adenopathy  Neurological:   Not oriented x3   Psychiatric:   Thought content sometimes reasonable but other times not  Loses train of thought multiple times  Forgets things  When not speaking directly to her she was goes right into his sleep mode   Nursing note and vitals reviewed

## 2018-12-21 ENCOUNTER — TELEPHONE (OUTPATIENT)
Dept: FAMILY MEDICINE CLINIC | Facility: CLINIC | Age: 83
End: 2018-12-21

## 2018-12-21 NOTE — TELEPHONE ENCOUNTER
Can you double check reorder will be wrote the last time she had lice just about 2 weeks ago    I can't find anywhere

## 2018-12-21 NOTE — TELEPHONE ENCOUNTER
[facility] called and stated they think pt has lice again  Asking if you can order the [facility] lice shampoo again for pt      Fax- [phone]

## 2018-12-23 ENCOUNTER — APPOINTMENT (EMERGENCY)
Dept: RADIOLOGY | Facility: HOSPITAL | Age: 83
End: 2018-12-23
Payer: MEDICARE

## 2018-12-23 ENCOUNTER — APPOINTMENT (EMERGENCY)
Dept: CT IMAGING | Facility: HOSPITAL | Age: 83
End: 2018-12-23
Payer: MEDICARE

## 2018-12-23 ENCOUNTER — HOSPITAL ENCOUNTER (EMERGENCY)
Facility: HOSPITAL | Age: 83
Discharge: HOME | End: 2018-12-23
Payer: MEDICARE

## 2018-12-23 VITALS
RESPIRATION RATE: 18 BRPM | DIASTOLIC BLOOD PRESSURE: 60 MMHG | SYSTOLIC BLOOD PRESSURE: 127 MMHG | OXYGEN SATURATION: 98 % | TEMPERATURE: 98 F | HEART RATE: 63 BPM

## 2018-12-23 DIAGNOSIS — S09.90XA ACUTE HEAD INJURY WITHOUT LOSS OF CONSCIOUSNESS, INITIAL ENCOUNTER: ICD-10-CM
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
## 2018-12-23 DIAGNOSIS — M54.50 LOW BACK PAIN: ICD-10-CM

## 2018-12-23 LAB
ANION GAP SERPL CALCULATED.3IONS-SCNC: 8 MMOL/L (ref 4–13)
BASOPHILS # BLD AUTO: 0.04 THOUSANDS/ΜL (ref 0–0.1)
BASOPHILS NFR BLD AUTO: 0 % (ref 0–1)
BUN SERPL-MCNC: 25 MG/DL (ref 5–25)
CALCIUM SERPL-MCNC: 9.9 MG/DL (ref 8.3–10.1)
CHLORIDE SERPL-SCNC: 103 MMOL/L (ref 100–108)
CO2 SERPL-SCNC: 33 MMOL/L (ref 21–32)
CREAT SERPL-MCNC: 0.78 MG/DL (ref 0.6–1.3)
EOSINOPHIL # BLD AUTO: 0.09 THOUSAND/ΜL (ref 0–0.61)
EOSINOPHIL NFR BLD AUTO: 1 % (ref 0–6)
ERYTHROCYTE [DISTWIDTH] IN BLOOD BY AUTOMATED COUNT: 15.1 % (ref 11.6–15.1)
GFR SERPL CREATININE-BSD FRML MDRD: 65 ML/MIN/1.73SQ M
GLUCOSE SERPL-MCNC: 91 MG/DL (ref 65–140)
HCT VFR BLD AUTO: 41.4 % (ref 34.8–46.1)
HGB BLD-MCNC: 12.9 G/DL (ref 11.5–15.4)
IMM GRANULOCYTES # BLD AUTO: 0.04 THOUSAND/UL (ref 0–0.2)
IMM GRANULOCYTES NFR BLD AUTO: 0 % (ref 0–2)
LYMPHOCYTES # BLD AUTO: 1.37 THOUSANDS/ΜL (ref 0.6–4.47)
LYMPHOCYTES NFR BLD AUTO: 13 % (ref 14–44)
MCH RBC QN AUTO: 33.2 PG (ref 26.8–34.3)
MCHC RBC AUTO-ENTMCNC: 31.2 G/DL (ref 31.4–37.4)
MCV RBC AUTO: 107 FL (ref 82–98)
MONOCYTES # BLD AUTO: 0.4 THOUSAND/ΜL (ref 0.17–1.22)
MONOCYTES NFR BLD AUTO: 4 % (ref 4–12)
NEUTROPHILS # BLD AUTO: 8.91 THOUSANDS/ΜL (ref 1.85–7.62)
NEUTS SEG NFR BLD AUTO: 82 % (ref 43–75)
NRBC BLD AUTO-RTO: 0 /100 WBCS
PLATELET # BLD AUTO: 185 THOUSANDS/UL (ref 149–390)
PMV BLD AUTO: 9.1 FL (ref 8.9–12.7)
POTASSIUM SERPL-SCNC: 3.7 MMOL/L (ref 3.5–5.3)
RBC # BLD AUTO: 3.88 MILLION/UL (ref 3.81–5.12)
SODIUM SERPL-SCNC: 144 MMOL/L (ref 136–145)
TROPONIN I SERPL-MCNC: <0.02 NG/ML
WBC # BLD AUTO: 10.85 THOUSAND/UL (ref 4.31–10.16)

## 2018-12-23 PROCEDURE — 71046 X-RAY EXAM CHEST 2 VIEWS: CPT

## 2018-12-23 PROCEDURE — 70450 CT HEAD/BRAIN W/O DYE: CPT

## 2018-12-23 PROCEDURE — 93005 ELECTROCARDIOGRAM TRACING: CPT

## 2018-12-23 PROCEDURE — 36415 COLL VENOUS BLD VENIPUNCTURE: CPT | Performed by: PHYSICIAN ASSISTANT

## 2018-12-23 PROCEDURE — 72131 CT LUMBAR SPINE W/O DYE: CPT

## 2018-12-23 PROCEDURE — 85025 COMPLETE CBC W/AUTO DIFF WBC: CPT | Performed by: PHYSICIAN ASSISTANT

## 2018-12-23 PROCEDURE — 84484 ASSAY OF TROPONIN QUANT: CPT | Performed by: PHYSICIAN ASSISTANT

## 2018-12-23 PROCEDURE — 80048 BASIC METABOLIC PNL TOTAL CA: CPT | Performed by: PHYSICIAN ASSISTANT

## 2018-12-23 PROCEDURE — 72125 CT NECK SPINE W/O DYE: CPT

## 2018-12-23 PROCEDURE — 99285 EMERGENCY DEPT VISIT HI MDM: CPT

## 2018-12-23 RX ORDER — CLOTRIMAZOLE AND BETAMETHASONE DIPROPIONATE 10; .5 MG/ML; MG/ML
LOTION TOPICAL 2 TIMES DAILY
COMMUNITY

## 2018-12-23 NOTE — ED PROVIDER NOTES
History  Chief Complaint   Patient presents with    Fall     Pt arrives via EMS from [facility], per EMS pt had a witnessed fall from standing position while trying to ambulate with walker, pt is oriented to place, has hx of dementia  Pt c/o lower back pain  takes daily aspirin     89y  o female with PMH of anxiety, basal cell carcinoma, HTN, CAD, dementia, depression, diverticulosis, HLD, hypothyroid, insomnia, MI, subdural hematoma, TIA, unstable angina and vertigo presents to the ER for a trip and fall occurring this morning  Patient is a resident at [facility]  Per EMS, staff witnessed patient patient fall  Patient states she did hit her head but denies LOC  She complains of low back pain  She denies taking any medication for pain  She is unable to describe her pain  She denies radiation of pain  She rates her pain 6/10 and states it comes and goes  She denies fever, chills, chest pain, dyspnea, N/V/D, abdominal pain or weakness  History provided by:  Patient   used: No        Prior to Admission Medications   Prescriptions Last Dose Informant Patient Reported? Taking?    ALPRAZolam (XANAX) 0 25 mg tablet   Yes Yes   Sig: Take 1 tablet by mouth as needed     Artificial Saliva (BIOTENE MOISTURIZING MOUTH) SOLN   Yes Yes   Sig: Apply to the mouth or throat   D3-1000 1000 units tablet   No Yes   Sig: TAKE 1 TABLET ORALLY DAILY (SUPPLEMENT)   LOPERAMIDE HCL PO   Yes Yes   Sig: Take 2 mg by mouth as needed   MAPAP 325 MG tablet   No Yes   Sig: TAKE 2 TABLETS (650MG) ORALLY EVERY 8 HOURS FOR PAIN *NOT TO EXCEED 4GMS APAP/24HRS*   Menthol, Topical Analgesic, (BENGAY EX)   Yes Yes   Sig: Apply topically   acetaminophen (TYLENOL) 500 mg tablet   Yes Yes   Sig: Take 500 mg by mouth every 6 (six) hours as needed for mild pain   aspirin 81 mg chewable tablet   No Yes   Sig: CHEW 1 TABLET AND SWALLOW ORALLY DAILY ON MONDAY, WEDNESDAY & FRIDAY (HEART)   cephalexin (KEFLEX) 500 mg capsule No Yes   Sig: Take 1 capsule (500 mg total) by mouth 3 (three) times a day for 7 days   clotrimazole-betamethasone (LOTRISONE) 1-0 05 % lotion   Yes Yes   Sig: Apply topically 2 (two) times a day   divalproex sodium (DEPAKOTE) 250 mg EC tablet   No Yes   Sig: TAKE 1 TABLET ORALLY AT BEDTIME (DO NOT CRUSH) (ALTERED MENTAL STATUS/ANXIETY DISORDER)   docusate sodium (COLACE) 100 mg capsule   No Yes   Sig: Take 1 capsule (100 mg total) by mouth 2 (two) times a day   donepezil (ARICEPT) 10 mg tablet   No Yes   Sig: TAKE 1 TABLET ORALLY DAILY (MEMORY LOSS)   escitalopram (LEXAPRO) 10 mg tablet   No Yes   Sig: TAKE 1 TABLET ORALLY DAILY (ANXIETY DISORDER)   levothyroxine 75 mcg tablet   No Yes   Sig: TAKE 1 TABLET ORALLY DAILY (THYROID)   metoprolol tartrate (LOPRESSOR) 25 mg tablet   No Yes   Sig: TAKE 1 TABLET ORALLY TWICE DAILY ( BLOOD PRESSURE )   mirtazapine (REMERON) 15 mg tablet   No Yes   Sig: Take 0 5 tablets (7 5 mg total) by mouth daily at bedtime   oxyCODONE (ROXICODONE) 5 mg immediate release tablet   No Yes   Sig: Take 0 5 tablets (2 5 mg total) by mouth every 4 (four) hours as needed for moderate pain Max Daily Amount: 15 mg   Patient taking differently: Take 2 5 mg by mouth every 6 (six) hours as needed for moderate pain     polyethylene glycol (GLYCOLAX) powder   Yes Yes   Sig: Take 17 g by mouth daily   saccharomyces boulardii (FLORASTOR) 250 mg capsule   No Yes   Sig: Take 1 capsule (250 mg total) by mouth 2 (two) times a day      Facility-Administered Medications: None       Past Medical History:   Diagnosis Date    Abnormal CT scan, neck     Last Assessed:  5/28/14    Altered mental status 11/22/2017    Anxiety     Basal cell adenocarcinoma     Benign essential hypertension     Last Assessed:  1/14/13    Closed 2-part intertrochanteric fracture of proximal end of right femur ([facility] ) 10/30/2018    Coronary artery disease     Dementia 2006    Depression     Diverticulosis     Essential hypertension 11/22/2017    Hyperlipidemia     Hypertension     Hypothyroid 11/22/2017    Hypothyroid     Insomnia     Long term (current) use of antithrombotics/antiplatelets     Last Assessed:  9/23/13    Lytic bone lesion of right femur 10/30/2018    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 ([facility] )     MI [facility]ocardial infarction) ([facility] )     Last Assessed:  6/11/15    [facility] hemat[facility]traumatic [facility] 2014    left sided, treated nonoperatively    Subdural hygroma 2014    s/p subdural hematoma    TIA (transient ischemic attack)     Unstable angina ([facility] ) 2015    Vertigo        Past Surgical History:   Procedure Laterality Date    BREAST LUMPECTOMY Right     CATARACT EXTRACTION      CHOLECYSTECTOMY      COLONOSCOPY  2003    HYSTERECTOMY      Total abdominal with removal of both ovaries    [place] OPEN RX FEMUR FX+INTRAMED ANTOINETTE Right 10/31/2018    Procedure: INSERTION NAIL IM FEMUR ANTEGRADE (TROCHANTERIC); Surgeon: Travis Gonzalez DO;  Location: [facility];  Service: Orthopedics       Family History   Problem Relation Age of Onset    Diabetes Mother     Lung cancer Mother     Colon cancer Father     Dementia Father     Prostate cancer Brother      I have reviewed and agree with the history as documented  Social History   Substance Use Topics    Smoking status: Never Smoker    Smokeless tobacco: Never Used    Alcohol use No      Comment: Per [employer]:  Social drinker        Review of Systems   Constitutional: Negative for chills and fever  HENT: Negative for facial swelling  Eyes: Negative for redness  Respiratory: Negative for shortness of breath  Cardiovascular: Negative for chest pain  Gastrointestinal: Negative for abdominal pain, diarrhea, nausea and vomiting  Musculoskeletal: Positive for back pain  Negative for neck stiffness  Skin: Negative for rash  Allergic/Immunologic: Negative for food allergies     Neurological: Negative for weakness and numbness  Physical Exam  Physical Exam   Constitutional:  Non-toxic appearance  No distress  HENT:   Head: Normocephalic and atraumatic  Right Ear: Tympanic membrane, external ear and ear canal normal  No drainage, swelling or tenderness  No foreign bodies  Tympanic membrane is not erythematous  No hemotympanum  Left Ear: Tympanic membrane, external ear and ear canal normal  No drainage, swelling or tenderness  No foreign bodies  Tympanic membrane is not erythematous  No hemotympanum  Nose: Nose normal    Mouth/Throat: Uvula is midline, oropharynx is clear and moist and mucous membranes are normal  No uvula swelling  No posterior oropharyngeal edema, posterior oropharyngeal erythema or tonsillar abscesses  No tonsillar exudate  Eyes: Pupils are equal, round, and reactive to light  Conjunctivae and EOM are normal    Neck: Normal range of motion and phonation normal  Neck supple  No tracheal deviation present  Cardiovascular: Normal rate, regular rhythm, S1 normal, S2 normal and normal heart sounds  Exam reveals no gallop and no friction rub  No murmur heard  Pulmonary/Chest: Effort normal and breath sounds normal  No respiratory distress  She has no decreased breath sounds  She has no wheezes  She has no rhonchi  She has no rales  She exhibits no tenderness  Abdominal: Soft  Bowel sounds are normal  She exhibits no distension  There is no tenderness  There is no rebound and no guarding  Musculoskeletal: Normal range of motion  She exhibits no edema or deformity  Right hip: Normal         Left hip: Normal         Right knee: Normal         Left knee: Normal         Right ankle: Normal         Left ankle: Normal         Cervical back: Normal         Thoracic back: Normal         Lumbar back: She exhibits tenderness, bony tenderness and pain  She exhibits normal range of motion, no swelling, no edema and no deformity          Right upper leg: Normal         Left upper leg: Normal  Right lower leg: Normal         Left lower leg: Normal    Neurological: She is alert  She has normal strength  No cranial nerve deficit or sensory deficit  She exhibits normal muscle tone  Gait normal  GCS eye subscore is 4  GCS verbal subscore is 5  GCS motor subscore is 6  Skin: Skin is warm and dry  No rash noted  Psychiatric: She has a normal mood and affect  Nursing note and vitals reviewed        Vital Signs  ED Triage Vitals [12/23/18 0607]   Temperature Pulse Respirations Blood Pressure SpO2   98 °F (36 7 °C) 66 16 142/66 96 %      Temp Source Heart Rate Source Patient Position - Orthostatic VS BP Location FiO2 (%)   Oral Monitor Sitting Right arm --      Pain Score       4           Vitals:    12/23/18 0607 12/23/18 0817 12/23/18 1137   BP: 142/66 144/63 127/60   Pulse: 66 65 63   Patient Position - Orthostatic VS: Sitting Lying Lying       Visual Acuity      ED Medications  Medications - No data to display    Diagnostic Studies  Results Reviewed     Procedure Component Value Units Date/Time    CBC and differential [[id number]]  (Abnormal) Collected:  12/23/18 0913    Lab Status:  Final result Specimen:  Blood from Arm, Right Updated:  12/23/18 1015     WBC 10 85 (H) Thousand/uL      RBC 3 88 Million/uL      Hemoglobin 12 9 g/dL      Hematocrit 41 4 %       (H) fL      MCH 33 2 pg      MCHC 31 2 (L) g/dL      RDW 15 1 %      MPV 9 1 fL      Platelets 279 Thousands/uL      nRBC 0 /100 WBCs      Neutrophils Relative 82 (H) %      Immat GRANS % 0 %      Lymphocytes Relative 13 (L) %      Monocytes Relative 4 %      Eosinophils Relative 1 %      Basophils Relative 0 %      Neutrophils Absolute 8 91 (H) Thousands/µL      Immature Grans Absolute 0 04 Thousand/uL      Lymphocytes Absolute 1 37 Thousands/µL      Monocytes Absolute 0 40 Thousand/µL      Eosinophils Absolute 0 09 Thousand/µL      Basophils Absolute 0 04 Thousands/µL     Troponin I [[id number]]  (Normal) Collected:  12/23/18 0913    Lab Status:  Final result Specimen:  Blood from Arm, Right Updated:  12/23/18 0939     Troponin I <0 02 ng/mL     Basic metabolic panel [[id number]]  (Abnormal) Collected:  12/23/18 0913    Lab Status:  Final result Specimen:  Blood from Arm, Right Updated:  12/23/18 0931     Sodium 144 mmol/L      Potassium 3 7 mmol/L      Chloride 103 mmol/L      CO2 33 (H) mmol/L      ANION GAP 8 mmol/L      BUN 25 mg/dL      Creatinine 0 78 mg/dL      Glucose 91 mg/dL      Calcium 9 9 mg/dL      eGFR 65 ml/min/1 73sq m     Narrative:         National Kidney Disease Education Program recommendations are as follows:  GFR calculation is accurate only with a steady state creatinine  Chronic Kidney disease less than 60 ml/min/1 73 sq  meters  Kidney failure less than 15 ml/min/1 73 sq  meters  POCT urinalysis dipstick [[id number]]     Lab Status:  No result Specimen:  Urine                  XR chest 2 views   ED Interpretation by Amrik Galindo PA-C (12/23 3745)   No acute cardiopulmonary findings seen by me at this time  CT lumbar spine without contrast   Final Result by Vonnie Villafana MD (12/23 5550)      Degenerative changes  Scoliosis  No fractures  Workstation performed: [id number]         CT cervical spine without contrast   Final Result by Vonnie Villafana MD (12/23 6534)      No cervical spine fracture or traumatic malalignment  Degenerative changes  Workstation performed: [id number]         CT head without contrast   Final Result by Vonnie Villafana MD (12/23 1170)      No acute intracranial abnormality  Microangiopathic change  Atrophy                 Workstation performed: [id number]                    Procedures  ECG 12 Lead Documentation  Date/Time: 12/23/2018 9:26 AM  Performed by: Mayra Soto  Authorized by: Mayra Soto     Indications / Diagnosis:  "more confused" per nursing home staff  ECG reviewed by me, the ED Provider: yes (Also reviewed by Dr Beryle Poe)    Patient location:  ED  Interpretation:     Interpretation: abnormal    Rate:     ECG rate:  58    ECG rate assessment: normal    Rhythm:     Rhythm: sinus rhythm    Ectopy:     Ectopy: none    QRS:     QRS axis:  Normal    QRS intervals:  Normal  Conduction:     Conduction: normal    ST segments:     ST segments:  Normal  T waves:     T waves: inverted      Inverted:  AVR, aVL, V1 and V2           Phone Contacts  ED Phone Contact    ED Course  ED Course as of Dec 23 1155   Sun Dec 23, 2018   Stefania Rhodes with nursing staff at [facility]  Staff witnessed patient trip over walker this morning  Patient wouldn't let staff assist her up and was accusing staff of tripping her  She is on antibiotics currently for uti  Staff has noticed decreased eating but also state patient has been starting a new diet of thick liquids, which she is not happy about  Will check labs  MDM  Number of Diagnoses or Management Options  Acute head injury without loss of consciousness, initial encounter: new and requires workup  Fall, initial encounter: new and requires workup  Low back pain: new and requires workup  Diagnosis management comments: DDX consists of but not limited to: fracture, contusion, dislocation, sprain, strain, intracranial abnormality    Will CT head, cervical spine and lumbar spine  411 Spoke with nursing staff at [facility]  Staff witnessed patient trip over walker this morning  Patient wouldn't let staff assist her up and was accusing staff of tripping her  She is on antibiotics currently for uti  Staff has noticed decreased eating but also state patient has been starting a new diet of thick liquids, which she is not happy about  Will check labs  At discharge, I instructed the patient to:  -follow up with pcp  -rest and drink plenty of fluids  -return to the ER if symptoms worsened or new symptoms arose  Patient agreed to this plan and was stable at time of discharge           Amount and/or Complexity of Data Reviewed  Clinical lab tests: ordered and reviewed  Tests in the radiology section of CPT®: ordered and reviewed  Obtain history from someone other than the patient: yes (Spoke with patient's family and staff at [facility])  Review and summarize past medical records: yes  Independent visualization of images, tracings, or specimens: yes    Patient Progress  Patient progress: stable    CritCare Time    Disposition  Final diagnoses:   Fall, initial encounter   Low back pain   Acute head injury without loss of consciousness, initial encounter     Time reflects when diagnosis was documented in both MDM as applicable and the Disposition within this note     Time User Action Codes Description Comment    12/23/2018 11:38 AM Morene Quill A Add [S84  HPBE] Fall, initial encounter     12/23/2018 11:38 AM Morene Quill A Add [M54 5] Low back pain     12/23/2018 11:38 AM Morene Quill A Add [S09 90XA] Acute head injury without loss of consciousness, initial encounter       ED Disposition     ED Disposition Condition Comment    Discharge  Rajwinder Martinez discharge to home/self care  Condition at discharge: Stable        Follow-up Information     Follow up With Specialties Details Why Contact Info    Francine Lovelace DO Family Medicine Schedule an appointment as soon as possible for a visit in 1 day  [address] [place] [address]  [phone]            Patient's Medications   Discharge Prescriptions    No medications on file     No discharge procedures on file      ED Provider  Electronically Signed by           Randy Scott PA-C  12/23/18 6162

## 2018-12-23 NOTE — DISCHARGE INSTRUCTIONS
Acute Low Back Pain   WHAT YOU NEED TO KNOW:   Acute low back pain is sudden discomfort in your lower back area that lasts for up to 6 weeks  The discomfort makes it difficult to tolerate activity  DISCHARGE INSTRUCTIONS:   Return to the emergency department if:   · You have severe pain  · You have sudden stiffness and heaviness on both buttocks down to both legs  · You have numbness or weakness in one leg, or pain in both legs  · You have numbness in your genital area or across your lower back  · You cannot control your urine or bowel movements  Contact your healthcare provider if:   · You have a fever  · You have pain at night or when you rest     · Your pain does not get better with treatment  · You have pain that worsens when you cough or sneeze  · You suddenly feel something pop or snap in your back  · You have questions or concerns about your condition or care  Medicines: The following medicines may be ordered by your healthcare provider:  · Acetaminophen  decreases pain  It is available without a doctor's order  Ask how much to take and how often to take it  Follow directions  Acetaminophen can cause liver damage if not taken correctly  · NSAIDs  help decrease swelling and pain  This medicine is available with or without a doctor's order  NSAIDs can cause stomach bleeding or kidney problems in certain people  If you take blood thinner medicine, always ask your healthcare provider if NSAIDs are safe for you  Always read the medicine label and follow directions  · Prescription pain medicine  may be given  Ask your healthcare provider how to take this medicine safely  · Muscle relaxers  decrease pain by relaxing the muscles in your lower spine  · Take your medicine as directed  Contact your healthcare provider if you think your medicine is not helping or if you have side effects  Tell him of her if you are allergic to any medicine   Keep a list of the medicines, vitamins, and herbs you take  Include the amounts, and when and why you take them  Bring the list or the pill bottles to follow-up visits  Carry your medicine list with you in case of an emergency  Self-care:   · Stay active  as much as you can without causing more pain  Bed rest could make your back pain worse  Start with some light exercises such as walking  Avoid heavy lifting until your pain is gone  Ask for more information about the activities or exercises that are right for you  · Ice  helps decrease swelling, pain, and muscle spams  Put crushed ice in a plastic bag  Cover it with a towel  Place it on your lower back for 20 to 30 minutes every 2 hours  Do this for about 2 to 3 days after your pain starts, or as directed  · Heat  helps decrease pain and muscle spasms  Start to use heat after treatment with ice has stopped  Use a small towel dampened with warm water or a heating pad, or sit in a warm bath  Apply heat on the area for 20 to 30 minutes every 2 hours for as many days as directed  Alternate heat and ice  Prevent acute low back pain:   · Use proper body mechanics  ¨ Bend at the hips and knees when you  objects  Do not bend from the waist  Use your leg muscles as you lift the load  Do not use your back  Keep the object close to your chest as you lift it  Try not to twist or lift anything above your waist     ¨ Change your position often when you stand for long periods of time  Rest one foot on a small box or footrest, and then switch to the other foot often  ¨ Try not to sit for long periods of time  When you do, sit in a straight-backed chair with your feet flat on the floor  Never reach, pull, or push while you are sitting  · Do exercises that strengthen your back muscles  Warm up before you exercise  Ask your healthcare provider the best exercises for you  · Maintain a healthy weight  Ask your healthcare provider how much you should weigh   Ask him to help you create a weight loss plan if you are overweight  Follow up with your healthcare provider as directed:  Return for a follow-up visit if you still have pain after 1 to 3 weeks of treatment  You may need to visit an orthopedist if your back pain lasts more than 12 weeks  Write down your questions so you remember to ask them during your visits  © 2017 [address] Information is for End User's use only and may not be sold, redistributed or otherwise used for commercial purposes  All illustrations and images included in CareNotes® are the copyrighted property of A D [employer] M , [employer]  or [employer]  The above information is an  only  It is not intended as medical advice for individual conditions or treatments  Talk to your doctor, nurse or pharmacist before following any medical regimen to see if it is safe and effective for you  Head Injury   WHAT YOU NEED TO KNOW:   A head injury is most often caused by a blow to the head  This may occur from a fall, bicycle injury, sports injury, being struck in the head, or a motor vehicle accident  DISCHARGE INSTRUCTIONS:   Call 911 or have someone else call for any of the following:   · You cannot be woken  · You have a seizure  · You stop responding to others or you faint  · You have blurry or double vision  · Your speech becomes slurred or confused  · You have arm or leg weakness, loss of feeling, or new problems with coordination  · Your pupils are larger than usual or one pupil is a different size than the other  · You have blood or clear fluid coming out of your ears or nose  Return to the emergency department if:   · You have repeated or forceful vomiting  · You feel confused  · Your headache gets worse or becomes severe  · You or someone caring for you notices that you are harder to wake than usual   Contact your healthcare provider if:   · Your symptoms last longer than 6 weeks after the injury      · You have questions or concerns about your condition or care  Medicines:   · Acetaminophen  decreases pain  Acetaminophen is available without a doctor's order  Ask how much to take and how often to take it  Follow directions  Acetaminophen can cause liver damage if not taken correctly  · Take your medicine as directed  Contact your healthcare provider if you think your medicine is not helping or if you have side effects  Tell him or her if you are allergic to any medicine  Keep a list of the medicines, vitamins, and herbs you take  Include the amounts, and when and why you take them  Bring the list or the pill bottles to follow-up visits  Carry your medicine list with you in case of an emergency  Self-care:   · Rest  or do quiet activities for 24 to 48 hours  Limit your time watching TV, using the computer, or doing tasks that require a lot of thinking  Slowly return to your normal activities as directed  Do not play sports or do activities that may cause you to get hit in the head  Ask your healthcare provider when you can return to sports  · Apply ice  on your head for 15 to 20 minutes every hour or as directed  Use an ice pack, or put crushed ice in a plastic bag  Cover it with a towel before you apply it to your skin  Ice helps prevent tissue damage and decreases swelling and pain  · Have someone stay with you for 24 hours  or as directed  This person can monitor you for complications and call 531  When you are awake the person should ask you a few questions to see if you are thinking clearly  An example would be to ask your name or your address  Prevent another head injury:   · Wear a helmet that fits properly  Do this when you play sports, or ride a bike, scooter, or skateboard  Helmets help decrease your risk of a serious head injury  Talk to your healthcare provider about other ways you can protect yourself if you play sports  · Wear your seat belt every time you are in a car    This helps to decrease your risk for a head injury if you are in a car accident  Follow up with your healthcare provider as directed:  Write down your questions so you remember to ask them during your visits  © 2017 [address] [employer] Information is for End User's use only and may not be sold, redistributed or otherwise used for commercial purposes  All illustrations and images included in CareNotes® are the copyrighted property of A [employer]  or [employer]  The above information is an  only  It is not intended as medical advice for individual conditions or treatments  Talk to your doctor, nurse or pharmacist before following any medical regimen to see if it is safe and effective for you  DISCHARGE INSTRUCTIONS:    FOLLOW UP WITH YOUR PRIMARY CARE PROVIDER OR THE [address] [place] [address]  MAKE AN APPOINTMENT TO BE SEEN  REST AND DRINK PLENTY OF FLUIDS  IF SYMPTOMS WORSEN OR NEW SYMPTOMS ARISE, RETURN TO THE ER TO BE SEEN

## 2018-12-23 NOTE — ED NOTES
Attempt to collect urine sample at this time  Pt not able to provide urine sample    Per family member they do not think that patient needs urine sample taken at this due to the fact that she has current UTI and is being treated with Antibx at present time     Grabiel Barba RN  12/23/18 9088

## 2018-12-23 NOTE — ED NOTES
Family at bedside, at first hesitant, reporting that they had told faciltiy that pt resides at that they did not want BW done, but at this time family is OK to have all labs drawn ,IV inserted and work up done that is ordered for pt        Severa Mart, RN  12/23/18 8050

## 2018-12-23 NOTE — ED NOTES
Placed bonnet on patient  No noted lice from my brief assessment  Per EMS, unsure if there is actual lice or is prophylactic since they had outbreak at either [facility] or [place] [address] (where she was just transferred from)        Anabella Gatica RN  12/23/18 5462

## 2018-12-24 ENCOUNTER — TELEPHONE (OUTPATIENT)
Dept: FAMILY MEDICINE CLINIC | Facility: CLINIC | Age: 83
End: 2018-12-24

## 2018-12-24 NOTE — TELEPHONE ENCOUNTER
Antwon Stephens from [facility] - Patient is currently ordered for Honey Thick liquids - She is now aspiration on all liquids which is giving her some increased anxiety - They would like an order for thin water only and thickened liquids for all other liquids      Please fax order to [phone]

## 2018-12-25 LAB
ATRIAL RATE: 58 BPM
P AXIS: 108 DEGREES
PR INTERVAL: 200 MS
QRS AXIS: 69 DEGREES
QRSD INTERVAL: 84 MS
QT INTERVAL: 412 MS
QTC INTERVAL: 404 MS
T WAVE AXIS: 81 DEGREES
VENTRICULAR RATE: 58 BPM

## 2018-12-25 PROCEDURE — 93010 ELECTROCARDIOGRAM REPORT: CPT | Performed by: INTERNAL MEDICINE

## 2018-12-26 ENCOUNTER — TELEPHONE (OUTPATIENT)
Dept: FAMILY MEDICINE CLINIC | Facility: CLINIC | Age: 83
End: 2018-12-26

## 2018-12-26 NOTE — TELEPHONE ENCOUNTER
[facility] called and asked for a discharge order for the 3rd dose of tylenol daily  States shes always sleeping for the 3rd dose and never wants to wake up       Asking for it to say to get tylenol twice daily    Fax- [phone]

## 2018-12-30 ENCOUNTER — APPOINTMENT (EMERGENCY)
Dept: CT IMAGING | Facility: HOSPITAL | Age: 83
End: 2018-12-30
Payer: MEDICARE

## 2018-12-30 ENCOUNTER — APPOINTMENT (EMERGENCY)
Dept: RADIOLOGY | Facility: HOSPITAL | Age: 83
End: 2018-12-30
Payer: MEDICARE

## 2018-12-30 ENCOUNTER — HOSPITAL ENCOUNTER (EMERGENCY)
Facility: HOSPITAL | Age: 83
Discharge: HOME | End: 2018-12-30
Attending: EMERGENCY MEDICINE | Admitting: EMERGENCY MEDICINE
Payer: MEDICARE

## 2018-12-30 VITALS
DIASTOLIC BLOOD PRESSURE: 64 MMHG | SYSTOLIC BLOOD PRESSURE: 142 MMHG | RESPIRATION RATE: 16 BRPM | HEART RATE: 63 BPM | TEMPERATURE: 97.6 F | OXYGEN SATURATION: 98 %
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
## 2018-12-30 DIAGNOSIS — S09.90XA INJURY OF HEAD, INITIAL ENCOUNTER: ICD-10-CM

## 2018-12-30 PROCEDURE — 71046 X-RAY EXAM CHEST 2 VIEWS: CPT

## 2018-12-30 PROCEDURE — 99284 EMERGENCY DEPT VISIT MOD MDM: CPT

## 2018-12-30 PROCEDURE — 70450 CT HEAD/BRAIN W/O DYE: CPT

## 2018-12-30 PROCEDURE — 72125 CT NECK SPINE W/O DYE: CPT

## 2018-12-31 ENCOUNTER — TRANSCRIBE ORDERS (OUTPATIENT)
Dept: FAMILY MEDICINE CLINIC | Facility: CLINIC | Age: 83
End: 2018-12-31

## 2018-12-31 ENCOUNTER — TELEPHONE (OUTPATIENT)
Dept: FAMILY MEDICINE CLINIC | Facility: CLINIC | Age: 83
End: 2018-12-31

## 2018-12-31 DIAGNOSIS — Z51.5 END OF LIFE CARE: Primary | ICD-10-CM

## 2018-12-31 NOTE — ED PROVIDER NOTES
History  Chief Complaint   Patient presents with    Fall     pt from [facility], had an unwitnessed fall  pt was found under bed per ems  reports back pain, hematoma on back of head  no thinners  79 YO female presents from NH after a fall  Pt has a Hx of dementia, she was found on the floor of her room, conscious with baseline mentation  Pt states pain all over which has been persistent chronically, denies new pains  Pt has been having difficulty with ambulation, mostly in a wheelchair right now, additionally family states she has had decreased PO intake though Pt states she has been eating everything given to her  Pt denies CP/SOB/F/C/N/V/D/C, no dysuria, burning on urination or blood in urine  History provided by:  Patient and relative   used: No    Fall   Mechanism of injury: fall    Injury location:  Head/neck  Head/neck injury location:  Scalp  Time since incident:  1 hour  Arrived directly from scene: yes    Fall:     Impact surface:  Hard floor    Point of impact:  Head  Suspicion of alcohol use: no    Suspicion of drug use: no    Prior to arrival data:     Blood loss:  None    Responsiveness at scene:  Alert    Orientation at scene:  Person    Amnesic to event: yes    Associated symptoms: no abdominal pain, no chest pain and no vomiting        Prior to Admission Medications   Prescriptions Last Dose Informant Patient Reported? Taking?    ALPRAZolam (XANAX) 0 25 mg tablet   Yes No   Sig: Take 1 tablet by mouth as needed     Artificial Saliva (BIOTENE MOISTURIZING MOUTH) SOLN   Yes No   Sig: Apply to the mouth or throat   D3-1000 1000 units tablet   No No   Sig: TAKE 1 TABLET ORALLY DAILY (SUPPLEMENT)   LOPERAMIDE HCL PO   Yes No   Sig: Take 2 mg by mouth as needed   MAPAP 325 MG tablet   No No   Sig: TAKE 2 TABLETS (650MG) ORALLY EVERY 8 HOURS FOR PAIN *NOT TO EXCEED 4GMS APAP/24HRS*   Menthol, Topical Analgesic, (BENGAY EX)   Yes No   Sig: Apply topically acetaminophen (TYLENOL) 500 mg tablet   Yes No   Sig: Take 500 mg by mouth every 6 (six) hours as needed for mild pain   aspirin 81 mg chewable tablet   No No   Sig: CHEW 1 TABLET AND SWALLOW ORALLY DAILY ON MONDAY, WEDNESDAY & FRIDAY (HEART)   clotrimazole-betamethasone (LOTRISONE) 1-0 05 % lotion   Yes No   Sig: Apply topically 2 (two) times a day   divalproex sodium (DEPAKOTE) 250 mg EC tablet   No No   Sig: TAKE 1 TABLET ORALLY AT BEDTIME (DO NOT CRUSH) (ALTERED MENTAL STATUS/ANXIETY DISORDER)   docusate sodium (COLACE) 100 mg capsule   No No   Sig: Take 1 capsule (100 mg total) by mouth 2 (two) times a day   donepezil (ARICEPT) 10 mg tablet   No No   Sig: TAKE 1 TABLET ORALLY DAILY (MEMORY LOSS)   escitalopram (LEXAPRO) 10 mg tablet   No No   Sig: TAKE 1 TABLET ORALLY DAILY (ANXIETY DISORDER)   levothyroxine 75 mcg tablet   No No   Sig: TAKE 1 TABLET ORALLY DAILY (THYROID)   metoprolol tartrate (LOPRESSOR) 25 mg tablet   No No   Sig: TAKE 1 TABLET ORALLY TWICE DAILY ( BLOOD PRESSURE )   mirtazapine (REMERON) 15 mg tablet   No No   Sig: Take 0 5 tablets (7 5 mg total) by mouth daily at bedtime   oxyCODONE (ROXICODONE) 5 mg immediate release tablet   No No   Sig: Take 0 5 tablets (2 5 mg total) by mouth every 4 (four) hours as needed for moderate pain Max Daily Amount: 15 mg   Patient taking differently: Take 2 5 mg by mouth every 6 (six) hours as needed for moderate pain     polyethylene glycol (GLYCOLAX) powder   Yes No   Sig: Take 17 g by mouth daily   saccharomyces boulardii (FLORASTOR) 250 mg capsule   No No   Sig: Take 1 capsule (250 mg total) by mouth 2 (two) times a day      Facility-Administered Medications: None       Past Medical History:   Diagnosis Date    Abnormal CT scan, neck     Last Assessed:  5/28/14    Altered mental status 11/22/2017    Anxiety     Basal cell adenocarcinoma     Benign essential hypertension     Last Assessed:  1/14/13    Closed 2-part intertrochanteric fracture of proximal end of right femur ([facility] [address] ) 10/30/2018    Coronary artery disease     Dementia 2006    Depression     Diverticulosis     Essential hypertension 11/22/2017    Hyperlipidemia     Hypertension     Hypothyroid 11/22/2017    Hypothyroid     Insomnia     Long term (current) use of antithrombotics/antiplatelets     Last Assessed:  9/23/13    Lytic bone lesion of right femur 10/30/2018    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 ([facility] [address] )     MI (myocardial infarction) ([facility] [address] )     Last Assessed:  6/11/15    Subdural hematoma, post-traumatic ([facility] [address] ) 2014    left sided, treated nonoperatively    Subdural hygroma 2014    s/p subdural hematoma    TIA (transient ischemic attack)     Unstable angina ([facility] [address] ) 2015    Vertigo        Past Surgical History:   Procedure Laterality Date    BREAST LUMPECTOMY Right     CATARACT EXTRACTION      CHOLECYSTECTOMY      COLONOSCOPY  2003    HYSTERECTOMY      Total abdominal with removal of both ovaries    [place] OPEN RX FEMUR FX+INTRAMED ANTOINETTE Right 10/31/2018    Procedure: INSERTION NAIL IM FEMUR ANTEGRADE (TROCHANTERIC); Surgeon: Sebastián Cam DO;  Location: [facility];  Service: Orthopedics       Family History   Problem Relation Age of Onset    Diabetes Mother     Lung cancer Mother     Colon cancer Father     Dementia Father     Prostate cancer Brother      I have reviewed and agree with the history as documented  Social History   Substance Use Topics    Smoking status: Never Smoker    Smokeless tobacco: Never Used    Alcohol use No      Comment: Per [employer]:  Social drinker        Review of Systems   Constitutional: Negative for chills, fatigue and fever  HENT: Negative for dental problem  Eyes: Negative for visual disturbance  Respiratory: Negative for shortness of breath  Cardiovascular: Negative for chest pain  Gastrointestinal: Negative for abdominal pain, diarrhea and vomiting     Genitourinary: Negative for dysuria and frequency  Musculoskeletal: Negative for arthralgias  Skin: Negative for rash  Neurological: Negative for dizziness, weakness and light-headedness  Psychiatric/Behavioral: Negative for agitation, behavioral problems and confusion  All other systems reviewed and are negative  Physical Exam  Physical Exam   Constitutional: She is oriented to person, place, and time  She appears well-developed and well-nourished  HENT:   Head: Normocephalic and atraumatic  Eyes: EOM are normal    Neck: Normal range of motion  Cardiovascular: Normal rate, regular rhythm and normal heart sounds  Pulmonary/Chest: Effort normal and breath sounds normal    Abdominal: Soft  There is no tenderness  Musculoskeletal: Normal range of motion  No tenderness to palpation over the neck, chest upper and lower extremities B/L  Neurological: She is alert and oriented to person, place, and time  Skin: Skin is warm and dry  Abrasion to the Right ear  Psychiatric: She has a normal mood and affect  Her behavior is normal  Thought content normal    Nursing note and vitals reviewed  Vital Signs  ED Triage Vitals [12/30/18 2038]   Temperature Pulse Respirations Blood Pressure SpO2   97 6 °F (36 4 °C) 66 18 161/89 92 %      Temp Source Heart Rate Source Patient Position - Orthostatic VS BP Location FiO2 (%)   Oral Monitor Lying Right arm --      Pain Score       --           Vitals:    12/30/18 2038 12/30/18 2330   BP: 161/89 142/64   Pulse: 66 63   Patient Position - Orthostatic VS: Lying Lying       Visual Acuity      ED Medications  Medications - No data to display    Diagnostic Studies  Results Reviewed     None                 XR chest 2 views   ED Interpretation by Candance Blow, MD (12/30 2308)   No Fx      Final Result by Sridhar Slade MD (12/31 0900)      No acute cardiopulmonary disease              Workstation performed: [id number]         CT head without contrast   Final Result by Naz Carlin MD (12/30 0417)      No acute intracranial abnormality  Microangiopathic changes  Workstation performed: [id number]         CT spine cervical wo contrast   Final Result by Viktoriya Diamond MD (12/30 2304)      No cervical spine fracture or traumatic malalignment  Degenerative changes             Workstation performed: [id number]                    Procedures  Procedures       Phone Contacts  ED Phone Contact    ED Course                               MDM  Number of Diagnoses or Management Options  Fall, initial encounter: new and does not require workup  Injury of head, initial encounter: new and does not require workup  Diagnosis management comments: 1  Fall - Pt with unwitnessed fall, normal mentation  She has no focal tenderness  Will CT head and c-spine, CXR  Amount and/or Complexity of Data Reviewed  Tests in the radiology section of CPT®: ordered and reviewed  Obtain history from someone other than the patient: yes  Independent visualization of images, tracings, or specimens: yes    Patient Progress  Patient progress: stable    CritCare Time    Disposition  Final diagnoses:   Fall, initial encounter   Injury of head, initial encounter     Time reflects when diagnosis was documented in both MDM as applicable and the Disposition within this note     Time User Action Codes Description Comment    12/30/2018 11:19 PM Mary STARR Add [H19  CNYC] Fall, initial encounter     12/30/2018 11:19 PM Mary STARR Add [S09 90XA] Injury of head, initial encounter       ED Disposition     ED Disposition Condition Comment    Discharge  Alma Delia Haro discharge to home/self care      Condition at discharge: Stable        Follow-up Information    None         Discharge Medication List as of 12/30/2018 11:19 PM      CONTINUE these medications which have NOT CHANGED    Details   !! acetaminophen (TYLENOL) 500 mg tablet Take 500 mg by mouth every 6 (six) hours as needed for mild pain, Historical Med ALPRAZolam (XANAX) 0 25 mg tablet Take 1 tablet by mouth as needed  , Starting Tue 12/12/2017, Historical Med      Artificial Saliva (BIOTENE MOISTURIZING MOUTH) SOLN Apply to the mouth or throat, Historical Med      aspirin 81 mg chewable tablet CHEW 1 TABLET AND SWALLOW ORALLY DAILY ON MONDAY, WEDNESDAY & FRIDAY (HEART), Normal      clotrimazole-betamethasone (LOTRISONE) 1-0 05 % lotion Apply topically 2 (two) times a day, Historical Med      D3-1000 1000 units tablet TAKE 1 TABLET ORALLY DAILY (SUPPLEMENT), Normal      divalproex sodium (DEPAKOTE) 250 mg EC tablet TAKE 1 TABLET ORALLY AT BEDTIME (DO NOT CRUSH) (ALTERED MENTAL STATUS/ANXIETY DISORDER), Normal      docusate sodium (COLACE) 100 mg capsule Take 1 capsule (100 mg total) by mouth 2 (two) times a day, Starting Thu 11/29/2018, Print      donepezil (ARICEPT) 10 mg tablet TAKE 1 TABLET ORALLY DAILY (MEMORY LOSS), Normal      escitalopram (LEXAPRO) 10 mg tablet TAKE 1 TABLET ORALLY DAILY (ANXIETY DISORDER), Normal      levothyroxine 75 mcg tablet TAKE 1 TABLET ORALLY DAILY (THYROID), Normal      LOPERAMIDE HCL PO Take 2 mg by mouth as needed, Historical Med      !!  MAPAP 325 MG tablet TAKE 2 TABLETS (650MG) ORALLY EVERY 8 HOURS FOR PAIN *NOT TO EXCEED 4GMS APAP/24HRS*, Normal      Menthol, Topical Analgesic, (BENGAY EX) Apply topically, Historical Med      metoprolol tartrate (LOPRESSOR) 25 mg tablet TAKE 1 TABLET ORALLY TWICE DAILY ( BLOOD PRESSURE ), Normal      mirtazapine (REMERON) 15 mg tablet Take 0 5 tablets (7 5 mg total) by mouth daily at bedtime, Starting Thu 12/20/2018, Normal      oxyCODONE (ROXICODONE) 5 mg immediate release tablet Take 0 5 tablets (2 5 mg total) by mouth every 4 (four) hours as needed for moderate pain Max Daily Amount: 15 mg, Starting Tue 11/6/2018, Print      polyethylene glycol (GLYCOLAX) powder Take 17 g by mouth daily, Historical Med      saccharomyces boulardii (FLORASTOR) 250 mg capsule Take 1 capsule (250 mg total) by mouth 2 (two) times a day, Starting Mon 12/17/2018, Normal       !! - Potential duplicate medications found  Please discuss with provider  No discharge procedures on file      ED Provider  Electronically Signed by           Shivani Dale MD  12/31/18 8280

## 2018-12-31 NOTE — ED NOTES
Patient transported to [facility], [address] [facility]  12/30/18 2312

## 2018-12-31 NOTE — DISCHARGE INSTRUCTIONS

## 2018-12-31 NOTE — ED NOTES
Attempted to call [facility], no answer  Pt leaving department with family       Elle Landis, RN  12/30/18 2496

## 2018-12-31 NOTE — TELEPHONE ENCOUNTER
Shyann Ortiz called and stated Whitley (pts daughter) is agreeable to hospice, they need an order faxed to them    Fax- [phone]

## 2019-01-02 ENCOUNTER — PATIENT OUTREACH (OUTPATIENT)
Dept: CASE MANAGEMENT | Facility: OTHER | Age: 84
End: 2019-01-02

## 2019-01-02 NOTE — PROGRESS NOTES
CM called [facility] [phone], patient is in building 2, spoke with nurse Shyann, gave brief description of outpt CM role with introduction  Shyann stated Hospice evaluation done today, patient is going onto Hospice, comfort care  Shyann reported patient is eating very little  CM gave Shyann CM's contact information,encouraged staff to call with any concerns or issues  CM made Shyann aware CM following until 2/3, CM will follow up with 1 more phone call

## 2019-01-04 DIAGNOSIS — E55.9 VITAMIN D DEFICIENCY: ICD-10-CM

## 2019-01-04 DIAGNOSIS — I10 ESSENTIAL HYPERTENSION: ICD-10-CM

## 2019-01-06 DIAGNOSIS — R52 PAIN: Primary | ICD-10-CM

## 2019-01-06 RX ORDER — ACETAMINOPHEN 325 MG/1
325 TABLET ORAL EVERY 8 HOURS PRN
Status: SHIPPED | OUTPATIENT
Start: 2019-01-06

## 2019-01-06 RX ORDER — ACETAMINOPHEN 325 MG/1
TABLET ORAL
Qty: 120 TABLET | Refills: 4 | OUTPATIENT
Start: 2019-01-06

## 2019-01-06 RX ORDER — ACETAMINOPHEN 325 MG/1
TABLET ORAL
Refills: 4 | OUTPATIENT
Start: 2019-01-06

## 2019-01-11 DIAGNOSIS — Z51.5 HOSPICE CARE: Primary | ICD-10-CM

## 2019-01-11 RX ORDER — OXYCODONE HYDROCHLORIDE 5 MG/1
2.5 TABLET ORAL EVERY 4 HOURS PRN
Qty: 12 TABLET | Refills: 0 | Status: SHIPPED | OUTPATIENT
Start: 2019-01-11 | End: 2019-01-11 | Stop reason: SDUPTHER

## 2019-01-11 RX ORDER — OXYCODONE HYDROCHLORIDE 5 MG/1
2.5 TABLET ORAL EVERY 4 HOURS PRN
Qty: 12 TABLET | Refills: 0 | Status: SHIPPED | OUTPATIENT
Start: 2019-01-11 | End: 2019-01-23 | Stop reason: SDUPTHER

## 2019-01-11 NOTE — TELEPHONE ENCOUNTER
Mio Aguirre needs the patients prescription called into their pharmacy, [facility], in order for it to be approved  They cannot accept faxes for controlled Rx's    Pharmacy #:[phone]

## 2019-01-16 DIAGNOSIS — F41.9 ANXIETY: Primary | ICD-10-CM

## 2019-01-16 DIAGNOSIS — E55.9 VITAMIN D DEFICIENCY: ICD-10-CM

## 2019-01-16 RX ORDER — ACETAMINOPHEN 325 MG/1
TABLET ORAL
Qty: 60 TABLET | Refills: 4 | Status: SHIPPED | OUTPATIENT
Start: 2019-01-16 | End: 2019-04-30 | Stop reason: SDUPTHER

## 2019-01-16 RX ORDER — CITALOPRAM 20 MG/1
TABLET ORAL
Qty: 15 TABLET | Refills: 4 | Status: SHIPPED | OUTPATIENT
Start: 2019-01-16 | End: 2019-05-20 | Stop reason: SDUPTHER

## 2019-01-17 ENCOUNTER — TELEPHONE (OUTPATIENT)
Dept: FAMILY MEDICINE CLINIC | Facility: CLINIC | Age: 84
End: 2019-01-17

## 2019-01-17 ENCOUNTER — TELEPHONE (OUTPATIENT)
Dept: OBGYN CLINIC | Facility: CLINIC | Age: 84
End: 2019-01-17

## 2019-01-17 ENCOUNTER — TELEPHONE (OUTPATIENT)
Dept: OBGYN CLINIC | Facility: MEDICAL CENTER | Age: 84
End: 2019-01-17

## 2019-01-17 NOTE — TELEPHONE ENCOUNTER
I am confused as to whether have 3 different directions for the Oxycodone  I signed sheet yesterday for that and I signed sheet this morning regarding the skin tear  Can recall them and see what way they want the oxycodone to be written per hospice    And the skin tear instruction should have been faxed over earlier

## 2019-01-17 NOTE — TELEPHONE ENCOUNTER
Called to reschedule an appointment  Patient is currently in hospice care  No future appointments were scheduled

## 2019-01-17 NOTE — TELEPHONE ENCOUNTER
[facility] called asking how exactly should her Oxycodone be written? They have 3 different sigs  1 BID  1 PRN every 4 hours for pain  1 PRN every 6 hours for pain    [phone] ask for nursing  She also has a skin tear on her hand 2x2cm  They did send over a fax

## 2019-01-21 DIAGNOSIS — K59.00 CONSTIPATION, UNSPECIFIED CONSTIPATION TYPE: Primary | ICD-10-CM

## 2019-01-21 RX ORDER — POLYETHYLENE GLYCOL 3350 17 G/17G
17 POWDER, FOR SOLUTION ORAL DAILY
Qty: 17 G | Refills: 0 | Status: SHIPPED | OUTPATIENT
Start: 2019-01-21 | End: 2019-01-28 | Stop reason: SDUPTHER

## 2019-01-23 DIAGNOSIS — Z51.5 HOSPICE CARE: ICD-10-CM

## 2019-01-23 RX ORDER — OXYCODONE HYDROCHLORIDE 5 MG/1
2.5 TABLET ORAL EVERY 4 HOURS PRN
Qty: 12 TABLET | Refills: 0 | Status: SHIPPED | OUTPATIENT
Start: 2019-01-23 | End: 2019-02-12 | Stop reason: SDUPTHER

## 2019-01-23 NOTE — TELEPHONE ENCOUNTER
In hospice, they called stating pt needs Oxycodone  Please authorize       PMED:  1/12/19  #12  4 days

## 2019-01-28 DIAGNOSIS — K59.00 CONSTIPATION, UNSPECIFIED CONSTIPATION TYPE: ICD-10-CM

## 2019-01-28 DIAGNOSIS — K59.00 CONSTIPATION: ICD-10-CM

## 2019-01-28 RX ORDER — POLYETHYLENE GLYCOL 3350 17 G/17G
POWDER, FOR SOLUTION ORAL
Qty: 238 G | Refills: 4 | Status: SHIPPED | OUTPATIENT
Start: 2019-01-28 | End: 2019-06-06 | Stop reason: SDUPTHER

## 2019-01-28 RX ORDER — DOCUSATE SODIUM 100 MG
CAPSULE ORAL
Qty: 30 CAPSULE | Refills: 4 | Status: SHIPPED | OUTPATIENT
Start: 2019-01-28

## 2019-02-05 NOTE — PROGRESS NOTES
2/5/19-spoke to [address] confirming pt on hospice and there are no new changes at this time  Advised pts bundle episode ended on 2/319  Case clsoed

## 2019-02-07 ENCOUNTER — TELEPHONE (OUTPATIENT)
Dept: FAMILY MEDICINE CLINIC | Facility: CLINIC | Age: 84
End: 2019-02-07

## 2019-02-07 DIAGNOSIS — R39.9 UTI SYMPTOMS: Primary | ICD-10-CM

## 2019-02-07 RX ORDER — CEPHALEXIN 500 MG/1
500 CAPSULE ORAL EVERY 8 HOURS SCHEDULED
Qty: 21 CAPSULE | Refills: 0 | Status: SHIPPED | OUTPATIENT
Start: 2019-02-07 | End: 2019-02-14

## 2019-02-07 NOTE — TELEPHONE ENCOUNTER
Gerardo Colin was assessed today by hospice, they did recommend an antibiotic for a UTI to be sent to [facility]  Also wanted to make us aware that patient has been refusing care from both the hospice aide and [facility] staff and has been combative, she was recently found playing with her feces on the commode  She will be sending a fax regarding all of this as well

## 2019-02-10 ENCOUNTER — TELEPHONE (OUTPATIENT)
Dept: OTHER | Facility: OTHER | Age: 84
End: 2019-02-10

## 2019-02-10 ENCOUNTER — APPOINTMENT (EMERGENCY)
Dept: CT IMAGING | Facility: HOSPITAL | Age: 84
End: 2019-02-10
Payer: MEDICARE

## 2019-02-10 ENCOUNTER — HOSPITAL ENCOUNTER (EMERGENCY)
Facility: HOSPITAL | Age: 84
Discharge: HOME | End: 2019-02-10
Attending: EMERGENCY MEDICINE
Payer: MEDICARE

## 2019-02-10 ENCOUNTER — APPOINTMENT (EMERGENCY)
Dept: RADIOLOGY | Facility: HOSPITAL | Age: 84
End: 2019-02-10
Payer: MEDICARE

## 2019-02-10 VITALS
RESPIRATION RATE: 20 BRPM | TEMPERATURE: 98 F | HEART RATE: 68 BPM | OXYGEN SATURATION: 96 % | SYSTOLIC BLOOD PRESSURE: 169 MMHG | DIASTOLIC BLOOD PRESSURE: 72 MMHG | WEIGHT: 95 LBS | BODY MASS INDEX: 17.95 KG/M2
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
## 2019-02-10 DIAGNOSIS — S42.001A RIGHT CLAVICLE FRACTURE: ICD-10-CM

## 2019-02-10 LAB
ALBUMIN SERPL BCP-MCNC: 3.3 G/DL (ref 3.5–5)
ALP SERPL-CCNC: 111 U/L (ref 46–116)
ALT SERPL W P-5'-P-CCNC: 17 U/L (ref 12–78)
ANION GAP SERPL CALCULATED.3IONS-SCNC: 6 MMOL/L (ref 4–13)
APTT PPP: 35 SECONDS (ref 26–38)
AST SERPL W P-5'-P-CCNC: 20 U/L (ref 5–45)
ATRIAL RATE: 136 BPM
BASOPHILS # BLD AUTO: 0.04 THOUSANDS/ΜL (ref 0–0.1)
BASOPHILS NFR BLD AUTO: 0 % (ref 0–1)
BILIRUB SERPL-MCNC: 0.58 MG/DL (ref 0.2–1)
BUN SERPL-MCNC: 20 MG/DL (ref 5–25)
CALCIUM SERPL-MCNC: 9.2 MG/DL (ref 8.3–10.1)
CHLORIDE SERPL-SCNC: 102 MMOL/L (ref 100–108)
CO2 SERPL-SCNC: 31 MMOL/L (ref 21–32)
CREAT SERPL-MCNC: 0.55 MG/DL (ref 0.6–1.3)
EOSINOPHIL # BLD AUTO: 0.18 THOUSAND/ΜL (ref 0–0.61)
EOSINOPHIL NFR BLD AUTO: 2 % (ref 0–6)
ERYTHROCYTE [DISTWIDTH] IN BLOOD BY AUTOMATED COUNT: 13.2 % (ref 11.6–15.1)
GFR SERPL CREATININE-BSD FRML MDRD: 80 ML/MIN/1.73SQ M
GLUCOSE SERPL-MCNC: 104 MG/DL (ref 65–140)
HCT VFR BLD AUTO: 36 % (ref 34.8–46.1)
HGB BLD-MCNC: 11.4 G/DL (ref 11.5–15.4)
IMM GRANULOCYTES # BLD AUTO: 0.05 THOUSAND/UL (ref 0–0.2)
IMM GRANULOCYTES NFR BLD AUTO: 0 % (ref 0–2)
INR PPP: 1.01 (ref 0.86–1.17)
LYMPHOCYTES # BLD AUTO: 0.98 THOUSANDS/ΜL (ref 0.6–4.47)
LYMPHOCYTES NFR BLD AUTO: 8 % (ref 14–44)
MCH RBC QN AUTO: 33.6 PG (ref 26.8–34.3)
MCHC RBC AUTO-ENTMCNC: 31.7 G/DL (ref 31.4–37.4)
MCV RBC AUTO: 106 FL (ref 82–98)
MONOCYTES # BLD AUTO: 0.6 THOUSAND/ΜL (ref 0.17–1.22)
MONOCYTES NFR BLD AUTO: 5 % (ref 4–12)
NEUTROPHILS # BLD AUTO: 10.12 THOUSANDS/ΜL (ref 1.85–7.62)
NEUTS SEG NFR BLD AUTO: 85 % (ref 43–75)
NRBC BLD AUTO-RTO: 0 /100 WBCS
PLATELET # BLD AUTO: 240 THOUSANDS/UL (ref 149–390)
PMV BLD AUTO: 8.9 FL (ref 8.9–12.7)
POTASSIUM SERPL-SCNC: 3.8 MMOL/L (ref 3.5–5.3)
PROT SERPL-MCNC: 7.3 G/DL (ref 6.4–8.2)
PROTHROMBIN TIME: 13.4 SECONDS (ref 11.8–14.2)
QRS AXIS: 51 DEGREES
QRSD INTERVAL: 90 MS
QT INTERVAL: 394 MS
QTC INTERVAL: 409 MS
RBC # BLD AUTO: 3.39 MILLION/UL (ref 3.81–5.12)
SODIUM SERPL-SCNC: 139 MMOL/L (ref 136–145)
T WAVE AXIS: 89 DEGREES
TROPONIN I SERPL-MCNC: <0.02 NG/ML
VENTRICULAR RATE: 65 BPM
WBC # BLD AUTO: 11.97 THOUSAND/UL (ref 4.31–10.16)

## 2019-02-10 PROCEDURE — 84484 ASSAY OF TROPONIN QUANT: CPT | Performed by: PHYSICIAN ASSISTANT

## 2019-02-10 PROCEDURE — 96361 HYDRATE IV INFUSION ADD-ON: CPT

## 2019-02-10 PROCEDURE — 71046 X-RAY EXAM CHEST 2 VIEWS: CPT

## 2019-02-10 PROCEDURE — 93005 ELECTROCARDIOGRAM TRACING: CPT

## 2019-02-10 PROCEDURE — 72125 CT NECK SPINE W/O DYE: CPT

## 2019-02-10 PROCEDURE — 85025 COMPLETE CBC W/AUTO DIFF WBC: CPT | Performed by: PHYSICIAN ASSISTANT

## 2019-02-10 PROCEDURE — 80053 COMPREHEN METABOLIC PANEL: CPT | Performed by: PHYSICIAN ASSISTANT

## 2019-02-10 PROCEDURE — 93010 ELECTROCARDIOGRAM REPORT: CPT | Performed by: INTERNAL MEDICINE

## 2019-02-10 PROCEDURE — 99285 EMERGENCY DEPT VISIT HI MDM: CPT

## 2019-02-10 PROCEDURE — 85610 PROTHROMBIN TIME: CPT | Performed by: PHYSICIAN ASSISTANT

## 2019-02-10 PROCEDURE — 70450 CT HEAD/BRAIN W/O DYE: CPT

## 2019-02-10 PROCEDURE — 73030 X-RAY EXAM OF SHOULDER: CPT

## 2019-02-10 PROCEDURE — 96360 HYDRATION IV INFUSION INIT: CPT

## 2019-02-10 PROCEDURE — 73000 X-RAY EXAM OF COLLAR BONE: CPT

## 2019-02-10 PROCEDURE — 36415 COLL VENOUS BLD VENIPUNCTURE: CPT | Performed by: PHYSICIAN ASSISTANT

## 2019-02-10 PROCEDURE — 85730 THROMBOPLASTIN TIME PARTIAL: CPT | Performed by: PHYSICIAN ASSISTANT

## 2019-02-10 RX ORDER — ACETAMINOPHEN 325 MG/1
650 TABLET ORAL ONCE
Status: COMPLETED | OUTPATIENT
Start: 2019-02-10 | End: 2019-02-10

## 2019-02-10 RX ORDER — LIDOCAINE 50 MG/G
1 PATCH TOPICAL ONCE
Status: DISCONTINUED | OUTPATIENT
Start: 2019-02-10 | End: 2019-02-10 | Stop reason: HOSPADM

## 2019-02-10 RX ADMIN — SODIUM CHLORIDE 500 ML: 0.9 INJECTION, SOLUTION INTRAVENOUS at 10:50

## 2019-02-10 RX ADMIN — LIDOCAINE 1 PATCH: 50 PATCH TOPICAL at 13:26

## 2019-02-10 RX ADMIN — ACETAMINOPHEN 650 MG: 325 TABLET, FILM COATED ORAL at 13:26

## 2019-02-10 NOTE — ED NOTES
Pt's daughter, Hector Liu, not wanting patient to have straight cath done because she is already being treated for a UTI at this time  Arely Head PAC aware       Brandie Araiza, RN  02/10/19 4738

## 2019-02-10 NOTE — ED NOTES
Patient transported to [address] [place] [address], [address] [place] [address]  02/10/19 1015

## 2019-02-10 NOTE — ED PROVIDER NOTES
History  Chief Complaint   Patient presents with    Fall     Pt had unwitnessed fall this morning  Usually walks with walker, but walker was found in the bathroom away from her  Pt is c/o neck pain and R shoulder pain  Pt is on blood thinners  Hx  of anxiety, dementia  41-year-old female with PMhx of depression, anxiety, basal cell carcinoma, HTN, CAD, dementia, diverticulosis, HLD, hypothyroid, insomnia, MI, subdural hematoma, TIA, unstable angina and vertigo presents to the ER via EMS for fall that occurred this morning  Patient experienced an unwitnessed fall this morning  Per the EMS note, she usually walks a walker, but the walker was found away from the bathroom, where the patient was found lying down  Unknown if there was any loss of consciousness involved  Currently in the emergency department she does complain of neck pain and right shoulder pain  Patient does take aspirin daily  History is limited secondary to patient's dementia  History provided by:  EMS personnel  History limited by:  Dementia   used: No    Fall       Prior to Admission Medications   Prescriptions Last Dose Informant Patient Reported? Taking?    ALPRAZolam (XANAX) 0 25 mg tablet   Yes Yes   Sig: Take 1 tablet by mouth as needed     Artificial Saliva (BIOTENE MOISTURIZING MOUTH) SOLN   Yes Yes   Sig: Apply to the mouth or throat   MAPAP 325 MG tablet   No Yes   Sig: TAKE 2 TABLETS (650MG) ORALLY TWICE DAILY (PAIN) *NOT TO EXCEED 4GMS APAP/24HRS* *HOSPICE 15 DAY-NOT ON AUTO* *RE-ORDER*   Menthol, Topical Analgesic, (BENGAY EX)   Yes Yes   Sig: Apply topically   STOOL SOFTENER 100 MG capsule   No Yes   Sig: TAKE 1 CAPSULE ORALLY DAILY AS NEEDED FOR CONSTIPATION (DO NOT CRUSH) *RE-ORDER*   aspirin 81 mg chewable tablet   No Yes   Sig: CHEW 1 TABLET AND SWALLOW ORALLY DAILY ON MONDAY, WEDNESDAY & FRIDAY (HEART)   cephalexin (KEFLEX) 500 mg capsule   No Yes   Sig: Take 1 capsule (500 mg total) by mouth every 8 (eight) hours for 7 days   citalopram (CeleXA) 20 mg tablet   No Yes   Sig: TAKE 1 TABLET ORALLY DAILY (ANXIETY DISORDER) *HOSPICE 15 DAY-NOT ON AUTO* *RE-ORDER*   clotrimazole-betamethasone (LOTRISONE) 1-0 05 % lotion   Yes Yes   Sig: Apply topically 2 (two) times a day   divalproex sodium (DEPAKOTE) 250 mg EC tablet   No Yes   Sig: TAKE 1 TABLET ORALLY AT BEDTIME (DO NOT CRUSH) (ALTERED MENTAL STATUS/ANXIETY DISORDER)   levothyroxine 75 mcg tablet   No Yes   Sig: TAKE 1 TABLET ORALLY DAILY (THYROID)   mirtazapine (REMERON) 15 mg tablet   No Yes   Sig: Take 0 5 tablets (7 5 mg total) by mouth daily at bedtime   oxyCODONE (ROXICODONE) 5 mg immediate release tablet   No Yes   Sig: Take 0 5 tablets (2 5 mg total) by mouth every 4 (four) hours as needed for moderate pain Earliest Fill Date: 1/23/19 Max Daily Amount: 15 mg   polyethylene glycol (GLYCOLAX) powder   No Yes   Sig: MIX 17GM WITH 8OZ WATER & DRINK ORALLY DAILY (CONSTIPATION) *RE-ORDER*      Facility-Administered Medications Last Administration Doses Remaining   acetaminophen (TYLENOL) tablet 325 mg None recorded           Past Medical History:   Diagnosis Date    Abnormal CT scan, neck     Last Assessed:  5/28/14    Altered mental status 11/22/2017    Anxiety     Basal cell adenocarcinoma     Benign essential hypertension     Last Assessed:  1/14/13    Closed 2-part intertrochanteric fracture of proximal end [facility] right [facility] 10/30/2018    Coronary artery disease     Dementia 2006    Depression     Diverticulosis     Essential hypertension 11/22/2017    Hyperlipidemia     Hypertension     Hypothyroid 11/22/2017    Hypothyroid     Insomnia     Long term (current) use of antithrombotics/antiplatelets     Last Assessed:  9/23/13    Lytic bone lesion of right femur 10/30/2018    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 ([facility] )     MI (myocardial infarction) ([facility] )     Last Assessed:  6/11/15    Subdural hematoma, post-traumatic [facility]) 2014    left sided, treated nonoperatively    Subdural hygroma 2014    s/p subdural hematoma    TIA (transient ischemic attack)     Unstable angina ([address] ) 2015    Vertigo        Past Surgical History:   Procedure Laterality Date    BREAST LUMPECTOMY Right     CATARACT EXTRACTION      CHOLECYSTECTOMY      COLONOSCOPY  2003    HYSTERECTOMY      Total abdominal with removal of both ovaries    UT OPEN RX FEMUR FX+INTRAMED ANTOINETTE Right 10/31/2018    Procedure: INSERTION NAIL IM FEMUR ANTEGRADE (TROCHANTERIC); Surgeon: Anisa Benitez DO;  Location: [facility];  Service: Orthopedics       Family History   Problem Relation Age of Onset    Diabetes Mother     Lung cancer Mother     Colon cancer Father     Dementia Father     Prostate cancer Brother      I have reviewed and agree with the history as documented  Social History     Tobacco Use    Smoking status: Never Smoker    Smokeless tobacco: Never Used   Substance Use Topics    Alcohol use: No     Comment: Per [employer]:  Social drinker    Drug use: No        Review of Systems   Unable to perform ROS: Dementia   Musculoskeletal: Positive for arthralgias and joint swelling  Skin: Positive for color change  Physical Exam  Physical Exam   Constitutional: She appears well-developed and well-nourished  No distress  HENT:   Head: Normocephalic  There is an old healing bruise to the left frontal scalp that is nontender to palpation  No facial bone tenderness to palpation  Dry mucous membranes  Eyes: Pupils are equal, round, and reactive to light  Conjunctivae and EOM are normal    Neck: Normal range of motion  Neck supple  Minimal pain with palpation to the paraspinous cervical spine  No bony tenderness on palpation  Cardiovascular: Normal rate, regular rhythm and intact distal pulses  Pulmonary/Chest: Effort normal and breath sounds normal  She has no wheezes  She has no rales  She exhibits no tenderness  Abdominal: Soft  Bowel sounds are normal  She exhibits no distension  There is no tenderness  There is no rebound and no guarding  Musculoskeletal: She exhibits edema and tenderness (Decreased range of motion of the right shoulder secondary to pain  There is also ecchymosis and edema to the right shoulder  Mild pain with palpation to the right clavicle  )  No pelvic instability  Neurological: She is alert  No cranial nerve deficit or sensory deficit  She exhibits normal muscle tone  Coordination normal    Oriented x2  Speaks full sentences but confused  Negative pronator drift  Skin: Skin is warm and dry  Capillary refill takes less than 2 seconds  She is not diaphoretic  Psychiatric: She has a normal mood and affect  Her behavior is normal    Nursing note and vitals reviewed        Vital Signs  ED Triage Vitals [02/10/19 0940]   Temperature Pulse Respirations Blood Pressure SpO2   98 °F (36 7 °C) 64 20 163/71 98 %      Temp Source Heart Rate Source Patient Position - Orthostatic VS BP Location FiO2 (%)   Temporal Monitor Lying Right arm --      Pain Score       Worst Possible Pain           Vitals:    02/10/19 0940 02/10/19 1142 02/10/19 1245   BP: 163/71 127/60 169/72   Pulse: 64 66 68   Patient Position - Orthostatic VS: Lying Lying Lying       Visual Acuity  Visual Acuity      Most Recent Value   L Pupil Size (mm)  3   R Pupil Size (mm)  3          ED Medications  Medications   sodium chloride 0 9 % bolus 500 mL (0 mL Intravenous Stopped 2/10/19 1311)   acetaminophen (TYLENOL) tablet 650 mg (650 mg Oral Given 2/10/19 1326)       Diagnostic Studies  Results Reviewed     Procedure Component Value Units Date/Time    Troponin I [[id number]]  (Normal) Collected:  02/10/19 1046    Lab Status:  Final result Specimen:  Blood from Arm, Left Updated:  02/10/19 1114     Troponin I <0 02 ng/mL     Comprehensive metabolic panel [[id number]]  (Abnormal) Collected:  02/10/19 1046    Lab Status:  Final result Specimen:  Blood from Arm, Left Updated:  02/10/19 1111     Sodium 139 mmol/L      Potassium 3 8 mmol/L      Chloride 102 mmol/L      CO2 31 mmol/L      ANION GAP 6 mmol/L      BUN 20 mg/dL      Creatinine 0 55 mg/dL      Glucose 104 mg/dL      Calcium 9 2 mg/dL      AST 20 U/L      ALT 17 U/L      Alkaline Phosphatase 111 U/L      Total Protein 7 3 g/dL      Albumin 3 3 g/dL      Total Bilirubin 0 58 mg/dL      eGFR 80 ml/min/1 73sq m     Narrative:       National Kidney Disease Education Program recommendations are as follows:  GFR calculation is accurate only with a steady state creatinine  Chronic Kidney disease less than 60 ml/min/1 73 sq  meters  Kidney failure less than 15 ml/min/1 73 sq  meters      APTT [[id number]]  (Normal) Collected:  02/10/19 1046    Lab Status:  Final result Specimen:  Blood from Arm, Left Updated:  02/10/19 1105     PTT 35 seconds     Protime-INR [[id number]]  (Normal) Collected:  02/10/19 1046    Lab Status:  Final result Specimen:  Blood from Arm, Left Updated:  02/10/19 1105     Protime 13 4 seconds      INR 1 01    CBC and differential [[id number]]  (Abnormal) Collected:  02/10/19 1046    Lab Status:  Final result Specimen:  Blood from Arm, Left Updated:  02/10/19 1055     WBC 11 97 Thousand/uL      RBC 3 39 Million/uL      Hemoglobin 11 4 g/dL      Hematocrit 36 0 %       fL      MCH 33 6 pg      MCHC 31 7 g/dL      RDW 13 2 %      MPV 8 9 fL      Platelets 861 Thousands/uL      nRBC 0 /100 WBCs      Neutrophils Relative 85 %      Immat GRANS % 0 %      Lymphocytes Relative 8 %      Monocytes Relative 5 %      Eosinophils Relative 2 %      Basophils Relative 0 %      Neutrophils Absolute 10 12 Thousands/µL      Immature Grans Absolute 0 05 Thousand/uL      Lymphocytes Absolute 0 98 Thousands/µL      Monocytes Absolute 0 60 Thousand/µL      Eosinophils Absolute 0 18 Thousand/µL      Basophils Absolute 0 04 Thousands/µL                  XR chest 2 views   ED Interpretation by Lino Smith PA-C (02/10 1151)   No acute cardiopulmonary disease  Final Result by Marybel Shen MD (02/10 1228)      No acute cardiopulmonary disease  Distal right clavicular fracture better visualized on dedicated radiographs  Workstation performed: [id number]         XR shoulder 2+ views RIGHT   ED Interpretation by Jordy Story PA-C (02/10 1151)   Possible clavicular and humeral head fracture      Final Result by Marybel Shen MD (02/10 1226)      Transverse fracture through the distal right clavicle, without significant displacement  Workstation performed: [id number]         XR clavicle RIGHT   ED Interpretation by Jordy Story PA-C (02/10 1151)   Possible clavicular  fracture      Final Result by Marybel Shen MD (02/10 1227)      Transverse fracture through the distal right clavicle, without significant displacement  Workstation performed: [id number]         CT head without contrast   Final Result by Kwame Hernandes MD (02/10 1054)      Stable exam   No acute intracranial abnormality  Workstation performed: [id number]         CT spine cervical without contrast   Final Result by Kwame Hernandes MD (02/10 1100)      No cervical spine fracture or traumatic malalignment                     Workstation performed: [id number]                    Procedures  ECG 12 Lead Documentation  Date/Time: 2/10/2019 10:37 AM  Performed by: Jordy Story PA-C  Authorized by: Rachel Pozo MD     Indications / Diagnosis:  Fall  ECG reviewed by me, the ED Provider: no    Patient location:  ED  Previous ECG:     Previous ECG:  Compared to current    Comparison ECG info:  12/30/2018    Similarity:  No change  Interpretation:     Interpretation: normal    Rate:     ECG rate:  65    ECG rate assessment: normal    Rhythm:     Rhythm: sinus rhythm    Ectopy:     Ectopy: none    QRS:     QRS axis:  Normal    QRS intervals:  Normal  Conduction:     Conduction: normal    ST segments:     ST segments: Normal  T waves:     T waves: normal    Comments: On my interpretation  QTc: 409  Phone Contacts  ED Phone Contact    ED Course  ED Course as of Feb 10 1600   Sun Feb 10, 2019   1146 Patient's daughter called and does not want the patient to have straight cath performed  Patient is already being treated for UTI with abx       [address] [employer] [facility] Spoke with Dr Bertin Srinivasan (hospice doctor) who has been updated on the patient's condition  Discussed leukocytosis with her and she does not wish to have anything done for this  States that she will take care of the patient when she returns to the facility  1209 Pending final radiology reads for XR shoulder and clavicle  HEART Risk Score      Most Recent Value   History  0 Filed at: 02/10/2019 1600   ECG  0 Filed at: 02/10/2019 1600   Age  2 Filed at: 02/10/2019 1600   Risk Factors  2 Filed at: 02/10/2019 1600   Troponin  0 Filed at: 02/10/2019 1600   Heart Score Risk Calculator   History  0 Filed at: 02/10/2019 1600   ECG  0 Filed at: 02/10/2019 1600   Age  2 Filed at: 02/10/2019 1600   Risk Factors  2 Filed at: 02/10/2019 1600   Troponin  0 Filed at: 02/10/2019 1600   HEART Score  4 Filed at: 02/10/2019 1600   HEART Score  4 Filed at: 02/10/2019 1600                            MDM  Number of Diagnoses or Management Options  Fall, initial encounter:   Right clavicle fracture:   Diagnosis management comments: Differential Diagnosis includes but is not limited to:  Subdural hemorrhage, intracranial hemorrhage, head and neck fracture dislocation, contusion, abrasion, ACS, electrolyte abnormality, dehydration, infection, shoulder/clavicle fracture or dislocation or hematoma  Labs show mild leukocytosis  Otherwise generally unremarkable  Trop negative  EKG NSR with no ectopy or ST changes  Chest x-ray shows no acute cardiopulmonary disease  X-ray of the right shoulder and clavicle shows distal right clavicle fracture     CT head shows no acute intracranial findings  CT cervical spine no acute fracture or dislocation  Given APAP and lidocaine patch in the ER  Patient discussed with her hospice physician (Dr Nidia Harrell) who has been updated on her work up  Also discussed with family members at bedside  No urine obtained as patient is already on keflex  Leukocytosis is likely due to UTI which she is being treated for  D/c back to facility  Amount and/or Complexity of Data Reviewed  Clinical lab tests: reviewed and ordered  Tests in the radiology section of CPT®: reviewed and ordered  Independent visualization of images, tracings, or specimens: yes        Disposition  Final diagnoses:   Fall, initial encounter   Right clavicle fracture     Time reflects when diagnosis was documented in both MDM as applicable and the Disposition within this note     Time User Action Codes Description Comment    2/10/2019 12:50 PM Angela Escalante Add [W19  QALU] Fall, initial encounter     2/10/2019 12:50 PM Angela Escalante Add [S42 001A] Right clavicle fracture       ED Disposition     ED Disposition Condition Date/Time Comment    Discharge Stable Sun Feb 10, 2019 12:50 PM [employer] discharge to home/self care              Follow-up Information    None         Discharge Medication List as of 2/10/2019 12:51 PM      CONTINUE these medications which have NOT CHANGED    Details   ALPRAZolam (XANAX) 0 25 mg tablet Take 1 tablet by mouth as needed  , Starting Tue 12/12/2017, Historical Med      Artificial Saliva (BIOTENE MOISTURIZING MOUTH) SOLN Apply to the mouth or throat, Historical Med      aspirin 81 mg chewable tablet CHEW 1 TABLET AND SWALLOW ORALLY DAILY ON MONDAY, WEDNESDAY & FRIDAY (HEART), Normal      cephalexin (KEFLEX) 500 mg capsule Take 1 capsule (500 mg total) by mouth every 8 (eight) hours for 7 days, Starting Thu 2/7/2019, Until Thu 2/14/2019, Normal      citalopram (CeleXA) 20 mg tablet TAKE 1 TABLET ORALLY DAILY (ANXIETY DISORDER) *HOSPICE 15 DAY-NOT ON AUTO* *RE-ORDER*, Normal clotrimazole-betamethasone (LOTRISONE) 1-0 05 % lotion Apply topically 2 (two) times a day, Historical Med      divalproex sodium (DEPAKOTE) 250 mg EC tablet TAKE 1 TABLET ORALLY AT BEDTIME (DO NOT CRUSH) (ALTERED MENTAL STATUS/ANXIETY DISORDER), Normal      levothyroxine 75 mcg tablet TAKE 1 TABLET ORALLY DAILY (THYROID), Normal      MAPAP 325 MG tablet TAKE 2 TABLETS (650MG) ORALLY TWICE DAILY (PAIN) *NOT TO EXCEED 4GMS APAP/24HRS* *HOSPICE 15 DAY-NOT ON AUTO* *RE-ORDER*, Normal      Menthol, Topical Analgesic, (BENGAY EX) Apply topically, Historical Med      mirtazapine (REMERON) 15 mg tablet Take 0 5 tablets (7 5 mg total) by mouth daily at bedtime, Starting u 12/20/2018, Normal      oxyCODONE (ROXICODONE) 5 mg immediate release tablet Take 0 5 tablets (2 5 mg total) by mouth every 4 (four) hours as needed for moderate pain Earliest Fill Date: 1/23/19 Max Daily Amount: 15 mg, Starting Wed 1/23/2019, Print      polyethylene glycol (GLYCOLAX) powder MIX 17GM WITH 8OZ WATER & DRINK ORALLY DAILY (CONSTIPATION) *RE-ORDER*, Normal      STOOL SOFTENER 100 MG capsule TAKE 1 CAPSULE ORALLY DAILY AS NEEDED FOR CONSTIPATION (DO NOT CRUSH) *RE-ORDER*, Normal           No discharge procedures on file      ED Provider  Electronically Signed by           Jennyfer Beard PA-C  02/10/19 7578

## 2019-02-10 NOTE — TELEPHONE ENCOUNTER
Connected Dr Leah Leigh to Romie Brooks from [facility] for patient pain medication   Connected their call at 16:57

## 2019-02-10 NOTE — ED NOTES
Gely Levine called requesting update  Advised that patient is to be discharged        Judeen Reason, RN  02/10/19 7950

## 2019-02-11 ENCOUNTER — TELEPHONE (OUTPATIENT)
Dept: FAMILY MEDICINE CLINIC | Facility: CLINIC | Age: 84
End: 2019-02-11

## 2019-02-11 NOTE — TELEPHONE ENCOUNTER
Donal Seaman called telling us to expect a faxed request/order  Patient was evaluated again today by the hospice nurse  Hospice nurse recommended changing oxycodone dose to 5 mg q6h    Please be aware, thanks

## 2019-02-11 NOTE — TELEPHONE ENCOUNTER
Patient was at the hospital over the weekend and they changed her Tylenol script but did not sign it  [facility] would like clarification on the dose/frequency you would like her on, and have the order faxed to [phone]

## 2019-02-11 NOTE — TELEPHONE ENCOUNTER
Can you make Tylenol order 2 extra-strength Tylenol every 6 hr as needed for pain or fever greater than 100 4    Right and I will sign it so we can be faxed

## 2019-02-12 ENCOUNTER — TELEPHONE (OUTPATIENT)
Dept: FAMILY MEDICINE CLINIC | Facility: CLINIC | Age: 84
End: 2019-02-12

## 2019-02-12 ENCOUNTER — TELEPHONE (OUTPATIENT)
Dept: OTHER | Facility: OTHER | Age: 84
End: 2019-02-12

## 2019-02-12 DIAGNOSIS — Z51.5 HOSPICE CARE: ICD-10-CM

## 2019-02-12 RX ORDER — OXYCODONE HYDROCHLORIDE 5 MG/1
2.5 TABLET ORAL EVERY 6 HOURS PRN
Qty: 12 TABLET | Refills: 0 | Status: SHIPPED | OUTPATIENT
Start: 2019-02-12 | End: 2019-02-13 | Stop reason: SDUPTHER

## 2019-02-12 NOTE — TELEPHONE ENCOUNTER
Patient fell and broke her right clavicle over the weekend  The RN is requesting a change to her Oxycodone, from every 4 hours, to every 6 hours  [facility]

## 2019-02-12 NOTE — TELEPHONE ENCOUNTER
Late documentation: Shyann from [facility] called because a request for refill of Oxycodone was made for patient, and they received a faxed prescription at [facility]  The prescription was supposed to have been sent to [address]: phone [phone], fax [phone]  Dr Andrew Cowan was paged @4298  He stated that since the office was closed due to weather, it would have to be handled tomorrow  I called Shyann back at 1330 and notified her of this  She stated that they had received the refill from [facility] [address] after she called  She denied need for further assistance

## 2019-02-13 DIAGNOSIS — Z51.5 HOSPICE CARE: ICD-10-CM

## 2019-02-13 NOTE — TELEPHONE ENCOUNTER
The paper that I gave you earlier on this patient states that the medication is supposed to go to [facility]

## 2019-02-13 NOTE — TELEPHONE ENCOUNTER
Nursing office called and stated the oxycodone was written incorrectly  Needs to be oxycodone 5mg every 6 hours  They would like a call back to verify if that's what you really want

## 2019-02-13 NOTE — TELEPHONE ENCOUNTER
New RX james'd up, the pharmacy cannot except the faxed RX  Nursing home did ask for the RX to say PRN as Nikolas Polanco sometime says she does not need the medication and is not feeling as much pain

## 2019-02-13 NOTE — TELEPHONE ENCOUNTER
See me re dosing  The original message was that the hospice team wanted the Oxycodone own  to go from every 4 hours to every 6 hours which was done yesterday  Then there was a message to the on-call doctor last night that it was sent to the wrong pharmacy  This was then corrected again today  Can you double check things

## 2019-02-14 RX ORDER — OXYCODONE HYDROCHLORIDE 5 MG/1
5 TABLET ORAL EVERY 6 HOURS PRN
Qty: 24 TABLET | Refills: 0 | Status: SHIPPED | OUTPATIENT
Start: 2019-02-14

## 2019-02-16 ENCOUNTER — TELEPHONE (OUTPATIENT)
Dept: OTHER | Facility: OTHER | Age: 84
End: 2019-02-16

## 2019-02-16 NOTE — TELEPHONE ENCOUNTER
Paged Dr Alba Steve to call back health call and connected him to [facility]

## 2019-03-01 ENCOUNTER — TELEPHONE (OUTPATIENT)
Dept: NEUROLOGY | Facility: CLINIC | Age: 84
End: 2019-03-01

## 2019-03-04 DIAGNOSIS — R63.4 WEIGHT LOSS: ICD-10-CM

## 2019-03-05 DIAGNOSIS — R63.4 WEIGHT LOSS: ICD-10-CM

## 2019-03-05 RX ORDER — MIRTAZAPINE 15 MG/1
TABLET, FILM COATED ORAL
Qty: 7.5 TABLET | Refills: 4 | Status: SHIPPED | OUTPATIENT
Start: 2019-03-05

## 2019-03-05 RX ORDER — MIRTAZAPINE 15 MG/1
7.5 TABLET, FILM COATED ORAL
Qty: 15 TABLET | Refills: 1 | Status: SHIPPED | OUTPATIENT
Start: 2019-03-05

## 2019-04-12 DIAGNOSIS — R68.2 DRY MOUTH: Primary | ICD-10-CM

## 2019-04-12 DIAGNOSIS — E03.9 ACQUIRED HYPOTHYROIDISM: ICD-10-CM

## 2019-04-14 RX ORDER — LEVOTHYROXINE SODIUM 0.07 MG/1
TABLET ORAL
Qty: 15 TABLET | Refills: 4 | Status: SHIPPED | OUTPATIENT
Start: 2019-04-14 | End: 2019-07-09 | Stop reason: SDUPTHER

## 2019-04-14 RX ORDER — FLUORIDE TOOTHPASTE
TOOTHPASTE DENTAL
Qty: 473 ML | Refills: 4 | Status: SHIPPED | OUTPATIENT
Start: 2019-04-14

## 2019-04-26 RX ORDER — IBUPROFEN 200 MG
TABLET ORAL
Qty: 30 TABLET | Refills: 0 | Status: CANCELLED | OUTPATIENT
Start: 2019-04-26

## 2019-04-30 DIAGNOSIS — E55.9 VITAMIN D DEFICIENCY: ICD-10-CM

## 2019-04-30 DIAGNOSIS — R52 PAIN: Primary | ICD-10-CM

## 2019-05-01 RX ORDER — ACETAMINOPHEN 325 MG/1
TABLET ORAL
Qty: 60 TABLET | Refills: 4 | Status: SHIPPED | OUTPATIENT
Start: 2019-05-01 | End: 2019-07-10 | Stop reason: SDUPTHER

## 2019-05-01 RX ORDER — DIPHENHYD/PHENYLEPH/ACETAMINOP 25-650/30
LIQUID (ML) ORAL
Qty: 15 PATCH | Refills: 4 | Status: SHIPPED | OUTPATIENT
Start: 2019-05-01

## 2019-05-03 DIAGNOSIS — F03.90 DEMENTIA WITHOUT BEHAVIORAL DISTURBANCE, UNSPECIFIED DEMENTIA TYPE (HCC): ICD-10-CM

## 2019-05-03 RX ORDER — DIVALPROEX SODIUM 250 MG/1
TABLET, DELAYED RELEASE ORAL
Qty: 15 TABLET | Refills: 4 | Status: SHIPPED | OUTPATIENT
Start: 2019-05-03

## 2019-05-08 ENCOUNTER — TELEPHONE (OUTPATIENT)
Dept: FAMILY MEDICINE CLINIC | Facility: CLINIC | Age: 84
End: 2019-05-08

## 2019-05-08 ENCOUNTER — TRANSCRIBE ORDERS (OUTPATIENT)
Dept: FAMILY MEDICINE CLINIC | Facility: CLINIC | Age: 84
End: 2019-05-08

## 2019-05-08 DIAGNOSIS — R30.9 PAINFUL URINATION: ICD-10-CM

## 2019-05-08 DIAGNOSIS — R39.198 DIFFICULTY URINATING: Primary | ICD-10-CM

## 2019-05-20 DIAGNOSIS — F41.9 ANXIETY: ICD-10-CM

## 2019-05-20 RX ORDER — CITALOPRAM 20 MG/1
TABLET ORAL
Qty: 15 TABLET | Refills: 4 | Status: SHIPPED | OUTPATIENT
Start: 2019-05-20 | End: 2019-08-15 | Stop reason: SDUPTHER

## 2019-05-23 DIAGNOSIS — F03.90 DEMENTIA WITHOUT BEHAVIORAL DISTURBANCE, UNSPECIFIED DEMENTIA TYPE (HCC): Primary | ICD-10-CM

## 2019-05-23 RX ORDER — MIRTAZAPINE 15 MG/1
TABLET, FILM COATED ORAL
Qty: 15 TABLET | Refills: 4 | Status: SHIPPED | OUTPATIENT
Start: 2019-05-23

## 2019-06-06 DIAGNOSIS — K59.00 CONSTIPATION, UNSPECIFIED CONSTIPATION TYPE: ICD-10-CM

## 2019-06-06 RX ORDER — POLYETHYLENE GLYCOL 3350 17 G/17G
POWDER, FOR SOLUTION ORAL
Qty: 238 G | Refills: 4 | Status: SHIPPED | OUTPATIENT
Start: 2019-06-06

## 2019-06-26 DIAGNOSIS — F03.91 DEMENTIA WITH BEHAVIORAL DISTURBANCE, UNSPECIFIED DEMENTIA TYPE (HCC): Primary | ICD-10-CM

## 2019-06-26 RX ORDER — QUETIAPINE FUMARATE 25 MG/1
TABLET, FILM COATED ORAL
Qty: 60 TABLET | Refills: 4 | Status: SHIPPED | OUTPATIENT
Start: 2019-06-26 | End: 2019-12-16 | Stop reason: SDUPTHER

## 2019-07-03 ENCOUNTER — TELEPHONE (OUTPATIENT)
Dept: FAMILY MEDICINE CLINIC | Facility: CLINIC | Age: 84
End: 2019-07-03

## 2019-07-03 DIAGNOSIS — F41.9 ANXIETY: Primary | ICD-10-CM

## 2019-07-03 NOTE — TELEPHONE ENCOUNTER
recevied a fax from [facility] asking for refill  Pmed checked last filled on 5/8/2019, Q:10, day supply:5   Please advise and authorize, thanks

## 2019-07-03 NOTE — TELEPHONE ENCOUNTER
Shyann called from [facility] to inform us of their game plan which  is to move [facility] from there personal care unit to a secure dementia unit  Shyann will be faxing a new DME to the office for Milton Graves to complete I did inform her Milton Graves is currently not in the office this week and will be in Monday 7/8/2019  Shyann understood requested since it is a state mandated form that we keep an eye out for it  If Dr Fuentes can not complete the form on Monday they request we call and make them aware  There number is [phone] building [address]   Shyann stated they can not move pt to a secure dementia unit without the order and they are coordinating  move with patient's family  Once fax is received it will be placed in the black bin

## 2019-07-05 RX ORDER — ALPRAZOLAM 0.25 MG/1
0.25 TABLET ORAL 2 TIMES DAILY PRN
Qty: 10 TABLET | Refills: 5 | Status: SHIPPED | OUTPATIENT
Start: 2019-07-05

## 2019-07-09 DIAGNOSIS — E03.9 ACQUIRED HYPOTHYROIDISM: ICD-10-CM

## 2019-07-09 RX ORDER — LEVOTHYROXINE SODIUM 0.07 MG/1
TABLET ORAL
Qty: 15 TABLET | Refills: 4 | Status: SHIPPED | OUTPATIENT
Start: 2019-07-09 | End: 2019-08-19 | Stop reason: SDUPTHER

## 2019-07-10 DIAGNOSIS — E55.9 VITAMIN D DEFICIENCY: ICD-10-CM

## 2019-07-11 RX ORDER — ACETAMINOPHEN 325 MG/1
TABLET ORAL
Qty: 60 TABLET | Refills: 4 | Status: SHIPPED | OUTPATIENT
Start: 2019-07-11 | End: 2019-09-09 | Stop reason: SDUPTHER

## 2019-08-05 DIAGNOSIS — F03.91 DEMENTIA WITH BEHAVIORAL DISTURBANCE, UNSPECIFIED DEMENTIA TYPE (HCC): Primary | ICD-10-CM

## 2019-08-05 RX ORDER — MIRTAZAPINE 15 MG/1
TABLET, FILM COATED ORAL
Qty: 15 TABLET | Refills: 4 | Status: SHIPPED | OUTPATIENT
Start: 2019-08-05

## 2019-08-15 DIAGNOSIS — F41.9 ANXIETY: ICD-10-CM

## 2019-08-15 RX ORDER — CITALOPRAM 20 MG/1
TABLET ORAL
Qty: 15 TABLET | Refills: 4 | Status: SHIPPED | OUTPATIENT
Start: 2019-08-15 | End: 2019-08-19 | Stop reason: SDUPTHER

## 2019-08-19 DIAGNOSIS — E03.9 ACQUIRED HYPOTHYROIDISM: ICD-10-CM

## 2019-08-19 DIAGNOSIS — F41.9 ANXIETY: ICD-10-CM

## 2019-08-20 RX ORDER — LEVOTHYROXINE SODIUM 0.07 MG/1
75 TABLET ORAL
Qty: 30 TABLET | Refills: 4 | Status: SHIPPED | OUTPATIENT
Start: 2019-08-20

## 2019-08-20 RX ORDER — CITALOPRAM 20 MG/1
20 TABLET ORAL DAILY
Qty: 30 TABLET | Refills: 4 | Status: SHIPPED | OUTPATIENT
Start: 2019-08-20

## 2019-09-09 DIAGNOSIS — E55.9 VITAMIN D DEFICIENCY: ICD-10-CM

## 2019-09-11 RX ORDER — ACETAMINOPHEN 325 MG/1
TABLET ORAL
Qty: 60 TABLET | Refills: 4 | Status: SHIPPED | OUTPATIENT
Start: 2019-09-11

## 2019-10-17 DIAGNOSIS — F03.91 DEMENTIA WITH BEHAVIORAL DISTURBANCE, UNSPECIFIED DEMENTIA TYPE (HCC): Primary | ICD-10-CM

## 2019-10-18 RX ORDER — MIRTAZAPINE 15 MG/1
TABLET, FILM COATED ORAL
Qty: 30 TABLET | Refills: 4 | Status: SHIPPED | OUTPATIENT
Start: 2019-10-18

## 2019-10-31 DIAGNOSIS — E55.9 VITAMIN D DEFICIENCY: ICD-10-CM

## 2019-11-01 RX ORDER — ACETAMINOPHEN 325 MG/1
TABLET ORAL
Qty: 120 TABLET | Refills: 4 | Status: SHIPPED | OUTPATIENT
Start: 2019-11-01

## 2019-12-16 DIAGNOSIS — F03.91 DEMENTIA WITH BEHAVIORAL DISTURBANCE, UNSPECIFIED DEMENTIA TYPE (HCC): ICD-10-CM

## 2019-12-17 RX ORDER — QUETIAPINE FUMARATE 25 MG/1
TABLET, FILM COATED ORAL
Qty: 60 TABLET | Refills: 4 | Status: SHIPPED | OUTPATIENT
Start: 2019-12-17

## 2020-08-13 NOTE — TELEPHONE ENCOUNTER
They did not answer, left detailed message to nursing at [facility]   Correct sig is 2 5mg BID PRN Suturegard Body: The suture ends were repeatedly re-tightened and re-clamped to achieve the desired tissue expansion.

## 2022-10-07 NOTE — PROGRESS NOTES
Called pt and phone kept ringing, then called pt's daughter and LM
LM for pt to return call 
Unable to get in contact with pt multiple times   Letter sent to patient's home
No

## 2023-01-06 NOTE — TELEPHONE ENCOUNTER
Please double check if they did a culture as I had ordered also please double check if they sent the urine for culture as I had ordered  Recommend we start the patient on Keflex 500 mg 3 times daily for 7 days  This could be a possible bladder infection which could be causing her confusion    May have to write this on paper and fax it over as an order 

## 2024-01-22 NOTE — PLAN OF CARE

## (undated) DEVICE — 6617 IOBAN II PATIENT ISOLATION DRAPE 5/BX,4BX/CS: Brand: STERI-DRAPE™ IOBAN™ 2

## (undated) DEVICE — STRL ALLENTOWN HIP SHOULDER PK: Brand: CARDINAL HEALTH

## (undated) DEVICE — TRAY FOLEY 16FR URIMETER SURESTEP

## (undated) DEVICE — COBAN 6 IN STERILE

## (undated) DEVICE — DRESSING ALLEVYN LIFE SACRAL 6.75 X 6.5 IN

## (undated) DEVICE — WEBRIL 6 IN UNSTERILE

## (undated) DEVICE — BULB SYRINGE,IRRIGATION WITH PROTECTIVE CAP: Brand: DOVER

## (undated) DEVICE — GLOVE RADIATION SZ 7

## (undated) DEVICE — INTENDED FOR TISSUE SEPARATION, AND OTHER PROCEDURES THAT REQUIRE A SHARP SURGICAL BLADE TO PUNCTURE OR CUT.: Brand: BARD-PARKER SAFETY BLADES SIZE 10, STERILE

## (undated) DEVICE — DRESSING MEPILEX AG BORDER 4 X 4 IN

## (undated) DEVICE — SUT VICRYL 2-0 CT-1 36 IN J945H

## (undated) DEVICE — 3M™ STERI-DRAPE™ U-DRAPE 1015: Brand: STERI-DRAPE™

## (undated) DEVICE — CAST PADDING 4 IN SYNTHETIC NON-STRL

## (undated) DEVICE — PROXIMATE SKIN STAPLERS (35 WIDE) CONTAINS 35 STAINLESS STEEL STAPLES (FIXED HEAD): Brand: PROXIMATE

## (undated) DEVICE — DRAPE C-ARMOUR

## (undated) DEVICE — U-DRAPE: Brand: CONVERTORS

## (undated) DEVICE — 3M™ STERI-STRIP™ REINFORCED ADHESIVE SKIN CLOSURES, R1547, 1/2 IN X 4 IN (12 MM X 100 MM), 6 STRIPS/ENVELOPE: Brand: 3M™ STERI-STRIP™

## (undated) DEVICE — 3.2MM GUIDE WIRE 400MM

## (undated) DEVICE — DRAPE C-ARM X-RAY

## (undated) DEVICE — 3000CC GUARDIAN II: Brand: GUARDIAN

## (undated) DEVICE — 4.0MM THREE-FLUTED DRILL BIT QC/260MM/65MM CALIBRATION

## (undated) DEVICE — GLOVE SRG BIOGEL 6.5

## (undated) DEVICE — SUT VICRYL 1 CTX 36 IN J977H

## (undated) DEVICE — GLOVE INDICATOR PI UNDERGLOVE SZ 7 BLUE

## (undated) DEVICE — CHLORAPREP HI-LITE 26ML ORANGE